# Patient Record
Sex: FEMALE | Race: WHITE | NOT HISPANIC OR LATINO | Employment: OTHER | URBAN - METROPOLITAN AREA
[De-identification: names, ages, dates, MRNs, and addresses within clinical notes are randomized per-mention and may not be internally consistent; named-entity substitution may affect disease eponyms.]

---

## 2017-04-24 ENCOUNTER — TELEPHONE (OUTPATIENT)
Dept: SLEEP MEDICINE | Facility: CLINIC | Age: 67
End: 2017-04-24

## 2017-04-24 NOTE — TELEPHONE ENCOUNTER
----- Message from Linhmansa Thompson sent at 4/24/2017  8:37 AM CDT -----  Contact: [employer]  x_  1st Request  _  2nd Request  _  3rd Request        Who: Vannessa Almeida    Why: Checking the status of the C-pap machine. Please call her     What Number to Call Back: [phone] [phone]    When to Expect a call back: (Before the end of the day)   -- if the call is after 12:00, the call back will be tomorrow.

## 2017-05-03 ENCOUNTER — OFFICE VISIT (OUTPATIENT)
Dept: FAMILY MEDICINE | Facility: CLINIC | Age: 67
End: 2017-05-03
Payer: MEDICARE

## 2017-05-03 VITALS
HEART RATE: 60 BPM | TEMPERATURE: 99 F | WEIGHT: 192.88 LBS | BODY MASS INDEX: 31 KG/M2 | OXYGEN SATURATION: 96 % | HEIGHT: 66 IN | DIASTOLIC BLOOD PRESSURE: 62 MMHG | SYSTOLIC BLOOD PRESSURE: 92 MMHG

## 2017-05-03 DIAGNOSIS — Z23 PNEUMOCOCCAL VACCINATION ADMINISTERED AT CURRENT VISIT: ICD-10-CM

## 2017-05-03 DIAGNOSIS — G47.33 OSA (OBSTRUCTIVE SLEEP APNEA): Primary | ICD-10-CM

## 2017-05-03 DIAGNOSIS — Z23 NEED FOR TDAP VACCINATION: ICD-10-CM

## 2017-05-03 PROCEDURE — 99213 OFFICE O/P EST LOW 20 MIN: CPT | Mod: PBBFAC,25,PO | Performed by: FAMILY MEDICINE

## 2017-05-03 PROCEDURE — 99214 OFFICE O/P EST MOD 30 MIN: CPT | Mod: S$PBB,,, | Performed by: FAMILY MEDICINE

## 2017-05-03 PROCEDURE — 90670 PCV13 VACCINE IM: CPT | Mod: PBBFAC,PO | Performed by: FAMILY MEDICINE

## 2017-05-03 PROCEDURE — 90715 TDAP VACCINE 7 YRS/> IM: CPT | Mod: PBBFAC,PO | Performed by: FAMILY MEDICINE

## 2017-05-03 PROCEDURE — 99999 PR PBB SHADOW E&M-EST. PATIENT-LVL III: CPT | Mod: PBBFAC,,, | Performed by: FAMILY MEDICINE

## 2017-05-03 PROCEDURE — 90471 IMMUNIZATION ADMIN: CPT | Mod: PBBFAC,PO | Performed by: FAMILY MEDICINE

## 2017-05-03 NOTE — MR AVS SNAPSHOT
[facility]  [address]  [place] [address]  Phone: [phone]  Fax: [phone]                  Lenore Subramanian   5/3/2017 2:20 PM   Office Visit    Description:  Female : 1950   Provider:  Newton Allen MD   Department:  [facility]           Reason for Visit     Establish Care           Diagnoses this Visit        Comments    HERVE (obstructive sleep apnea)    -  Primary     Pneumococcal vaccination administered at current visit         Need for Tdap vaccination                To Do List           Goals (5 Years of Data)     None      [facility]     [facility] -  Assistance  Unless otherwise directed by your provider, please contact [facility], our nurse care line that is available for  assistance.     Registered nurses in the [facility] Center provide: appointment scheduling, clinical advisement, health education, and other advisory services.  Call: [phone] (toll free)               Medications           Message regarding Medications     Verify the changes and/or additions to your medication regime listed below are the same as discussed with your clinician today.  If any of these changes or additions are incorrect, please notify your healthcare provider.             Verify that the below list of medications is an accurate representation of the medications you are currently taking.  If none reported, the list may be blank. If incorrect, please contact your healthcare provider. Carry this list with you in case of emergency.           Current Medications     aspirin (ECOTRIN) 81 MG EC tablet Take 81 mg by mouth once daily.    cetirizine (ZYRTEC) 10 MG tablet Take 1 tablet (10 mg total) by mouth once daily.    escitalopram oxalate (LEXAPRO) 20 MG tablet Take 1 tablet (20 mg total) by mouth once daily.    fluticasone (FLONASE) 50 mcg/actuation nasal spray USE 1 TO 2 SPRAYS IN EACH NOSTRIL ONCE DAILY     "sodium,potassium,mag sulfates (SUPREP BOWEL PREP KIT) 17.5-3.13-1.6 gram SolR Take 1 kit by mouth as directed.    valacyclovir (VALTREX) 1000 MG tablet 1 qd    estradiol (ESTRACE) 0.01 % (0.1 mg/gram) vaginal cream Place 0.5 g vaginally twice a week. Insert 0.5grams intravaginally twice weekly           Clinical Reference Information           Your Vitals Were     BP Pulse Temp Height Weight Last Period    92/62 (BP Location: Right arm, Patient Position: Sitting, BP Method: Manual) 60 98.5 °F (36.9 °C) (Oral) 5' 6" (1.676 m) 87.5 kg (192 lb 14.4 oz) 04/09/2010    SpO2 BMI             96% 31.14 kg/m2         Blood Pressure          Most Recent Value    BP  92/62      Allergies as of 5/3/2017     No Known Allergies      Immunizations Administered on Date of Encounter - 5/3/2017     Name Date Dose VIS Date Route    Pneumococcal Conjugate - 13 Valent 5/3/2017 0.5 mL 11/5/2015 Intramuscular    TDAP 5/3/2017 0.5 mL 2/24/2015 Intramuscular      Orders Placed During Today's Visit      Normal Orders This Visit    Pneumococcal Conjugate Vaccine (13 Valent) (IM)     Tdap Vaccine       Language Assistance Services     ATTENTION: Language assistance services are available, free of charge. Please call [phone].      ATENCIÓN: Si habla kate, tiene a shen disposición servicios gratuitos de asistencia lingüística. Llame al [phone].     CHÚ Ý: N?u b?n nói Ti?ng Vi?t, có các d?ch v? h? tr? ngôn ng? mi?n phí dành cho b?n. G?i s? [phone].         [facility] complies with applicable Federal civil rights laws and does not discriminate on the basis of race, color, national origin, age, disability, or sex.        "

## 2017-05-03 NOTE — PROGRESS NOTES
[facility]  Progress Note    SUBJECTIVE:     Chief Complaint   Patient presents with    Establish Care       HPI   Lenoer Subramanian  is a 67 y.o. female here to establish care and for follow-up of her HERVE. Her insurance is requiring her to have an established visit so she can continue to receive CPAP supplies etc. She uses it every night and has no problems with it. Currently on CPAP 8.     Review of patient's allergies indicates:  No Known Allergies    Past Medical History:   Diagnosis Date    Anxiety     Asthma     as a child    Mental disorder     Depression     Past Surgical History:   Procedure Laterality Date    COLONOSCOPY N/A 10/25/2016    Procedure: COLONOSCOPY;  Surgeon: JANINA Dominguez MD;  Location: [facility] ([address] [place] [address]);  Service: Endoscopy;  Laterality: N/A;    TONSILLECTOMY  1958    TUBAL LIGATION  1979    PP BTL     Family History   Problem Relation Age of Onset    Hypertension Mother     Diabetes Paternal Grandmother     Multiple myeloma Father     Colon cancer Sister 61    Breast cancer Maternal Aunt 72    Breast cancer Paternal Aunt 58    Hypertrophic cardiomyopathy Son     Heart disease Neg Hx     Stroke Neg Hx     Ovarian cancer Neg Hx     Melanoma Neg Hx      Social History   Substance Use Topics    Smoking status: Never Smoker    Smokeless tobacco: Never Used    Alcohol use 1.2 oz/week     2 Glasses of wine per week      Comment: occasionally        Review of Systems   Constitutional: Negative for chills, fever and malaise/fatigue.   HENT: Negative.    Respiratory: Negative.  Negative for cough and shortness of breath.    Cardiovascular: Negative.  Negative for chest pain.   Gastrointestinal: Negative.  Negative for abdominal pain, nausea and vomiting.   Genitourinary: Negative.    Neurological: Negative for weakness and headaches.   All other systems reviewed and are negative.    OBJECTIVE:     Vitals:    05/03/17 1403   BP: 92/62   Pulse: 60   Temp: 98.5 °F (36.9  °C)     Body mass index is 31.14 kg/(m^2).    Physical Exam   Constitutional: She is oriented to person, place, and time and well-developed, well-nourished, and in no distress. No distress.   HENT:   Head: Normocephalic and atraumatic.   Eyes: Conjunctivae and EOM are normal.   Cardiovascular: Normal rate, regular rhythm and normal heart sounds.  Exam reveals no gallop and no friction rub.    No murmur heard.  Pulmonary/Chest: Effort normal and breath sounds normal. No respiratory distress. She has no wheezes. She has no rales.   Abdominal: Soft. Bowel sounds are normal. She exhibits no distension. There is no tenderness.   Neurological: She is alert and oriented to person, place, and time.   Skin: Skin is warm. She is not diaphoretic.       Old records were reviewed. Labs and/or images were independently reviewed.    ASSESSMENT     1. HERVE (obstructive sleep apnea)    2. Pneumococcal vaccination administered at current visit    3. Need for Tdap vaccination        PLAN:     HERVE (obstructive sleep apnea)   - Currently on CPAP setting of 8 mmH2o. Filled out paper work. Gave copy to patient. Awaiting for patient to bring her sleep study results so we can fax with forms.     Pneumococcal vaccination administered at current visit  -     Pneumococcal Conjugate Vaccine (13 Valent) (IM)    Need for Tdap vaccination  -     Tdap Vaccine      RTC PRN  More than 50% of the encounter was spent counseling patient about HERVE, in an outpatient setting. Total time spent counseling patient: 25.    Newton Allen MD  05/03/2017 2:28 PM

## 2017-05-30 ENCOUNTER — TELEPHONE (OUTPATIENT)
Dept: FAMILY MEDICINE | Facility: CLINIC | Age: 67
End: 2017-05-30

## 2017-05-30 DIAGNOSIS — G47.33 OSA (OBSTRUCTIVE SLEEP APNEA): Primary | ICD-10-CM

## 2017-05-30 NOTE — TELEPHONE ENCOUNTER
----- Message from Charlene Fletcher sent at 5/30/2017 11:10 AM CDT -----  Contact: self  Pt needs a referral for a sleep study. Pt states medicare needs more info in sleep studay. Please call [phone]. Thanks

## 2017-06-28 ENCOUNTER — TELEPHONE (OUTPATIENT)
Dept: FAMILY MEDICINE | Facility: CLINIC | Age: 67
End: 2017-06-28

## 2017-06-28 DIAGNOSIS — Z12.31 ENCOUNTER FOR SCREENING MAMMOGRAM FOR MALIGNANT NEOPLASM OF BREAST: Primary | ICD-10-CM

## 2017-06-28 NOTE — TELEPHONE ENCOUNTER
----- Message from Cait Harrell sent at 6/28/2017  1:39 PM CDT -----  Contact: Self/[phone]  Patient is requesting a Mammogram Order. Thank you.

## 2017-07-07 RX ORDER — ESCITALOPRAM OXALATE 20 MG/1
20 TABLET ORAL DAILY
Qty: 90 TABLET | Refills: 3 | Status: SHIPPED | OUTPATIENT
Start: 2017-07-07 | End: 2018-06-13 | Stop reason: SDUPTHER

## 2017-08-18 ENCOUNTER — OFFICE VISIT (OUTPATIENT)
Dept: SLEEP MEDICINE | Facility: CLINIC | Age: 67
End: 2017-08-18
Payer: MEDICARE

## 2017-08-18 ENCOUNTER — OFFICE VISIT (OUTPATIENT)
Dept: FAMILY MEDICINE | Facility: CLINIC | Age: 67
End: 2017-08-18
Payer: MEDICARE

## 2017-08-18 ENCOUNTER — HOSPITAL ENCOUNTER (OUTPATIENT)
Dept: RADIOLOGY | Facility: HOSPITAL | Age: 67
Discharge: HOME | End: 2017-08-18
Attending: FAMILY MEDICINE
Payer: MEDICARE

## 2017-08-18 VITALS
SYSTOLIC BLOOD PRESSURE: 106 MMHG | WEIGHT: 191 LBS | BODY MASS INDEX: 30.86 KG/M2 | BODY MASS INDEX: 30.7 KG/M2 | OXYGEN SATURATION: 96 % | HEIGHT: 66 IN | WEIGHT: 192 LBS | HEIGHT: 66 IN | TEMPERATURE: 98 F | DIASTOLIC BLOOD PRESSURE: 70 MMHG | HEART RATE: 59 BPM

## 2017-08-18 VITALS
OXYGEN SATURATION: 97 % | WEIGHT: 188.94 LBS | HEART RATE: 57 BPM | DIASTOLIC BLOOD PRESSURE: 67 MMHG | BODY MASS INDEX: 30.36 KG/M2 | HEIGHT: 66 IN | SYSTOLIC BLOOD PRESSURE: 112 MMHG

## 2017-08-18 DIAGNOSIS — R79.9 ABNORMAL FINDING OF BLOOD CHEMISTRY: ICD-10-CM

## 2017-08-18 DIAGNOSIS — E66.9 OBESITY, UNSPECIFIED OBESITY SEVERITY, UNSPECIFIED OBESITY TYPE: ICD-10-CM

## 2017-08-18 DIAGNOSIS — Z78.0 POST-MENOPAUSAL: ICD-10-CM

## 2017-08-18 DIAGNOSIS — Z00.00 ROUTINE PHYSICAL EXAMINATION: Primary | ICD-10-CM

## 2017-08-18 DIAGNOSIS — F41.9 ANXIETY: ICD-10-CM

## 2017-08-18 DIAGNOSIS — G47.33 OSA (OBSTRUCTIVE SLEEP APNEA): Primary | ICD-10-CM

## 2017-08-18 DIAGNOSIS — Z12.31 ENCOUNTER FOR SCREENING MAMMOGRAM FOR MALIGNANT NEOPLASM OF BREAST: ICD-10-CM

## 2017-08-18 PROCEDURE — 99999 PR PBB SHADOW E&M-EST. PATIENT-LVL III: CPT | Mod: PBBFAC,,, | Performed by: FAMILY MEDICINE

## 2017-08-18 PROCEDURE — 77067 SCR MAMMO BI INCL CAD: CPT | Mod: TC

## 2017-08-18 PROCEDURE — 99213 OFFICE O/P EST LOW 20 MIN: CPT | Mod: PBBFAC,27,PO | Performed by: INTERNAL MEDICINE

## 2017-08-18 PROCEDURE — 99214 OFFICE O/P EST MOD 30 MIN: CPT | Mod: S$PBB,,, | Performed by: FAMILY MEDICINE

## 2017-08-18 PROCEDURE — 99214 OFFICE O/P EST MOD 30 MIN: CPT | Mod: S$PBB,,, | Performed by: INTERNAL MEDICINE

## 2017-08-18 PROCEDURE — 77067 SCR MAMMO BI INCL CAD: CPT | Mod: 26,,, | Performed by: RADIOLOGY

## 2017-08-18 PROCEDURE — 99999 PR PBB SHADOW E&M-EST. PATIENT-LVL III: CPT | Mod: PBBFAC,,, | Performed by: INTERNAL MEDICINE

## 2017-08-18 PROCEDURE — 77063 BREAST TOMOSYNTHESIS BI: CPT | Mod: 26,,, | Performed by: RADIOLOGY

## 2017-08-18 RX ORDER — CETIRIZINE HYDROCHLORIDE 10 MG/1
10 TABLET ORAL DAILY
Qty: 90 TABLET | Refills: 3 | Status: SHIPPED | OUTPATIENT
Start: 2017-08-18 | End: 2018-10-04 | Stop reason: SDUPTHER

## 2017-08-18 NOTE — LETTER
August 18, 2017      Newton Allen MD  [address] [facility]  [place] [address]           [facility]  [address] [facility] [place] [address]  Phone: [phone]  Fax: [phone]          Patient: Lenore Subramanian   MR Number: [id number]   YOB: 1950   Date of Visit: 8/18/2017       Dear Dr. Newton Allen:    Thank you for referring Lenore Subramanian to me for evaluation. Attached you will find relevant portions of my assessment and plan of care.    If you have questions, please do not hesitate to call me. I look forward to following Lenore Subramanian along with you.    Sincerely,    David Kirby MD    Enclosure  CC:  No Recipients    If you would like to receive this communication electronically, please contact [email or web address] or [phone] to request more information on [employer] Link access.    For providers and/or their staff who would like to refer a patient to [facility], please contact us through our one-stop-shop provider referral line, [facility], at [phone].    If you feel you have received this communication in error or would no longer like to receive these types of communications, please e-mail [email or web address]

## 2017-08-18 NOTE — PROGRESS NOTES
Lenore Subramanian  was seen as a new patient at the request of  Newton Allen MD for the evaluation of  herve.    CHIEF COMPLAINT:    Chief Complaint   Patient presents with    Sleep Apnea       HISTORY OF PRESENT ILLNESS: Lenore Subramanian is a 67 y.o. female is here for sleep evaluation.   Patient was diagnosed with herve in 2008 at [facility].  Patient was seen by DAVIE Bolden and last appointment was 4/24/17.  Patient was provided new cpap unit 4/17.  Patient is doing well with cpap of 8 cm H20 with nasal pillow. No issue with cpap.  With cpap, patient denied apnea, snoring.  Feeling rested upon awake.      [place] Sleepiness Scale score during initial sleep evaluation was 11.    SLEEP ROUTINE:  Activity the hour prior to sleep:   Hygiene and read    Bed partner:    Time to bed:  9-10 pm   Lights off:  yes  Sleep onset latency:  5-10 minutes        Disruptions or awakenings:    0-1 times    Wakeup time:      7 am   Perceived sleep quality:  rested       Daytime naps:      45 minutes in afternoon  Weekend sleep routine:      same  Caffeine use: 2 cups of coffee in am   exercise habit:   none      PAST MEDICAL HISTORY:    Active Ambulatory Problems     Diagnosis Date Noted    Depression with anxiety     Chronic rhinitis 07/31/2015    Oral herpes simplex, not currently active 07/31/2015    Microhematuria 07/31/2015    Primary osteoarthritis involving multiple joints 07/31/2015    Obese 07/31/2015    Family history of diabetes mellitus 07/31/2015    HERVE (obstructive sleep apnea) 05/03/2017     Resolved Ambulatory Problems     Diagnosis Date Noted    Screening 10/25/2016     Past Medical History:   Diagnosis Date    Anxiety     Asthma     Mental disorder     HERVE (obstructive sleep apnea)                 PAST SURGICAL HISTORY:    Past Surgical History:   Procedure Laterality Date    COLONOSCOPY N/A 10/25/2016    Procedure: COLONOSCOPY;  Surgeon: JANINA Dominguez MD;  Location: [facility] ([address] [place] [address]);  Service:  Endoscopy;  Laterality: N/A;    TONSILLECTOMY  1958    TUBAL LIGATION  1979    PP BTL         FAMILY HISTORY:                Family History   Problem Relation Age of Onset    Hypertension Mother     Diabetes Paternal Grandmother     Multiple myeloma Father     Colon cancer Sister 61    Breast cancer Maternal Aunt 72    Breast cancer Paternal Aunt 58    Hypertrophic cardiomyopathy Son     Heart disease Neg Hx     Stroke Neg Hx     Ovarian cancer Neg Hx     Melanoma Neg Hx        SOCIAL HISTORY:          Tobacco:   History   Smoking Status    Never Smoker   Smokeless Tobacco    Never Used       alcohol use:    History   Alcohol Use    4.2 oz/week    7 Glasses of wine per week     Comment: occasionally                 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    ALLERGIES:    Review of patient's allergies indicates:   Allergen Reactions    Poison ivy [vit e-nonoxynol 9-aloe vera] Rash       CURRENT MEDICATIONS:    Current Outpatient Prescriptions   Medication Sig Dispense Refill    aspirin (ECOTRIN) 81 MG EC tablet Take 81 mg by mouth once daily.      cetirizine (ZYRTEC) 10 MG tablet Take 1 tablet (10 mg total) by mouth once daily. 90 tablet 3    escitalopram oxalate (LEXAPRO) 20 MG tablet Take 1 tablet (20 mg total) by mouth once daily. 90 tablet 3    fluticasone (FLONASE) 50 mcg/actuation nasal spray USE 1 TO 2 SPRAYS IN EACH NOSTRIL ONCE DAILY 48 g 5    sodium,potassium,mag sulfates (SUPREP BOWEL PREP KIT) 17.5-3.13-1.6 gram SolR Take 1 kit by mouth as directed. 354 mL 0    valacyclovir (VALTREX) 1000 MG tablet 1 qd 30 tablet 1    estradiol (ESTRACE) 0.01 % (0.1 mg/gram) vaginal cream Place 0.5 g vaginally twice a week. Insert 0.5grams intravaginally twice weekly 42 g 0     No current facility-administered medications for this visit.                   REVIEW OF SYSTEMS:     Sleep related symptoms as per HPI.  CONST:Denies weight gain    HEENT: Denies sinus  "congestion  PULM: Denies dyspnea  CARD:  Denies palpitations   GI:  Denies acid reflux  : Denies polyuria  NEURO: Denies headaches  PSYCH: Denies mood disturbance  HEME: Denies anemia   Otherwise, a balance of systems reviewed is negative.          PHYSICAL EXAM:  Vitals:    08/18/17 1134   BP: 112/67   Pulse: (!) 57   SpO2: 97%   Weight: 85.7 kg (188 lb 15 oz)   Height: 5' 6" (1.676 m)   PainSc: 0-No pain     Body mass index is 30.49 kg/m².     GENERAL: Normal development, well groomed  HEENT:  Conjunctivae are non-erythematous; Pupils equal, round, and reactive to light; Nose is symmetrical; Nasal mucosa is pink and moist; Septum is midline; Inferior turbinates are normal; Nasal airflow is normal; Posterior pharynx is pink; Modified Mallampati: 3; Posterior palate is normal; Tonsils +1; Uvula is normal and pink;Tongue is normal; Dentition is fair; No TMJ tenderness; Jaw opening and protrusion without click and without discomfort.  NECK: Supple. Neck circumference is 15 inches. No [id number]. No palpable nodes.     SKIN: On face and neck: No abrasions, no rashes, no lesions.  No subcutaneous nodules are palpable.  RESPIRATORY: Chest is clear to auscultation.  Normal chest expansion and non-labored breathing at rest.  CARDIOVASCULAR: Normal S1, S2.  No murmurs, gallops or rubs. No carotid bruits bilaterally.  EXTREMITIES: No edema. No clubbing. No cyanosis. Station normal. Gait normal.        NEURO/PSYCH: Oriented to time, place and person. Normal attention span and concentration. Affect is full. Mood is normal.                                              DATA   PSG AHI 14.8/low sat 86%  Titration study 5/27/08---effective cpap 8cm    Lab Results   Component Value Date    TSH 2.209 09/13/2013     ASSESSMENT    ICD-10-CM ICD-9-CM    1. HERVE (obstructive sleep apnea) G47.33 327.23        PLAN:    herve - interrogation of cpap confirmed good compliance 23/30 greater than 4 hours.  Average 8 hours per night.  Doing " well and benefiting from cpap.  Will fax report to [facility].      Education: During our discussion today, we talked about the etiology of obstructive sleep apnea as well as the potential ramifications of untreated sleep apnea, which could include daytime sleepiness, hypertension, heart disease and/or stroke.     Precautions: The patient was advised to abstain from driving should they feel sleepy or drowsy.       Thank you for allowing me the opportunity to participate in the care of your patient.    Patient will Return in about 1 year (around 8/18/2018). with md/np.    Please cc note to  Newton Allen MD.

## 2017-08-18 NOTE — PROGRESS NOTES
[facility]  Progress Note    SUBJECTIVE:     Chief Complaint   Patient presents with    Annual Exam       HPI   Lenore Subramanian  is a 67 y.o. female here for routine physical exam. Doing well and has no new complaints/problems at this time.    Review of patient's allergies indicates:   Allergen Reactions    Poison ivy [vit e-nonoxynol 9-aloe vera] Rash       Past Medical History:   Diagnosis Date    Anxiety     Asthma     as a child    Mental disorder     Depression     Past Surgical History:   Procedure Laterality Date    COLONOSCOPY N/A 10/25/2016    Procedure: COLONOSCOPY;  Surgeon: JANINA Dominguez MD;  Location: [address]);  Service: Endoscopy;  Laterality: N/A;    TONSILLECTOMY  1958    TUBAL LIGATION  1979    PP BTL     Family History   Problem Relation Age of Onset    Hypertension Mother     Diabetes Paternal Grandmother     Multiple myeloma Father     Colon cancer Sister 61    Breast cancer Maternal Aunt 72    Breast cancer Paternal Aunt 58    Hypertrophic cardiomyopathy Son     Heart disease Neg Hx     Stroke Neg Hx     Ovarian cancer Neg Hx     Melanoma Neg Hx      Social History   Substance Use Topics    Smoking status: Never Smoker    Smokeless tobacco: Never Used    Alcohol use 1.2 oz/week     2 Glasses of wine per week      Comment: occasionally        Review of Systems   Constitutional: Negative for chills, diaphoresis and fever.   HENT: Negative for congestion, ear pain and sore throat.    Eyes: Negative for photophobia and discharge.   Respiratory: Negative for cough, shortness of breath and wheezing.    Cardiovascular: Negative for chest pain and palpitations.   Gastrointestinal: Negative for abdominal pain, constipation, diarrhea, nausea and vomiting.   Genitourinary: Negative for dysuria and hematuria.   Musculoskeletal: Negative for back pain and myalgias.   Skin: Negative for itching and rash.   Neurological: Negative for dizziness, sensory change,  focal weakness, weakness and headaches.   All other systems reviewed and are negative.    OBJECTIVE:     Vitals:    08/18/17 0918   BP: 106/70   Pulse: (!) 59   Temp: 98.2 °F (36.8 °C)     Body mass index is 30.83 kg/m².    Physical Exam   Constitutional: She is oriented to person, place, and time and well-developed, well-nourished, and in no distress. No distress.   HENT:   Head: Normocephalic and atraumatic.   Right Ear: Tympanic membrane is not perforated, not erythematous and not bulging. No hemotympanum.   Left Ear: Tympanic membrane is not perforated, not erythematous and not bulging. No hemotympanum.   Mouth/Throat: Oropharynx is clear and moist. No oropharyngeal exudate.   Eyes: Conjunctivae and EOM are normal. Pupils are equal, round, and reactive to light.   Neck: No thyromegaly present.   Cardiovascular: Normal rate, regular rhythm and normal heart sounds.  Exam reveals no gallop and no friction rub.    No murmur heard.  Pulmonary/Chest: Effort normal and breath sounds normal. No respiratory distress. She has no wheezes. She has no rales.   Abdominal: Soft. Bowel sounds are normal. She exhibits no distension. There is no tenderness. There is no rebound and no guarding.   Musculoskeletal: Normal range of motion. She exhibits no edema or tenderness.   Lymphadenopathy:     She has no cervical adenopathy.   Neurological: She is alert and oriented to person, place, and time.   Skin: Skin is warm. No rash noted. She is not diaphoretic. No erythema.       Old records were reviewed. Labs and/or images were independently reviewed.    ASSESSMENT     1. Routine physical examination    2. Obesity, unspecified obesity severity, unspecified obesity type    3. Abnormal finding of blood chemistry     4. Anxiety     5. Post-menopausal        PLAN:     Routine physical examination  -     CBC auto differential; Future  -     Comprehensive metabolic panel; Future  -     Hemoglobin A1c; Future  -     Lipid panel; Future  -      TSH; Future  -     T4, free; Future  -     We briefly discussed diet, exercise, and routine preventive exams. All questions and comments addressed.    Obesity, unspecified obesity severity, unspecified obesity type  -     CBC auto differential; Future  -     Comprehensive metabolic panel; Future  -     Hemoglobin A1c; Future  -     Lipid panel; Future  -     TSH; Future  -     T4, free; Future  -     Counseled patient about healthy diet, exercise habits, and to increase physical activity.      Post-menopausal  -     DXA Bone Density Appendicular Skeleton; Future; Expected date: 08/18/2017    Other orders  -     cetirizine (ZYRTEC) 10 MG tablet; Take 1 tablet (10 mg total) by mouth once daily.  Dispense: 90 tablet; Refill: 3      RTC RADHA Allen MD  08/18/2017 9:52 AM

## 2017-08-21 ENCOUNTER — HOSPITAL ENCOUNTER (OUTPATIENT)
Dept: RADIOLOGY | Facility: CLINIC | Age: 67
Discharge: HOME | End: 2017-08-21
Attending: FAMILY MEDICINE
Payer: MEDICARE

## 2017-08-21 DIAGNOSIS — Z78.0 POST-MENOPAUSAL: ICD-10-CM

## 2017-08-21 PROCEDURE — 77080 DXA BONE DENSITY AXIAL: CPT | Mod: 26,,, | Performed by: INTERNAL MEDICINE

## 2017-08-21 PROCEDURE — 77080 DXA BONE DENSITY AXIAL: CPT | Mod: TC,PO

## 2017-09-20 ENCOUNTER — TELEPHONE (OUTPATIENT)
Dept: PULMONOLOGY | Facility: HOSPITAL | Age: 67
End: 2017-09-20

## 2017-09-20 DIAGNOSIS — G47.33 OSA (OBSTRUCTIVE SLEEP APNEA): Primary | ICD-10-CM

## 2017-09-20 NOTE — TELEPHONE ENCOUNTER
----- Message from Aye Valdez sent at 9/18/2017 10:01 AM CDT -----  Contact: pt  Please advise. I check the chart there are no orders in, pt was seen on 8/2017   ----- Message -----  From: Teri Ramirez  Sent: 9/18/2017   8:29 AM  To: Mirta Hernandez V Staff    X_  1st Request  _  2nd Request  _  3rd Request    Who:ROWENA MADRID [[id number]]    Why: Patient states she would like to speak with the staff in regards to getting her order for supplies sent to [email or web address]... Please contact to further discuss and advise     What Number to Call Back: [phone]    When to Expect a call back: (Before the end of the day)   -- if call after 3:00 call back will be tomorrow.

## 2017-10-19 ENCOUNTER — PATIENT MESSAGE (OUTPATIENT)
Dept: FAMILY MEDICINE | Facility: CLINIC | Age: 67
End: 2017-10-19

## 2017-11-08 ENCOUNTER — CLINICAL SUPPORT (OUTPATIENT)
Dept: FAMILY MEDICINE | Facility: CLINIC | Age: 67
End: 2017-11-08
Payer: MEDICARE

## 2017-11-08 DIAGNOSIS — Z23 NEED FOR PROPHYLACTIC VACCINATION AND INOCULATION AGAINST INFLUENZA: Primary | ICD-10-CM

## 2017-11-08 PROCEDURE — G0008 ADMIN INFLUENZA VIRUS VAC: HCPCS | Mod: PBBFAC,PO

## 2017-11-08 PROCEDURE — 99499 UNLISTED E&M SERVICE: CPT | Mod: S$PBB,,, | Performed by: FAMILY MEDICINE

## 2018-06-13 ENCOUNTER — OFFICE VISIT (OUTPATIENT)
Dept: FAMILY MEDICINE | Facility: CLINIC | Age: 68
End: 2018-06-13
Payer: MEDICARE

## 2018-06-13 VITALS
BODY MASS INDEX: 32.3 KG/M2 | HEART RATE: 61 BPM | OXYGEN SATURATION: 97 % | WEIGHT: 201 LBS | HEIGHT: 66 IN | DIASTOLIC BLOOD PRESSURE: 68 MMHG | TEMPERATURE: 99 F | SYSTOLIC BLOOD PRESSURE: 116 MMHG

## 2018-06-13 DIAGNOSIS — F41.8 DEPRESSION WITH ANXIETY: ICD-10-CM

## 2018-06-13 DIAGNOSIS — M15.9 PRIMARY OSTEOARTHRITIS INVOLVING MULTIPLE JOINTS: ICD-10-CM

## 2018-06-13 DIAGNOSIS — G47.33 OSA (OBSTRUCTIVE SLEEP APNEA): ICD-10-CM

## 2018-06-13 DIAGNOSIS — Z09 HOSPITAL DISCHARGE FOLLOW-UP: Primary | ICD-10-CM

## 2018-06-13 DIAGNOSIS — J18.9 PNEUMONIA OF RIGHT LOWER LOBE DUE TO INFECTIOUS ORGANISM: ICD-10-CM

## 2018-06-13 PROCEDURE — 99214 OFFICE O/P EST MOD 30 MIN: CPT | Mod: S$PBB,,, | Performed by: NURSE PRACTITIONER

## 2018-06-13 PROCEDURE — 99214 OFFICE O/P EST MOD 30 MIN: CPT | Mod: PBBFAC,PO,25 | Performed by: NURSE PRACTITIONER

## 2018-06-13 PROCEDURE — 94640 AIRWAY INHALATION TREATMENT: CPT | Mod: PBBFAC,PO

## 2018-06-13 PROCEDURE — 99999 PR PBB SHADOW E&M-EST. PATIENT-LVL IV: CPT | Mod: PBBFAC,,, | Performed by: NURSE PRACTITIONER

## 2018-06-13 RX ORDER — IPRATROPIUM BROMIDE AND ALBUTEROL SULFATE 2.5; .5 MG/3ML; MG/3ML
3 SOLUTION RESPIRATORY (INHALATION)
Status: COMPLETED | OUTPATIENT
Start: 2018-06-13 | End: 2018-06-13

## 2018-06-13 RX ORDER — ESCITALOPRAM OXALATE 10 MG/1
10 TABLET ORAL DAILY
Qty: 90 TABLET | Refills: 0 | Status: SHIPPED | OUTPATIENT
Start: 2018-06-13 | End: 2018-08-24 | Stop reason: SDUPTHER

## 2018-06-13 RX ORDER — ALBUTEROL SULFATE 90 UG/1
2 AEROSOL, METERED RESPIRATORY (INHALATION) EVERY 6 HOURS PRN
Qty: 8 G | Refills: 0 | Status: SHIPPED | OUTPATIENT
Start: 2018-06-13 | End: 2018-10-17 | Stop reason: SDUPTHER

## 2018-06-13 RX ADMIN — IPRATROPIUM BROMIDE AND ALBUTEROL SULFATE 3 ML: 2.5; .5 SOLUTION RESPIRATORY (INHALATION) at 05:06

## 2018-06-13 NOTE — PATIENT INSTRUCTIONS
Pneumonia (Adult)  Pneumonia is an infection deep within the lungs. It is in the small air sacs (alveoli). Pneumonia may be caused by a virus or bacteria. Pneumonia caused by bacteria is usually treated with an antibiotic. Severe cases may need to be treated in the hospital. Milder cases can be treated at home. Symptoms usually start to get better during the first 2 days of treatment.    Home care  Follow these guidelines when caring for yourself at home:  · Rest at home for the first 2 to 3 days, or until you feel stronger. Dont let yourself get overly tired when you go back to your activities.  · Stay away from cigarette smoke - yours or other peoples.  · You may use acetaminophen or ibuprofen to control fever or pain, unless another medicine was prescribed. If you have chronic liver or kidney disease, talk with your healthcare provider before using these medicines. Also talk with your provider if youve had a stomach ulcer or gastrointestinal bleeding. Dont give aspirin to anyone younger than 18 years of age who is ill with a fever. It may cause severe liver damage.  · Your appetite may be poor, so a light diet is fine.  · Drink 6 to 8 glasses of fluids every day to make sure you are getting enough fluids. Beverages can include water, sport drinks, sodas without caffeine, juices, tea, or soup. Fluids will help loosen secretions in the lung. This will make it easier for you to cough up the phlegm (sputum). If you also have heart or kidney disease, check with your healthcare provider before you drink extra fluids.  · Take antibiotic medicine prescribed until it is all gone, even if you are feeling better after a few days.  Follow-up care  Follow up with your healthcare provider in the next 2 to 3 days, or as advised. This is to be sure the medicine is helping you get better.  If you are 65 or older, you should get a pneumococcal vaccine and a yearly flu (influenza) shot. You should also get these vaccines if  you have chronic lung disease like asthma, emphysema, or COPD. Recently, a second type of pneumonia vaccine has become available for everyone over 65 years old. This is in addition to the previous vaccine. Ask your provider about this.  When to seek medical advice  Call your healthcare provider right away if any of these occur:  · You dont get better within the first 48 hours of treatment  · Shortness of breath gets worse  · Rapid breathing (more than 25 breaths per minute)  · Coughing up blood  · Chest pain gets worse with breathing  · Fever of 100.4°F (38°C) or higher that doesnt get better with fever medicine  · Weakness, dizziness, or fainting that gets worse  · Thirst or dry mouth that gets worse  · Sinus pain, headache, or a stiff neck  · Chest pain not caused by coughing  Date Last Reviewed: 1/1/2017  © 9667-5337 [employer]. [address] [place] [address]. All rights reserved. This information is not intended as a substitute for professional medical care. Always follow your healthcare professional's instructions.

## 2018-06-13 NOTE — PROGRESS NOTES
"History of Present Illness   Lenore Subramanian is a 68 y.o. woman with medical history as listed below who presents today for hospital follow-up, pneumonia. She was traveling in [place] and was hiking at increased altitude when her  noticed that she seemed to be very winded. She reports she felt "woozy and lightheaded." She was able to make it home and went to rest. Her  noticed she was breathing rapidly and was diaphoretic. She then presented to urgent care, x-ray revealed RLL pneumonia, and she was admitted to nearby hospital. She received IV antibiotics, IV fluids, and nebulizer treatments. She was discharged with okay to fly home after 24 hour admission as her oxygen level maintained >88% on room air. She is taking Augmentin twice daily for seven days. She reports that since returning home she is somewhat winded on exertion but overall feels much better. She has no coughing, wheeze, or chest congestion. She denies fever or chills. Her oxygen level has maintained >95% since being home. She has no additional complaints and is otherwise healthy on today's visit.      Past Medical History:   Diagnosis Date    Anxiety     Asthma     as a child    Mental disorder     Depression    HERVE (obstructive sleep apnea)        Past Surgical History:   Procedure Laterality Date    COLONOSCOPY N/A 10/25/2016    Procedure: COLONOSCOPY;  Surgeon: JANINA Dominguez MD;  Location: [address];  Service: Endoscopy;  Laterality: N/A;    TONSILLECTOMY  1958    TUBAL LIGATION  1979    PP BTL       Social History     Social History    Marital status:      Spouse name: N/A    Number of children: N/A    Years of education: N/A     Social History Main Topics    Smoking status: Never Smoker    Smokeless tobacco: Never Used    Alcohol use 4.2 oz/week     7 Glasses of wine per week      Comment: occasionally    Drug use: No    Sexual activity: Yes     Partners: Male     Birth control/ protection: " "Post-menopausal     Other Topics Concern    None     Social History Narrative    None       Family History   Problem Relation Age of Onset    Hypertension Mother     Diabetes Paternal Grandmother     Multiple myeloma Father     Colon cancer Sister 61    Breast cancer Maternal Aunt 72    Breast cancer Paternal Aunt 58    Hypertrophic cardiomyopathy Son     Heart disease Neg Hx     Stroke Neg Hx     Ovarian cancer Neg Hx     Melanoma Neg Hx        Review of Systems  Review of Systems   Constitutional: Negative for chills and fever.   HENT: Negative for congestion and sore throat.    Respiratory: Positive for shortness of breath. Negative for cough, sputum production and wheezing.    Cardiovascular: Negative for chest pain, palpitations, orthopnea and leg swelling.     A complete review of systems was otherwise negative.    Physical Exam  /68 (BP Location: Right arm, Patient Position: Sitting, BP Method: Medium (Manual))   Pulse 61   Temp 98.5 °F (36.9 °C) (Oral)   Ht 5' 6" (1.676 m)   Wt 91.2 kg (201 lb)   LMP 04/09/2010   SpO2 97%   BMI 32.44 kg/m²   General appearance: alert, appears stated age, cooperative and no distress  Neck: no carotid bruit, no JVD and supple, symmetrical, trachea midline  Lungs: clear to auscultation bilaterally and diminished breath sounds RLL, no wheezes/rales/rhonchi, normal effort with increased rate, no accessory muscle use.  Heart: regular rate and rhythm, S1, S2 normal, no murmur, click, rub or gallop  Extremities: extremities normal, atraumatic, no cyanosis or edema  Pulses: 2+ and symmetric  Skin: Skin color, texture, turgor normal. No rashes or lesions  Lymph nodes: Cervical, supraclavicular, and axillary nodes normal.  Neurologic: Grossly normal    Assessment/Plan  Hospital discharge follow-up  Doing well, overall improving since discharge.  Continue the antibiotics until complete, and we will repeat the chest x-ray.  ER precautions discussed.    Pneumonia " of right lower lobe due to infectious organism  Nebulizer treatment provided in clinic.  We will add on albuterol inhaler to antibiotic therapy.  We will repeat chest x-ray after completion of antibiotics.  ER precautions discussed.  RTC PRN.  -     albuterol-ipratropium 2.5 mg-0.5 mg/3 mL nebulizer solution 3 mL; Take 3 mLs by nebulization one time.  -     albuterol 90 mcg/actuation inhaler; Inhale 2 puffs into the lungs every 6 (six) hours as needed for Wheezing. Rescue  Dispense: 8 g; Refill: 0  -     X-Ray Chest PA And Lateral; Future; Expected date: 06/20/2018    HERVE (obstructive sleep apnea)  The current medical regimen is effective;  continue present plan and medications.    Depression with anxiety  Would like to decrease to 10mg, refill sent to pharmacy.  -     escitalopram oxalate (LEXAPRO) 10 MG tablet; Take 1 tablet (10 mg total) by mouth once daily.  Dispense: 90 tablet; Refill: 0    Primary osteoarthritis involving multiple joints  The current medical regimen is effective;  continue present plan and medications.    She has verbalized understanding and is in agreement with plan of care.    Follow-up in about 1 week (around 6/20/2018) for chest x-ray, pneumonia resolution.

## 2018-06-13 NOTE — PROGRESS NOTES
Breathing Tx given.  Tolerated well, instructed to wait 15 min for observation. No reaction noted at discharge.

## 2018-06-20 ENCOUNTER — HOSPITAL ENCOUNTER (OUTPATIENT)
Dept: RADIOLOGY | Facility: HOSPITAL | Age: 68
Discharge: HOME | End: 2018-06-20
Attending: NURSE PRACTITIONER
Payer: MEDICARE

## 2018-06-20 ENCOUNTER — TELEPHONE (OUTPATIENT)
Dept: FAMILY MEDICINE | Facility: CLINIC | Age: 68
End: 2018-06-20

## 2018-06-20 DIAGNOSIS — J18.9 PNEUMONIA OF RIGHT LOWER LOBE DUE TO INFECTIOUS ORGANISM: ICD-10-CM

## 2018-06-20 DIAGNOSIS — J18.9 COMMUNITY ACQUIRED PNEUMONIA OF RIGHT MIDDLE LOBE OF LUNG: Primary | ICD-10-CM

## 2018-06-20 PROCEDURE — 71046 X-RAY EXAM CHEST 2 VIEWS: CPT | Mod: TC,FY,PO

## 2018-06-20 PROCEDURE — 71046 X-RAY EXAM CHEST 2 VIEWS: CPT | Mod: 26,,, | Performed by: RADIOLOGY

## 2018-06-20 RX ORDER — LEVOFLOXACIN 500 MG/1
500 TABLET, FILM COATED ORAL DAILY
Qty: 7 TABLET | Refills: 0 | Status: SHIPPED | OUTPATIENT
Start: 2018-06-20 | End: 2018-06-27

## 2018-06-20 NOTE — TELEPHONE ENCOUNTER
Please let the patient know that her x-ray looks like she still has a RML pneumonia. I would like to start another antibiotic daily for 7 days. She is to let me know if she develops any joint pain while taking the medication. Repeat chest x-ray in one month. If she is still having symptoms after completing the antibiotics, return for a follow-up visit.      Thanks!  SHARI Carreon

## 2018-06-20 NOTE — TELEPHONE ENCOUNTER
"Patient's  was advised of results. Reports today that patient "hasn't been herself lately." She is sleeping a lot more. Also recently started taking Lexapro again.  "

## 2018-07-05 ENCOUNTER — TELEPHONE (OUTPATIENT)
Dept: FAMILY MEDICINE | Facility: CLINIC | Age: 68
End: 2018-07-05

## 2018-07-05 NOTE — TELEPHONE ENCOUNTER
Looks like pt saw Paula Garcia and had CXR showing right middle lobe pneumonia residual.  She had ordered repeat CXR.  So it looks like pt can schedule XR and the order is there.

## 2018-07-05 NOTE — TELEPHONE ENCOUNTER
----- Message from Ehsan Maldonado sent at 7/5/2018  9:33 AM CDT -----  Contact: ROWENA MADRID [[id number]]            Name of Who is Calling:  ROWENA MADRID [[id number]]    What is the request in detail: Patient would like to speak with staff in regards to knowing if she can get chest XRays before or after her appointment to check for pneumonia      Can the clinic reply by [facility]: no      What Number to Call Back if not in [facility]: [phone]

## 2018-07-06 ENCOUNTER — HOSPITAL ENCOUNTER (OUTPATIENT)
Dept: RADIOLOGY | Facility: HOSPITAL | Age: 68
Discharge: HOME | End: 2018-07-06
Attending: NURSE PRACTITIONER
Payer: MEDICARE

## 2018-07-06 DIAGNOSIS — J18.9 COMMUNITY ACQUIRED PNEUMONIA OF RIGHT MIDDLE LOBE OF LUNG: ICD-10-CM

## 2018-07-06 PROCEDURE — 71046 X-RAY EXAM CHEST 2 VIEWS: CPT | Mod: 26,,, | Performed by: RADIOLOGY

## 2018-07-06 PROCEDURE — 71046 X-RAY EXAM CHEST 2 VIEWS: CPT | Mod: TC,FY,PO

## 2018-07-09 ENCOUNTER — OFFICE VISIT (OUTPATIENT)
Dept: FAMILY MEDICINE | Facility: CLINIC | Age: 68
End: 2018-07-09
Payer: MEDICARE

## 2018-07-09 VITALS
HEART RATE: 58 BPM | WEIGHT: 177 LBS | OXYGEN SATURATION: 97 % | DIASTOLIC BLOOD PRESSURE: 62 MMHG | BODY MASS INDEX: 28.45 KG/M2 | SYSTOLIC BLOOD PRESSURE: 94 MMHG | HEIGHT: 66 IN | TEMPERATURE: 98 F

## 2018-07-09 DIAGNOSIS — R53.83 FATIGUE, UNSPECIFIED TYPE: Primary | ICD-10-CM

## 2018-07-09 DIAGNOSIS — Z87.01 HISTORY OF PNEUMONIA: ICD-10-CM

## 2018-07-09 DIAGNOSIS — R06.09 DYSPNEA ON EXERTION: ICD-10-CM

## 2018-07-09 PROCEDURE — 99999 PR PBB SHADOW E&M-EST. PATIENT-LVL IV: CPT | Mod: PBBFAC,,, | Performed by: NURSE PRACTITIONER

## 2018-07-09 PROCEDURE — 99214 OFFICE O/P EST MOD 30 MIN: CPT | Mod: PBBFAC,PO | Performed by: NURSE PRACTITIONER

## 2018-07-09 PROCEDURE — 99214 OFFICE O/P EST MOD 30 MIN: CPT | Mod: S$PBB,,, | Performed by: NURSE PRACTITIONER

## 2018-07-09 NOTE — PROGRESS NOTES
History of Present Illness   Lenore Subramanian is a 68 y.o. woman with medical history as listed below who presents today for follow-up s/p bacterial pneumonia. She was initially seen by myself on 6/13/2018 for hospital follow-up. She was admitted in [place] for pneumonia. Our repeat chest x-ray showed pneumonia was still present, and we treated her with oral antibiotics with repeat chest x-ray in one month. Her x-ray is now clear, total resolution. She reports that she is still having some fatigue and shortness of breath on exertion. She reports sleeping well at night but still feeling drained throughout the day. She also reports feeling somewhat winded with exertion. She denies wheezing or cough. She has no chest pain, palpitations, calf swelling, or pain in the calf. She has had no persistent fevers or chills. She has no additional complaints and is otherwise healthy on today's visit.      Past Medical History:   Diagnosis Date    Anxiety     Asthma     as a child    Mental disorder     Depression    HERVE (obstructive sleep apnea)        Past Surgical History:   Procedure Laterality Date    COLONOSCOPY N/A 10/25/2016    Procedure: COLONOSCOPY;  Surgeon: JANINA Dominguez MD;  Location: [address];  Service: Endoscopy;  Laterality: N/A;    TONSILLECTOMY  1958    TUBAL LIGATION  1979    PP BTL       Social History     Social History    Marital status:      Spouse name: N/A    Number of children: N/A    Years of education: N/A     Social History Main Topics    Smoking status: Never Smoker    Smokeless tobacco: Never Used    Alcohol use 4.2 oz/week     7 Glasses of wine per week      Comment: occasionally    Drug use: No    Sexual activity: Yes     Partners: Male     Birth control/ protection: Post-menopausal     Other Topics Concern    None     Social History Narrative    None       Family History   Problem Relation Age of Onset    Hypertension Mother     Diabetes Paternal  "Grandmother     Multiple myeloma Father     Colon cancer Sister 61    Breast cancer Maternal Aunt 72    Breast cancer Paternal Aunt 58    Hypertrophic cardiomyopathy Son     Heart disease Neg Hx     Stroke Neg Hx     Ovarian cancer Neg Hx     Melanoma Neg Hx        Review of Systems  Review of Systems   Constitutional: Positive for malaise/fatigue. Negative for chills and fever.   Respiratory: Positive for shortness of breath. Negative for cough, sputum production and wheezing.    Cardiovascular: Negative for chest pain, palpitations, orthopnea, claudication and leg swelling.   Neurological: Negative for dizziness and loss of consciousness.     A complete review of systems was otherwise negative.    Physical Exam  BP 94/62 (BP Location: Right arm, Patient Position: Sitting, BP Method: X-Large (Manual))   Pulse (!) 58   Temp 97.7 °F (36.5 °C) (Oral)   Ht 5' 6" (1.676 m)   Wt 80.3 kg (177 lb)   LMP 04/09/2010   SpO2 97%   BMI 28.57 kg/m²   General appearance: alert, appears stated age, cooperative and no distress  Eyes: negative findings: lids and lashes normal and conjunctivae and sclerae normal  Neck: no carotid bruit, no JVD and supple, symmetrical, trachea midline  Lungs: clear to auscultation bilaterally and no wheezes, rales, or rhonchi  Heart: regular rate and rhythm, S1, S2 normal, no murmur, click, rub or gallop  Extremities: extremities normal, atraumatic, no cyanosis or edema, Homans sign is negative, no sign of DVT and no edema, redness or tenderness in the calves or thighs  Pulses: 2+ and symmetric  Skin: Skin color, texture, turgor normal. No rashes or lesions  Lymph nodes: Cervical, supraclavicular, and axillary nodes normal.  Neurologic: Grossly normal, AAOx3 with no focal neurological deficits.    Assessment/Plan  Fatigue, unspecified type  Patient prefers to hold off on the labs and see if resuming her exercise regimen helps with her fatigue. I discussed with her that my " recommendation is to have the lab work now to rule out other cause of the shortness of breath and fatigue. I would like to screen her for anemia and thyroid dysfunction as well as d-dimer and bnp. I do think she is okay to resume her daily walking. She will call us to schedule her labs. I will follow-up with her in the next few days to see how she is doing and attempt to schedule her labs. ER precautions discussed with patient. Return in one month for annual exam with PCP or sooner as needed.  -     CBC auto differential; Future; Expected date: 07/09/2018  -     Comprehensive metabolic panel; Future; Expected date: 07/09/2018  -     TSH; Future; Expected date: 07/09/2018  -     D dimer, quantitative; Future; Expected date: 07/09/2018  -     Brain natriuretic peptide; Future; Expected date: 07/09/2018    Dyspnea on exertion  As above.  -     CBC auto differential; Future; Expected date: 07/09/2018  -     Comprehensive metabolic panel; Future; Expected date: 07/09/2018  -     TSH; Future; Expected date: 07/09/2018  -     D dimer, quantitative; Future; Expected date: 07/09/2018  -     Brain natriuretic peptide; Future; Expected date: 07/09/2018    History of pneumonia  As above.  -     CBC auto differential; Future; Expected date: 07/09/2018  -     Comprehensive metabolic panel; Future; Expected date: 07/09/2018  -     TSH; Future; Expected date: 07/09/2018  -     D dimer, quantitative; Future; Expected date: 07/09/2018  -     Brain natriuretic peptide; Future; Expected date: 07/09/2018    She has verbalized understanding and is in agreement with plan of care.    Follow-up in about 1 month (around 8/9/2018) for annual exam with PCP.

## 2018-07-09 NOTE — PATIENT INSTRUCTIONS

## 2018-08-06 ENCOUNTER — OFFICE VISIT (OUTPATIENT)
Dept: FAMILY MEDICINE | Facility: CLINIC | Age: 68
End: 2018-08-06
Payer: MEDICARE

## 2018-08-06 VITALS
BODY MASS INDEX: 30.47 KG/M2 | DIASTOLIC BLOOD PRESSURE: 76 MMHG | HEIGHT: 66 IN | OXYGEN SATURATION: 96 % | WEIGHT: 189.63 LBS | TEMPERATURE: 98 F | SYSTOLIC BLOOD PRESSURE: 102 MMHG | HEART RATE: 54 BPM

## 2018-08-06 DIAGNOSIS — N95.2 POSTMENOPAUSAL ATROPHIC VAGINITIS: ICD-10-CM

## 2018-08-06 DIAGNOSIS — T78.40XA ALLERGIC REACTION, INITIAL ENCOUNTER: Primary | ICD-10-CM

## 2018-08-06 PROCEDURE — 99999 PR PBB SHADOW E&M-EST. PATIENT-LVL III: CPT | Mod: PBBFAC,,, | Performed by: FAMILY MEDICINE

## 2018-08-06 PROCEDURE — 99213 OFFICE O/P EST LOW 20 MIN: CPT | Mod: PBBFAC,PO | Performed by: FAMILY MEDICINE

## 2018-08-06 PROCEDURE — 99214 OFFICE O/P EST MOD 30 MIN: CPT | Mod: S$PBB,,, | Performed by: FAMILY MEDICINE

## 2018-08-06 RX ORDER — HYDROXYZINE HYDROCHLORIDE 25 MG/1
25 TABLET, FILM COATED ORAL 3 TIMES DAILY PRN
Qty: 30 TABLET | Refills: 0 | Status: SHIPPED | OUTPATIENT
Start: 2018-08-06 | End: 2021-12-08

## 2018-08-06 RX ORDER — METHYLPREDNISOLONE 4 MG/1
TABLET ORAL
Qty: 1 PACKAGE | Refills: 0 | Status: SHIPPED | OUTPATIENT
Start: 2018-08-06 | End: 2018-08-27

## 2018-08-06 RX ORDER — ESTRADIOL 0.1 MG/G
0.5 CREAM VAGINAL
Qty: 42 G | Refills: 0 | Status: SHIPPED | OUTPATIENT
Start: 2018-08-06 | End: 2023-11-15

## 2018-08-06 NOTE — PROGRESS NOTES
[facility]  Progress Note    SUBJECTIVE:     Chief Complaint   Patient presents with    Rash     only on arm and legs for the pass 4 to 5 day        HPI   Lenore Subramanian  is a 68 y.o. female here for c/o rash on her arms and legs for the past 4-5 days. She does some gardening when the rash came afterwards. It is very itchy and bothersome. No fevers, chills, skin breaks. Been trying otc creams without relief. Nothing really makes it better.     Review of patient's allergies indicates:   Allergen Reactions    Poison ivy [vit e-nonoxynol 9-aloe vera] Rash       Past Medical History:   Diagnosis Date    Anxiety     Asthma     as a child    Mental disorder     Depression    HERVE (obstructive sleep apnea)      Past Surgical History:   Procedure Laterality Date    COLONOSCOPY N/A 10/25/2016    Procedure: COLONOSCOPY;  Surgeon: JANINA Dominguez MD;  Location: [address]);  Service: Endoscopy;  Laterality: N/A;    TONSILLECTOMY  1958    TUBAL LIGATION  1979    PP BTL     Family History   Problem Relation Age of Onset    Hypertension Mother     Diabetes Paternal Grandmother     Multiple myeloma Father     Colon cancer Sister 61    Breast cancer Maternal Aunt 72    Breast cancer Paternal Aunt 58    Hypertrophic cardiomyopathy Son     Heart disease Neg Hx     Stroke Neg Hx     Ovarian cancer Neg Hx     Melanoma Neg Hx      Social History   Substance Use Topics    Smoking status: Never Smoker    Smokeless tobacco: Never Used    Alcohol use 4.2 oz/week     7 Glasses of wine per week      Comment: occasionally        Review of Systems   Constitutional: Negative for chills, fever and malaise/fatigue.   HENT: Negative.    Respiratory: Negative.  Negative for cough and shortness of breath.    Cardiovascular: Negative.  Negative for chest pain.   Gastrointestinal: Negative.  Negative for abdominal pain, nausea and vomiting.   Genitourinary: Negative.    Skin: Positive for itching and rash.    Neurological: Negative for weakness and headaches.   All other systems reviewed and are negative.    OBJECTIVE:     Vitals:    08/06/18 1510   BP: 102/76   Pulse: (!) 54   Temp: 98.1 °F (36.7 °C)     Body mass index is 30.6 kg/m².    Physical Exam   Constitutional: She is oriented to person, place, and time and well-developed, well-nourished, and in no distress. No distress.   HENT:   Head: Normocephalic and atraumatic.   Nose: Nose normal.   Eyes: Conjunctivae and EOM are normal.   Cardiovascular: Normal rate, regular rhythm and normal heart sounds.  Exam reveals no gallop and no friction rub.    No murmur heard.  Pulmonary/Chest: Effort normal and breath sounds normal. No respiratory distress. She has no wheezes. She has no rales. She exhibits no tenderness.   Abdominal: Soft. Bowel sounds are normal. She exhibits no distension. There is no tenderness. There is no rebound.   Neurological: She is alert and oriented to person, place, and time.   Skin: Skin is warm. Rash (on both sunexposed areas of arms, and anterior surface of both lower legs. no vesicles or skin breaks. no papules) noted. She is not diaphoretic.       Old records were reviewed. Labs and/or images were independently reviewed.    ASSESSMENT     1. Allergic reaction, initial encounter    2. Postmenopausal atrophic vaginitis        PLAN:     Allergic reaction, initial encounter  -     methylPREDNISolone (MEDROL DOSEPACK) 4 mg tablet; use as directed  Dispense: 1 Package; Refill: 0  -     hydrOXYzine HCl (ATARAX) 25 MG tablet; Take 1 tablet (25 mg total) by mouth 3 (three) times daily as needed for Itching.  Dispense: 30 tablet; Refill: 0  -     Advised to not scratch and to take meds as discussed.    Postmenopausal atrophic vaginitis  -     estradiol (ESTRACE) 0.01 % (0.1 mg/gram) vaginal cream; Place 0.5 g vaginally twice a week. Insert 0.5grams intravaginally twice weekly  Dispense: 42 g; Refill: 0  -     Stable. Continue current regimen.      RTC  RADHA Allen MD  08/06/2018 3:28 PM

## 2018-08-24 DIAGNOSIS — F41.8 DEPRESSION WITH ANXIETY: ICD-10-CM

## 2018-08-25 RX ORDER — ESCITALOPRAM OXALATE 10 MG/1
TABLET ORAL
Qty: 90 TABLET | Refills: 0 | Status: SHIPPED | OUTPATIENT
Start: 2018-08-25 | End: 2018-11-22 | Stop reason: SDUPTHER

## 2018-09-07 ENCOUNTER — TELEPHONE (OUTPATIENT)
Dept: FAMILY MEDICINE | Facility: CLINIC | Age: 68
End: 2018-09-07

## 2018-09-07 DIAGNOSIS — Z12.31 BREAST CANCER SCREENING BY MAMMOGRAM: Primary | ICD-10-CM

## 2018-09-07 NOTE — TELEPHONE ENCOUNTER
----- Message from Gaviota Crissy sent at 9/6/2018  4:34 PM CDT -----  Contact: self / [phone]  Pt is asking for order for a mammo and lab work. Please contact pt back at [phone].

## 2018-09-12 ENCOUNTER — HOSPITAL ENCOUNTER (OUTPATIENT)
Dept: RADIOLOGY | Facility: HOSPITAL | Age: 68
Discharge: HOME | End: 2018-09-12
Attending: FAMILY MEDICINE
Payer: MEDICARE

## 2018-09-12 DIAGNOSIS — Z12.31 BREAST CANCER SCREENING BY MAMMOGRAM: ICD-10-CM

## 2018-09-12 PROCEDURE — 77067 SCR MAMMO BI INCL CAD: CPT | Mod: TC,PO

## 2018-09-12 PROCEDURE — 77063 BREAST TOMOSYNTHESIS BI: CPT | Mod: 26,,, | Performed by: RADIOLOGY

## 2018-09-12 PROCEDURE — 77067 SCR MAMMO BI INCL CAD: CPT | Mod: 26,,, | Performed by: RADIOLOGY

## 2018-09-13 ENCOUNTER — PATIENT MESSAGE (OUTPATIENT)
Dept: FAMILY MEDICINE | Facility: CLINIC | Age: 68
End: 2018-09-13

## 2018-10-04 RX ORDER — CETIRIZINE HYDROCHLORIDE 10 MG/1
TABLET ORAL
Qty: 90 TABLET | Refills: 0 | Status: SHIPPED | OUTPATIENT
Start: 2018-10-04 | End: 2018-10-17 | Stop reason: SDUPTHER

## 2018-10-17 ENCOUNTER — OFFICE VISIT (OUTPATIENT)
Dept: FAMILY MEDICINE | Facility: CLINIC | Age: 68
End: 2018-10-17
Payer: MEDICARE

## 2018-10-17 VITALS
DIASTOLIC BLOOD PRESSURE: 72 MMHG | SYSTOLIC BLOOD PRESSURE: 120 MMHG | HEIGHT: 66 IN | HEART RATE: 51 BPM | TEMPERATURE: 98 F | WEIGHT: 196.88 LBS | BODY MASS INDEX: 31.64 KG/M2

## 2018-10-17 DIAGNOSIS — R06.02 SOB (SHORTNESS OF BREATH) ON EXERTION: ICD-10-CM

## 2018-10-17 DIAGNOSIS — Z00.00 ROUTINE PHYSICAL EXAMINATION: Primary | ICD-10-CM

## 2018-10-17 DIAGNOSIS — R79.9 ABNORMAL FINDING OF BLOOD CHEMISTRY: ICD-10-CM

## 2018-10-17 DIAGNOSIS — Z23 NEED FOR IMMUNIZATION AGAINST INFLUENZA: ICD-10-CM

## 2018-10-17 DIAGNOSIS — Z23 PNEUMOCOCCAL VACCINATION ADMINISTERED AT CURRENT VISIT: ICD-10-CM

## 2018-10-17 PROCEDURE — 99214 OFFICE O/P EST MOD 30 MIN: CPT | Mod: S$PBB,,, | Performed by: FAMILY MEDICINE

## 2018-10-17 PROCEDURE — 90662 IIV NO PRSV INCREASED AG IM: CPT | Mod: PBBFAC,PO

## 2018-10-17 PROCEDURE — 90732 PPSV23 VACC 2 YRS+ SUBQ/IM: CPT | Mod: PBBFAC,PO

## 2018-10-17 PROCEDURE — 99213 OFFICE O/P EST LOW 20 MIN: CPT | Mod: PBBFAC,PO | Performed by: FAMILY MEDICINE

## 2018-10-17 PROCEDURE — 99999 PR PBB SHADOW E&M-EST. PATIENT-LVL III: CPT | Mod: PBBFAC,,, | Performed by: FAMILY MEDICINE

## 2018-10-17 RX ORDER — ALBUTEROL SULFATE 90 UG/1
2 AEROSOL, METERED RESPIRATORY (INHALATION) EVERY 6 HOURS PRN
Qty: 18 G | Refills: 0 | Status: SHIPPED | OUTPATIENT
Start: 2018-10-17

## 2018-10-17 RX ORDER — CETIRIZINE HYDROCHLORIDE 10 MG/1
TABLET ORAL
Qty: 90 TABLET | Refills: 0 | Status: SHIPPED | OUTPATIENT
Start: 2018-10-17 | End: 2021-01-15 | Stop reason: SDUPTHER

## 2018-10-17 NOTE — PROGRESS NOTES
"[facility]  Progress Note    SUBJECTIVE:     Chief Complaint   Patient presents with    Annual Exam       HPI   Lenore Subramanian  is a 68 y.o. female here for routine physical exam. She has been having increasing SOB especially on exertion. She went on a hiking trip during there summer, which she subsequently developed pneumonia. Since that episode, she hasn't been "right" since. Walking short distances puts her SOB now. Resting makes it better. No chest pain.     Review of patient's allergies indicates:   Allergen Reactions    Poison ivy [vit e-nonoxynol 9-aloe vera] Rash       Past Medical History:   Diagnosis Date    Anxiety     Asthma     as a child    Mental disorder     Depression    HERVE (obstructive sleep apnea)      Past Surgical History:   Procedure Laterality Date    COLONOSCOPY N/A 10/25/2016    Procedure: COLONOSCOPY;  Surgeon: JANINA Dominguez MD;  Location: [facility] ([address] [place] [address]);  Service: Endoscopy;  Laterality: N/A;    COLONOSCOPY N/A 10/25/2016    Performed by JANINA Dominguez MD at [facility] ([address] [place] [address])    TONSILLECTOMY  1958    TUBAL LIGATION  1979    PP BTL     Family History   Problem Relation Age of Onset    Hypertension Mother     Diabetes Paternal Grandmother     Multiple myeloma Father     Colon cancer Sister 61    Breast cancer Maternal Aunt 72    Breast cancer Paternal Aunt 58    Hypertrophic cardiomyopathy Son     Heart disease Neg Hx     Stroke Neg Hx     Ovarian cancer Neg Hx     Melanoma Neg Hx      Social History     Tobacco Use    Smoking status: Never Smoker    Smokeless tobacco: Never Used   Substance Use Topics    Alcohol use: Yes     Alcohol/week: 4.2 oz     Types: 7 Glasses of wine per week     Comment: occasionally    Drug use: No        Review of Systems   Constitutional: Negative for chills, diaphoresis and fever.   HENT: Negative for congestion, ear pain and sore throat.    Eyes: Negative for photophobia and discharge.   Respiratory: Positive " for shortness of breath (on exertion). Negative for cough and wheezing.    Cardiovascular: Negative for chest pain and palpitations.   Gastrointestinal: Negative for abdominal pain, constipation, diarrhea, nausea and vomiting.   Genitourinary: Negative for dysuria and hematuria.   Musculoskeletal: Negative for back pain and myalgias.   Skin: Negative for itching and rash.   Neurological: Negative for dizziness, sensory change, focal weakness, weakness and headaches.   All other systems reviewed and are negative.    OBJECTIVE:     Vitals:    10/17/18 1252   BP: 120/72   Pulse: (!) 51   Temp: 98.1 °F (36.7 °C)     Body mass index is 31.78 kg/m².    Physical Exam   Constitutional: She is oriented to person, place, and time and well-developed, well-nourished, and in no distress. No distress.   HENT:   Head: Normocephalic and atraumatic.   Right Ear: Tympanic membrane is not perforated, not erythematous and not bulging. No hemotympanum.   Left Ear: Tympanic membrane is not perforated, not erythematous and not bulging. No hemotympanum.   Mouth/Throat: Oropharynx is clear and moist. No oropharyngeal exudate.   Eyes: Conjunctivae and EOM are normal. Pupils are equal, round, and reactive to light.   Neck: No thyromegaly present.   Cardiovascular: Normal rate, regular rhythm and normal heart sounds. Exam reveals no gallop and no friction rub.   No murmur heard.  Pulmonary/Chest: Effort normal and breath sounds normal. No respiratory distress. She has no wheezes. She has no rales.   Abdominal: Soft. Bowel sounds are normal. She exhibits no distension. There is no tenderness. There is no rebound and no guarding.   Musculoskeletal: Normal range of motion. She exhibits no edema or tenderness.   Lymphadenopathy:     She has no cervical adenopathy.   Neurological: She is alert and oriented to person, place, and time.   Skin: Skin is warm. No rash noted. She is not diaphoretic. No erythema.       Old records were reviewed. Labs  and/or images were independently reviewed.    ASSESSMENT     1. Routine physical examination    2. SOB (shortness of breath) on exertion    3. Abnormal finding of blood chemistry     4. Need for immunization against influenza    5. Pneumococcal vaccination administered at current visit        PLAN:     Routine physical examination  -     We briefly discussed diet, exercise, and routine preventive exams. All questions and comments addressed.    SOB (shortness of breath) on exertion  -     X-Ray Chest PA And Lateral; Future; Expected date: 10/17/2018  -     Consider PFT as next step.   -     albuterol (PROVENTIL/VENTOLIN HFA) 90 mcg/actuation inhaler; Inhale 2 puffs into the lungs every 6 (six) hours as needed for Wheezing. Rescue  Dispense: 18 g; Refill: 0    Need for immunization against influenza  -     Influenza - High Dose (65+) (PF) (IM)    Pneumococcal vaccination administered at current visit  -     Pneumococcal Polysaccharide Vaccine (23 Valent) (SQ/IM)    Other orders  -     cetirizine (ZYRTEC) 10 MG tablet; TAKE 1 TABLET(10 MG) BY MOUTH EVERY DAY  Dispense: 90 tablet; Refill: 0      RTC PRN    Newton Allen MD  10/17/2018 2:23 PM

## 2018-10-18 ENCOUNTER — HOSPITAL ENCOUNTER (OUTPATIENT)
Dept: RADIOLOGY | Facility: HOSPITAL | Age: 68
Discharge: HOME | End: 2018-10-18
Attending: FAMILY MEDICINE
Payer: MEDICARE

## 2018-10-18 DIAGNOSIS — R06.02 SOB (SHORTNESS OF BREATH) ON EXERTION: ICD-10-CM

## 2018-10-18 DIAGNOSIS — R06.00 DYSPNEA, UNSPECIFIED TYPE: Primary | ICD-10-CM

## 2018-10-18 PROCEDURE — 71046 X-RAY EXAM CHEST 2 VIEWS: CPT | Mod: 26,,, | Performed by: RADIOLOGY

## 2018-10-18 PROCEDURE — 71046 X-RAY EXAM CHEST 2 VIEWS: CPT | Mod: TC,FY,PO

## 2018-10-19 NOTE — PROGRESS NOTES
CXR normal. Dr. Allen's plans were PFTs if normal. [facility]. Will submit order for PFTs

## 2018-10-30 ENCOUNTER — TELEPHONE (OUTPATIENT)
Dept: FAMILY MEDICINE | Facility: CLINIC | Age: 68
End: 2018-10-30

## 2018-10-30 NOTE — TELEPHONE ENCOUNTER
Patient call to get result of xray that was done on 10/18/18. She said that she was called to schedule a breathing test and wanted to know if something was wrong with the xray.

## 2018-10-30 NOTE — TELEPHONE ENCOUNTER
Attempted to call patient. Unable to reach    CXR was normal. We are ordering PFT (pulmonary function test) as next step in work-up as she has issues with shortness of breath on exertion.

## 2018-10-30 NOTE — TELEPHONE ENCOUNTER
----- Message from Gaviota Woodward sent at 10/29/2018  2:13 PM CDT -----  Contact: self - [phone]  Pt calling for test results from 10/18. Please contact back at earliest convenience.

## 2018-11-07 ENCOUNTER — HOSPITAL ENCOUNTER (OUTPATIENT)
Dept: RESPIRATORY THERAPY | Facility: HOSPITAL | Age: 68
Discharge: HOME | End: 2018-11-07
Attending: INTERNAL MEDICINE
Payer: MEDICARE

## 2018-11-07 DIAGNOSIS — R06.00 DYSPNEA, UNSPECIFIED TYPE: ICD-10-CM

## 2018-11-07 PROCEDURE — 94729 DIFFUSING CAPACITY: CPT

## 2018-11-07 PROCEDURE — 94010 BREATHING CAPACITY TEST: CPT

## 2018-11-07 PROCEDURE — 94727 GAS DIL/WSHOT DETER LNG VOL: CPT

## 2018-11-08 LAB
BRPFT: ABNORMAL
DLCO ADJ PRE: 20.78 ML/(MIN*MMHG) (ref 17.22–28.69)
DLCO SINGLE BREATH LLN: 17.22
DLCO SINGLE BREATH PRE REF: 90.5 %
DLCO SINGLE BREATH REF: 22.95
DLCOC SBVA LLN: 3.01
DLCOC SBVA PRE REF: 104.8 %
DLCOC SBVA REF: 4.39
DLCOC SINGLE BREATH LLN: 17.22
DLCOC SINGLE BREATH PRE REF: 90.5 %
DLCOC SINGLE BREATH REF: 22.95
DLCOVA LLN: 3.01
DLCOVA PRE REF: 104.8 %
DLCOVA PRE: 4.6 ML/(MIN*MMHG*L) (ref 3.01–5.76)
DLCOVA REF: 4.39
DLVAADJ PRE: 4.6 ML/(MIN*MMHG*L) (ref 3.01–5.76)
ERV LLN: 0.73
ERV REF: 0.73
ERVN2 LLN: 0.73
ERVN2 PRE REF: 85.2 %
ERVN2 PRE: 0.62 L (ref 0.73–0.73)
ERVN2 REF: 0.73
FEF 25 75 LLN: 1
FEF 25 75 PRE REF: 59.5 %
FEF 25 75 REF: 2.08
FEV1 FVC LLN: 66
FEV1 FVC PRE REF: 87.9 %
FEV1 FVC REF: 78
FEV1 LLN: 1.8
FEV1 PRE REF: 88.4 %
FEV1 REF: 2.44
FEV6 LLN: 2.5
FEV6 PRE REF: 89.7 %
FEV6 PRE: 2.88 L (ref 2.5–3.93)
FEV6 REF: 3.21
FRCN2 LLN: 1.98
FRCN2 PRE REF: 68.7 %
FRCN2 REF: 2.81
FRCPLETH LLN: 1.98
FRCPLETH REF: 2.81
FVC LLN: 2.33
FVC PRE REF: 99.8 %
FVC REF: 3.14
IVC PRE: 2.68 L (ref 2.33–3.96)
IVC SINGLE BREATH LLN: 2.33
IVC SINGLE BREATH PRE REF: 85.4 %
IVC SINGLE BREATH REF: 3.14
MVV LLN: 80
MVV REF: 94
PEF LLN: 4.4
PEF PRE REF: 63.9 %
PEF REF: 6.2
PRE DLCO: 20.78 ML/(MIN*MMHG) (ref 17.22–28.69)
PRE FEF 25 75: 1.24 L/S (ref 1–3.16)
PRE FET 100: 13.14 SEC
PRE FEV1 FVC: 68.87 % (ref 65.53–91.11)
PRE FEV1: 2.16 L (ref 1.8–3.08)
PRE FRC N2: 1.93 L
PRE FVC: 3.14 L (ref 2.33–3.96)
PRE PEF: 3.96 L/S (ref 4.4–8.01)
RAW LLN: 3.06
RAW REF: 3.06
RV LLN: 1.5
RV REF: 2.08
RVN2 LLN: 1.5
RVN2 PRE REF: 48.6 %
RVN2 PRE: 1.01 L (ref 1.5–2.65)
RVN2 REF: 2.08
RVN2TLCN2 LLN: 32
RVN2TLCN2 PRE REF: 58.8 %
RVN2TLCN2 PRE: 24.35 % (ref 31.81–50.99)
RVN2TLCN2 REF: 41
RVTLC LLN: 32
RVTLC REF: 41
TLC LLN: 4.25
TLC REF: 5.23
TLCN2 LLN: 4.25
TLCN2 PRE REF: 79.3 %
TLCN2 PRE: 4.15 L (ref 4.25–6.22)
TLCN2 REF: 5.23
VA PRE: 4.52 L (ref 5.08–5.08)
VA SINGLE BREATH LLN: 5.08
VA SINGLE BREATH PRE REF: 89 %
VA SINGLE BREATH REF: 5.08
VC LLN: 2.33
VC REF: 3.14
VCMAXN2 LLN: 2.33
VCMAXN2 PRE REF: 99.8 %
VCMAXN2 PRE: 3.14 L (ref 2.33–3.96)
VCMAXN2 REF: 3.14

## 2018-11-22 DIAGNOSIS — F41.8 DEPRESSION WITH ANXIETY: ICD-10-CM

## 2018-11-23 RX ORDER — ESCITALOPRAM OXALATE 10 MG/1
TABLET ORAL
Qty: 90 TABLET | Refills: 0 | Status: SHIPPED | OUTPATIENT
Start: 2018-11-23 | End: 2019-08-19 | Stop reason: SDUPTHER

## 2019-03-18 ENCOUNTER — TELEPHONE (OUTPATIENT)
Dept: FAMILY MEDICINE | Facility: CLINIC | Age: 69
End: 2019-03-18

## 2019-03-18 NOTE — TELEPHONE ENCOUNTER
----- Message from Cami Armendariz sent at 3/18/2019  1:44 PM CDT -----  Contact: Self  Patient called to request medication refill. Please call at [phone]        fluticasone (FLONASE) 50 mcg/actuation nasal spray 48 g               [id number] - [place] - [address]

## 2019-05-29 ENCOUNTER — PES CALL (OUTPATIENT)
Dept: ADMINISTRATIVE | Facility: CLINIC | Age: 69
End: 2019-05-29

## 2019-08-14 ENCOUNTER — TELEPHONE (OUTPATIENT)
Dept: FAMILY MEDICINE | Facility: CLINIC | Age: 69
End: 2019-08-14

## 2019-08-14 NOTE — TELEPHONE ENCOUNTER
----- Message from Lindsey Puga sent at 8/14/2019 11:03 AM CDT -----  Contact: Self   Type: Patient Call Back    What is the request in detail: Pt calling to speak to a nurse regarding med below.     escitalopram oxalate (LEXAPRO) 10 MG tablet    Can the clinic reply by [facility]? No    Would the patient rather a call back or a response via [facility]? Call back     Best call back number: [phone]

## 2019-08-15 DIAGNOSIS — N95.2 POSTMENOPAUSAL ATROPHIC VAGINITIS: ICD-10-CM

## 2019-08-15 DIAGNOSIS — F41.8 DEPRESSION WITH ANXIETY: ICD-10-CM

## 2019-08-15 RX ORDER — ESCITALOPRAM OXALATE 10 MG/1
10 TABLET ORAL DAILY
Qty: 90 TABLET | Refills: 0 | OUTPATIENT
Start: 2019-08-15

## 2019-08-15 RX ORDER — ESTRADIOL 0.1 MG/G
0.5 CREAM VAGINAL
Qty: 42 G | Refills: 0 | Status: CANCELLED | OUTPATIENT
Start: 2019-08-15 | End: 2020-08-14

## 2019-08-15 NOTE — TELEPHONE ENCOUNTER
----- Message from Therese Caicedo sent at 8/15/2019  1:53 PM CDT -----  Contact: Self  Type: RX Refill Request    Who Called: Self    Refill or New Rx:Refill    RX Name and Strength:escitalopram oxalate (LEXAPRO) 10 MG tablet    Is this a 30 day or 90 day RX:90 day    Preferred Pharmacy with phone number:  [facility]    Local or Mail Order:Mail Order    Would the patient rather a call back or a response via [facility]? Call back    Best Call Back Number:[phone]

## 2019-08-19 DIAGNOSIS — J31.0 CHRONIC RHINITIS: ICD-10-CM

## 2019-08-19 DIAGNOSIS — F41.8 DEPRESSION WITH ANXIETY: ICD-10-CM

## 2019-08-19 RX ORDER — ESCITALOPRAM OXALATE 10 MG/1
10 TABLET ORAL DAILY
Qty: 90 TABLET | Refills: 0 | Status: SHIPPED | OUTPATIENT
Start: 2019-08-19 | End: 2019-11-18 | Stop reason: SDUPTHER

## 2019-08-19 RX ORDER — FLUTICASONE PROPIONATE 50 MCG
SPRAY, SUSPENSION (ML) NASAL
Qty: 48 G | Refills: 5 | Status: SHIPPED | OUTPATIENT
Start: 2019-08-19 | End: 2019-11-18 | Stop reason: SDUPTHER

## 2019-08-19 NOTE — TELEPHONE ENCOUNTER
----- Message from Ann Henry sent at 8/19/2019  4:10 PM CDT -----  Contact: Patient ph [phone]  Type: RX Refill Request    Who Called: Patient    Refill or New Rx: Refill    RX Name and Strength: escitalopram oxalate (LEXAPRO) 10 MG tablet and fluticasone (FLONASE) 50 mcg/actuation nasal spray    Is this a 30 day or 90 day RX: 90 day    Preferred Pharmacy with phone number: .  [facility] - [place], [place] - [address]  [address] [address]  Phone: [phone] Fax: [phone]    Local or Mail Order: Local    Would the patient rather a call back or a response via [facility]? Call back    Best Call Back Number: [phone]

## 2019-10-02 ENCOUNTER — TELEPHONE (OUTPATIENT)
Dept: FAMILY MEDICINE | Facility: CLINIC | Age: 69
End: 2019-10-02

## 2019-10-02 DIAGNOSIS — Z12.31 BREAST CANCER SCREENING BY MAMMOGRAM: Primary | ICD-10-CM

## 2019-10-02 NOTE — TELEPHONE ENCOUNTER
----- Message from Anusha Tran sent at 10/2/2019  9:29 AM CDT -----  Contact: Patient   Type:  Mammogram    Caller is requesting to schedule their annual mammogram appointment.  Order is not listed in EPIC.  Please enter order and contact patient to schedule.  Name of Caller: Patient     Where would they like the mammogram performed? [facility]    Would the patient rather a call back or a response via [facility]? Call back     Best Call Back Number: [phone]    Additional Information: pt is requesting a flu injections, as well as her AWV

## 2019-10-03 ENCOUNTER — HOSPITAL ENCOUNTER (OUTPATIENT)
Dept: RADIOLOGY | Facility: HOSPITAL | Age: 69
Discharge: HOME | End: 2019-10-03
Attending: FAMILY MEDICINE
Payer: MEDICARE

## 2019-10-03 DIAGNOSIS — Z12.31 BREAST CANCER SCREENING BY MAMMOGRAM: ICD-10-CM

## 2019-10-03 PROCEDURE — 77067 SCR MAMMO BI INCL CAD: CPT | Mod: 26,HCNC,, | Performed by: RADIOLOGY

## 2019-10-03 PROCEDURE — 77063 BREAST TOMOSYNTHESIS BI: CPT | Mod: 26,HCNC,, | Performed by: RADIOLOGY

## 2019-10-03 PROCEDURE — 77067 MAMMO DIGITAL SCREENING BILAT WITH TOMOSYNTHESIS_CAD: ICD-10-PCS | Mod: 26,HCNC,, | Performed by: RADIOLOGY

## 2019-10-03 PROCEDURE — 77067 SCR MAMMO BI INCL CAD: CPT | Mod: TC,HCNC,PO

## 2019-10-03 PROCEDURE — 77063 MAMMO DIGITAL SCREENING BILAT WITH TOMOSYNTHESIS_CAD: ICD-10-PCS | Mod: 26,HCNC,, | Performed by: RADIOLOGY

## 2019-10-03 PROCEDURE — 77063 BREAST TOMOSYNTHESIS BI: CPT | Mod: TC,HCNC,PO

## 2019-10-08 ENCOUNTER — CLINICAL SUPPORT (OUTPATIENT)
Dept: FAMILY MEDICINE | Facility: CLINIC | Age: 69
End: 2019-10-08
Payer: MEDICARE

## 2019-10-08 DIAGNOSIS — Z23 NEED FOR PROPHYLACTIC VACCINATION AND INOCULATION AGAINST INFLUENZA: Primary | ICD-10-CM

## 2019-10-08 PROCEDURE — 99499 NO LOS: ICD-10-PCS | Mod: HCNC,S$GLB,, | Performed by: FAMILY MEDICINE

## 2019-10-08 PROCEDURE — G0008 FLU VACCINE - HIGH DOSE (65+) PRESERVATIVE FREE IM: ICD-10-PCS | Mod: HCNC,S$GLB,, | Performed by: FAMILY MEDICINE

## 2019-10-08 PROCEDURE — G0008 ADMIN INFLUENZA VIRUS VAC: HCPCS | Mod: HCNC,S$GLB,, | Performed by: FAMILY MEDICINE

## 2019-10-08 PROCEDURE — 90662 IIV NO PRSV INCREASED AG IM: CPT | Mod: HCNC,S$GLB,, | Performed by: FAMILY MEDICINE

## 2019-10-08 PROCEDURE — 90662 FLU VACCINE - HIGH DOSE (65+) PRESERVATIVE FREE IM: ICD-10-PCS | Mod: HCNC,S$GLB,, | Performed by: FAMILY MEDICINE

## 2019-10-08 PROCEDURE — 99499 UNLISTED E&M SERVICE: CPT | Mod: HCNC,S$GLB,, | Performed by: FAMILY MEDICINE

## 2019-11-11 DIAGNOSIS — J31.0 CHRONIC RHINITIS: ICD-10-CM

## 2019-11-11 DIAGNOSIS — F41.8 DEPRESSION WITH ANXIETY: ICD-10-CM

## 2019-11-11 RX ORDER — FLUTICASONE PROPIONATE 50 MCG
SPRAY, SUSPENSION (ML) NASAL
Qty: 48 G | Refills: 0 | OUTPATIENT
Start: 2019-11-11

## 2019-11-11 RX ORDER — ESCITALOPRAM OXALATE 10 MG/1
TABLET ORAL
Qty: 90 TABLET | Refills: 0 | OUTPATIENT
Start: 2019-11-11

## 2019-11-18 ENCOUNTER — OFFICE VISIT (OUTPATIENT)
Dept: FAMILY MEDICINE | Facility: CLINIC | Age: 69
End: 2019-11-18
Payer: MEDICARE

## 2019-11-18 VITALS
WEIGHT: 198.06 LBS | BODY MASS INDEX: 31.83 KG/M2 | HEART RATE: 52 BPM | HEIGHT: 66 IN | SYSTOLIC BLOOD PRESSURE: 126 MMHG | OXYGEN SATURATION: 98 % | DIASTOLIC BLOOD PRESSURE: 68 MMHG | TEMPERATURE: 98 F

## 2019-11-18 DIAGNOSIS — R93.3 ABNORMAL FINDINGS ON DIAGNOSTIC IMAGING OF OTHER PARTS OF DIGESTIVE TRACT: ICD-10-CM

## 2019-11-18 DIAGNOSIS — J31.0 CHRONIC RHINITIS: ICD-10-CM

## 2019-11-18 DIAGNOSIS — Z00.00 ROUTINE PHYSICAL EXAMINATION: Primary | ICD-10-CM

## 2019-11-18 DIAGNOSIS — R79.9 ABNORMAL FINDING OF BLOOD CHEMISTRY, UNSPECIFIED: ICD-10-CM

## 2019-11-18 DIAGNOSIS — F41.8 DEPRESSION WITH ANXIETY: ICD-10-CM

## 2019-11-18 PROCEDURE — 99499 RISK ADDL DX/OHS AUDIT: ICD-10-PCS | Mod: S$GLB,,, | Performed by: FAMILY MEDICINE

## 2019-11-18 PROCEDURE — 99397 PR PREVENTIVE VISIT,EST,65 & OVER: ICD-10-PCS | Mod: HCNC,S$GLB,, | Performed by: FAMILY MEDICINE

## 2019-11-18 PROCEDURE — 99999 PR PBB SHADOW E&M-EST. PATIENT-LVL III: ICD-10-PCS | Mod: PBBFAC,HCNC,, | Performed by: FAMILY MEDICINE

## 2019-11-18 PROCEDURE — 99999 PR PBB SHADOW E&M-EST. PATIENT-LVL III: CPT | Mod: PBBFAC,HCNC,, | Performed by: FAMILY MEDICINE

## 2019-11-18 PROCEDURE — 99397 PER PM REEVAL EST PAT 65+ YR: CPT | Mod: HCNC,S$GLB,, | Performed by: FAMILY MEDICINE

## 2019-11-18 PROCEDURE — 99499 UNLISTED E&M SERVICE: CPT | Mod: S$GLB,,, | Performed by: FAMILY MEDICINE

## 2019-11-18 RX ORDER — FLUTICASONE PROPIONATE 50 MCG
SPRAY, SUSPENSION (ML) NASAL
Qty: 48 G | Refills: 5 | Status: SHIPPED | OUTPATIENT
Start: 2019-11-18 | End: 2021-01-15 | Stop reason: SDUPTHER

## 2019-11-18 RX ORDER — ESCITALOPRAM OXALATE 10 MG/1
10 TABLET ORAL DAILY
Qty: 90 TABLET | Refills: 3 | Status: SHIPPED | OUTPATIENT
Start: 2019-11-18 | End: 2020-08-25

## 2019-11-18 NOTE — PROGRESS NOTES
[facility]  Progress Note    SUBJECTIVE:     Chief Complaint   Patient presents with    Annual Exam       HPI   Lenore Subramanian  is a 69 y.o. female here for routine physical exam. Recently got home from a 14 day [place] cruise. Patient has no other new complaints/problems at this time.      Review of patient's allergies indicates:   Allergen Reactions    Poison ivy [vit e-nonoxynol 9-aloe vera] Rash       Past Medical History:   Diagnosis Date    Anxiety     Asthma     as a child    Mental disorder     Depression    HERVE (obstructive sleep apnea)      Past Surgical History:   Procedure Laterality Date    COLONOSCOPY N/A 10/25/2016    Procedure: COLONOSCOPY;  Surgeon: JANINA Dominguez MD;  Location: [address]);  Service: Endoscopy;  Laterality: N/A;    TONSILLECTOMY  1958    TUBAL LIGATION  1979    PP BTL     Family History   Problem Relation Age of Onset    Hypertension Mother     Diabetes Paternal Grandmother     Multiple myeloma Father     Colon cancer Sister 61    Breast cancer Maternal Aunt 72    Breast cancer Paternal Aunt 58    Hypertrophic cardiomyopathy Son     Heart disease Neg Hx     Stroke Neg Hx     Ovarian cancer Neg Hx     Melanoma Neg Hx      Social History     Tobacco Use    Smoking status: Never Smoker    Smokeless tobacco: Never Used   Substance Use Topics    Alcohol use: Yes     Alcohol/week: 7.0 standard drinks     Types: 7 Glasses of wine per week     Comment: occasionally    Drug use: No        Review of Systems   Constitutional: Negative for chills, diaphoresis and fever.   HENT: Negative for congestion, ear pain and sore throat.    Eyes: Negative for photophobia and discharge.   Respiratory: Negative for cough, shortness of breath and wheezing.    Cardiovascular: Negative for chest pain and palpitations.   Gastrointestinal: Negative for abdominal pain, constipation, diarrhea, nausea and vomiting.   Genitourinary: Negative for dysuria and  hematuria.   Musculoskeletal: Negative for back pain and myalgias.   Skin: Negative for itching and rash.   Neurological: Negative for dizziness, sensory change, focal weakness, weakness and headaches.   All other systems reviewed and are negative.    OBJECTIVE:     Vitals:    11/18/19 0834   BP: 126/68   Pulse: (!) 52   Temp: 97.9 °F (36.6 °C)     Body mass index is 31.97 kg/m².    Physical Exam   Constitutional: She is oriented to person, place, and time and well-developed, well-nourished, and in no distress. No distress.   HENT:   Head: Normocephalic and atraumatic.   Right Ear: Tympanic membrane is not perforated, not erythematous and not bulging. No hemotympanum.   Left Ear: Tympanic membrane is not perforated, not erythematous and not bulging. No hemotympanum.   Mouth/Throat: Oropharynx is clear and moist. No oropharyngeal exudate.   Eyes: Pupils are equal, round, and reactive to light. Conjunctivae and EOM are normal.   Neck: No thyromegaly present.   Cardiovascular: Normal rate, regular rhythm and normal heart sounds. Exam reveals no gallop and no friction rub.   No murmur heard.  Pulmonary/Chest: Effort normal and breath sounds normal. No respiratory distress. She has no wheezes. She has no rales.   Abdominal: Soft. Bowel sounds are normal. She exhibits no distension. There is no tenderness. There is no rebound and no guarding.   Musculoskeletal: Normal range of motion. She exhibits no edema or tenderness.   Lymphadenopathy:     She has no cervical adenopathy.   Neurological: She is alert and oriented to person, place, and time.   Skin: Skin is warm. No rash noted. She is not diaphoretic. No erythema.       Old records were reviewed. Labs and/or images were independently reviewed.    ASSESSMENT     1. Routine physical examination    2. Depression with anxiety    3. Chronic rhinitis    4. Abnormal finding of blood chemistry, unspecified     5. Abnormal findings on diagnostic imaging of other parts of  digestive tract         PLAN:     Routine physical examination  -     CBC auto differential; Future  -     Comprehensive metabolic panel; Future  -     Hemoglobin A1c; Future  -     Lipid panel; Future  -     TSH; Future  -     T4, free; Future  -     We briefly discussed diet, exercise, and routine preventive exams. All questions and comments addressed.    Depression with anxiety  -     escitalopram oxalate (LEXAPRO) 10 MG tablet; Take 1 tablet (10 mg total) by mouth once daily.  Dispense: 90 tablet; Refill: 3  -     CBC auto differential; Future  -     Comprehensive metabolic panel; Future  -     Hemoglobin A1c; Future  -     Lipid panel; Future  -     TSH; Future  -     T4, free; Future  -     Stable. Continue current regimen.    Chronic rhinitis  -     fluticasone propionate (FLONASE) 50 mcg/actuation nasal spray; USE 1 TO 2 SPRAYS IN EACH NOSTRIL ONCE DAILY  Dispense: 48 g; Refill: 5  -     CBC auto differential; Future  -     Comprehensive metabolic panel; Future  -     Hemoglobin A1c; Future  -     Lipid panel; Future  -     TSH; Future  -     T4, free; Future  -     Stable. Continue current regimen.      RTC RADHA Allen MD  11/18/2019 8:50 AM

## 2019-11-20 ENCOUNTER — LAB VISIT (OUTPATIENT)
Dept: LAB | Facility: HOSPITAL | Age: 69
End: 2019-11-20
Attending: FAMILY MEDICINE
Payer: MEDICARE

## 2019-11-20 DIAGNOSIS — R79.9 ABNORMAL FINDING OF BLOOD CHEMISTRY, UNSPECIFIED: ICD-10-CM

## 2019-11-20 DIAGNOSIS — R93.3 ABNORMAL FINDINGS ON DIAGNOSTIC IMAGING OF OTHER PARTS OF DIGESTIVE TRACT: ICD-10-CM

## 2019-11-20 DIAGNOSIS — J31.0 CHRONIC RHINITIS: ICD-10-CM

## 2019-11-20 DIAGNOSIS — Z00.00 ROUTINE PHYSICAL EXAMINATION: ICD-10-CM

## 2019-11-20 DIAGNOSIS — F41.8 DEPRESSION WITH ANXIETY: ICD-10-CM

## 2019-11-20 LAB
ALBUMIN SERPL BCP-MCNC: 4.1 G/DL (ref 3.5–5.2)
ALP SERPL-CCNC: 88 U/L (ref 55–135)
ALT SERPL W/O P-5'-P-CCNC: 17 U/L (ref 10–44)
ANION GAP SERPL CALC-SCNC: 8 MMOL/L (ref 8–16)
AST SERPL-CCNC: 25 U/L (ref 10–40)
BASOPHILS # BLD AUTO: 0.03 K/UL (ref 0–0.2)
BASOPHILS NFR BLD: 0.5 % (ref 0–1.9)
BILIRUB SERPL-MCNC: 0.7 MG/DL (ref 0.1–1)
BUN SERPL-MCNC: 15 MG/DL (ref 8–23)
CALCIUM SERPL-MCNC: 9.6 MG/DL (ref 8.7–10.5)
CHLORIDE SERPL-SCNC: 104 MMOL/L (ref 95–110)
CHOLEST SERPL-MCNC: 201 MG/DL (ref 120–199)
CHOLEST/HDLC SERPL: 3.4 {RATIO} (ref 2–5)
CO2 SERPL-SCNC: 28 MMOL/L (ref 23–29)
CREAT SERPL-MCNC: 0.8 MG/DL (ref 0.5–1.4)
DIFFERENTIAL METHOD: ABNORMAL
EOSINOPHIL # BLD AUTO: 0.2 K/UL (ref 0–0.5)
EOSINOPHIL NFR BLD: 2.7 % (ref 0–8)
ERYTHROCYTE [DISTWIDTH] IN BLOOD BY AUTOMATED COUNT: 13.4 % (ref 11.5–14.5)
EST. GFR  (AFRICAN AMERICAN): >60 ML/MIN/1.73 M^2
EST. GFR  (NON AFRICAN AMERICAN): >60 ML/MIN/1.73 M^2
ESTIMATED AVG GLUCOSE: 105 MG/DL (ref 68–131)
GLUCOSE SERPL-MCNC: 93 MG/DL (ref 70–110)
HBA1C MFR BLD HPLC: 5.3 % (ref 4–5.6)
HCT VFR BLD AUTO: 48 % (ref 37–48.5)
HDLC SERPL-MCNC: 60 MG/DL (ref 40–75)
HDLC SERPL: 29.9 % (ref 20–50)
HGB BLD-MCNC: 15.3 G/DL (ref 12–16)
IMM GRANULOCYTES # BLD AUTO: 0.01 K/UL (ref 0–0.04)
IMM GRANULOCYTES NFR BLD AUTO: 0.2 % (ref 0–0.5)
LDLC SERPL CALC-MCNC: 121.6 MG/DL (ref 63–159)
LYMPHOCYTES # BLD AUTO: 2.3 K/UL (ref 1–4.8)
LYMPHOCYTES NFR BLD: 39.6 % (ref 18–48)
MCH RBC QN AUTO: 31 PG (ref 27–31)
MCHC RBC AUTO-ENTMCNC: 31.9 G/DL (ref 32–36)
MCV RBC AUTO: 97 FL (ref 82–98)
MONOCYTES # BLD AUTO: 0.6 K/UL (ref 0.3–1)
MONOCYTES NFR BLD: 9.8 % (ref 4–15)
NEUTROPHILS # BLD AUTO: 2.8 K/UL (ref 1.8–7.7)
NEUTROPHILS NFR BLD: 47.2 % (ref 38–73)
NONHDLC SERPL-MCNC: 141 MG/DL
NRBC BLD-RTO: 0 /100 WBC
PLATELET # BLD AUTO: 239 K/UL (ref 150–350)
PMV BLD AUTO: 11.1 FL (ref 9.2–12.9)
POTASSIUM SERPL-SCNC: 4.4 MMOL/L (ref 3.5–5.1)
PROT SERPL-MCNC: 7.2 G/DL (ref 6–8.4)
RBC # BLD AUTO: 4.94 M/UL (ref 4–5.4)
SODIUM SERPL-SCNC: 140 MMOL/L (ref 136–145)
T4 FREE SERPL-MCNC: 0.84 NG/DL (ref 0.71–1.51)
TRIGL SERPL-MCNC: 97 MG/DL (ref 30–150)
TSH SERPL DL<=0.005 MIU/L-ACNC: 2.05 UIU/ML (ref 0.4–4)
WBC # BLD AUTO: 5.84 K/UL (ref 3.9–12.7)

## 2019-11-20 PROCEDURE — 85025 COMPLETE CBC W/AUTO DIFF WBC: CPT | Mod: HCNC

## 2019-11-20 PROCEDURE — 80053 COMPREHEN METABOLIC PANEL: CPT | Mod: HCNC

## 2019-11-20 PROCEDURE — 80061 LIPID PANEL: CPT | Mod: HCNC

## 2019-11-20 PROCEDURE — 36415 COLL VENOUS BLD VENIPUNCTURE: CPT | Mod: HCNC,PO

## 2019-11-20 PROCEDURE — 83036 HEMOGLOBIN GLYCOSYLATED A1C: CPT | Mod: HCNC

## 2019-11-20 PROCEDURE — 84439 ASSAY OF FREE THYROXINE: CPT | Mod: HCNC

## 2019-11-20 PROCEDURE — 84443 ASSAY THYROID STIM HORMONE: CPT | Mod: HCNC

## 2020-08-24 DIAGNOSIS — F41.8 DEPRESSION WITH ANXIETY: ICD-10-CM

## 2020-08-24 NOTE — TELEPHONE ENCOUNTER
Care Due:                  Date            Visit Type   Department     Provider  --------------------------------------------------------------------------------                                PHYSICAL -   [facility]                              ESTABLISHED   MED/ INTERNAL  Last Visit: 11-      PATIENT      MED/ PEDS      NORM OLSON  Next Visit: None Scheduled  None         None Found                                                            Last  Test          Frequency    Reason                     Performed    Due Date  --------------------------------------------------------------------------------    Office Visit  12 months..  albuterol, escitalopram..  11- 11-    Powered by [employer]. Reference number: [id number]. 8/24/2020 1:27:03 PM CDT

## 2020-08-25 RX ORDER — ESCITALOPRAM OXALATE 10 MG/1
10 TABLET ORAL DAILY
Qty: 90 TABLET | Refills: 0 | Status: SHIPPED | OUTPATIENT
Start: 2020-08-25 | End: 2020-12-02 | Stop reason: SDUPTHER

## 2020-08-25 NOTE — PROGRESS NOTES
Refill Authorization Note    is requesting a refill authorization.    Brief assessment and rationale for refill: APPROVE: NEEDS APPT(ANNUAL)     Medication-related problems identified: Requires appointment    Medication Therapy Plan: CDMR. NEEDS APPT(ANNUAL); APPROVE PER PROTOCOL    Medication reconciliation completed: No                       Comments:          Requested Prescriptions   Pending Prescriptions Disp Refills    escitalopram oxalate (LEXAPRO) 10 MG tablet [Pharmacy Med Name: ESCITALOPRAM OXALATE 10 MG Tablet] 90 tablet 0     Sig: TAKE 1 TABLET (10 MG TOTAL) BY MOUTH ONCE DAILY.       Psychiatry:  Antidepressants - SSRI Failed - 8/24/2020  1:26 PM        Failed - Office visit in past 6 months or future 90 days.     Recent Outpatient Visits            9 months ago Routine physical examination    [facility] Newton Allen MD    1 year ago Routine physical examination    [facility] Newton Allen MD    2 years ago Allergic reaction, initial encounter    [facility] Newton Allen MD    2 years ago Fatigue, unspecified type    [facility] Paula Garcia NP    2 years ago Hospital discharge follow-up    [facility] Paula Garcia NP                    Passed - Patient is at least 18 years old            Appointments  past 12m or future 3m with PCP    Date Provider   Last Visit   11/18/2019 Newton Allen MD   Next Visit   Visit date not found Newton Allen MD   ED visits in past 90 days: 0     Note composed:1:40 PM 08/25/2020

## 2020-09-22 ENCOUNTER — TELEPHONE (OUTPATIENT)
Dept: FAMILY MEDICINE | Facility: CLINIC | Age: 70
End: 2020-09-22

## 2020-09-22 DIAGNOSIS — Z12.31 BREAST CANCER SCREENING BY MAMMOGRAM: Primary | ICD-10-CM

## 2020-09-22 NOTE — TELEPHONE ENCOUNTER
----- Message from Dina Sotomayor sent at 9/22/2020  2:33 PM CDT -----  Regarding: self [phone]  .Type:  Mammogram    Caller is requesting to schedule their annual mammogram appointment.  Order is not listed in EPIC.  Please enter order and contact patient to schedule.  Name of Caller:  self   Where would they like the mammogram performed? [facility]    Would the patient rather a call back or a response via [facility]?  Call back     Best Call Back Number: [phone]

## 2020-09-23 ENCOUNTER — TELEPHONE (OUTPATIENT)
Dept: FAMILY MEDICINE | Facility: CLINIC | Age: 70
End: 2020-09-23

## 2020-09-23 NOTE — TELEPHONE ENCOUNTER
----- Message from Mohamud Higgins, Patient Care Assistant sent at 9/23/2020  4:23 PM CDT -----  Name of Who is Calling: ROWENA MADRID [[id number]]    What is the request in detail: Requesting to schedule a wellness visit and flu shot on 10/19. Please contact to further discuss and advise      Can the clinic reply by [facility]: No    [facility]:   [phone]

## 2020-09-24 ENCOUNTER — TELEPHONE (OUTPATIENT)
Dept: FAMILY MEDICINE | Facility: CLINIC | Age: 70
End: 2020-09-24

## 2020-09-24 NOTE — TELEPHONE ENCOUNTER
----- Message from Ann Henry sent at 9/24/2020  3:33 PM CDT -----  Contact: Patient [phone]  Type:  Patient Returning Call    Who Called: Patient     Who Left Message for Patient: Cindi    Does the patient know what this is regarding?: Missed call in regards to Annual Wellness Check-Flu shot    Would the patient rather a call back or a response via [facility]? Call back    Best Call Back Number: [phone]

## 2020-10-16 NOTE — TELEPHONE ENCOUNTER
Spoke with patient and informed her that she is not due for her annual until November 2020. Patient said she forgot. Still scheduled flu vaccine for 10/20/20.

## 2020-10-20 ENCOUNTER — HOSPITAL ENCOUNTER (OUTPATIENT)
Dept: RADIOLOGY | Facility: HOSPITAL | Age: 70
Discharge: HOME | End: 2020-10-20
Attending: FAMILY MEDICINE
Payer: MEDICARE

## 2020-10-20 ENCOUNTER — CLINICAL SUPPORT (OUTPATIENT)
Dept: FAMILY MEDICINE | Facility: CLINIC | Age: 70
End: 2020-10-20
Payer: MEDICARE

## 2020-10-20 DIAGNOSIS — Z23 NEED FOR INFLUENZA VACCINATION: Primary | ICD-10-CM

## 2020-10-20 DIAGNOSIS — Z12.31 BREAST CANCER SCREENING BY MAMMOGRAM: ICD-10-CM

## 2020-10-20 PROCEDURE — 99499 NO LOS: ICD-10-PCS | Mod: HCNC,S$GLB,, | Performed by: FAMILY MEDICINE

## 2020-10-20 PROCEDURE — 90694 FLU VACCINE - QUADRIVALENT - ADJUVANTED: ICD-10-PCS | Mod: HCNC,S$GLB,, | Performed by: FAMILY MEDICINE

## 2020-10-20 PROCEDURE — 77067 MAMMO DIGITAL SCREENING BILAT WITH TOMO: ICD-10-PCS | Mod: 26,HCNC,, | Performed by: RADIOLOGY

## 2020-10-20 PROCEDURE — 90694 VACC AIIV4 NO PRSRV 0.5ML IM: CPT | Mod: HCNC,S$GLB,, | Performed by: FAMILY MEDICINE

## 2020-10-20 PROCEDURE — 99499 UNLISTED E&M SERVICE: CPT | Mod: HCNC,S$GLB,, | Performed by: FAMILY MEDICINE

## 2020-10-20 PROCEDURE — 77067 SCR MAMMO BI INCL CAD: CPT | Mod: TC,HCNC,PO

## 2020-10-20 PROCEDURE — 77063 MAMMO DIGITAL SCREENING BILAT WITH TOMO: ICD-10-PCS | Mod: 26,HCNC,, | Performed by: RADIOLOGY

## 2020-10-20 PROCEDURE — 77063 BREAST TOMOSYNTHESIS BI: CPT | Mod: 26,HCNC,, | Performed by: RADIOLOGY

## 2020-10-20 PROCEDURE — 77067 SCR MAMMO BI INCL CAD: CPT | Mod: 26,HCNC,, | Performed by: RADIOLOGY

## 2020-10-20 PROCEDURE — G0008 ADMIN INFLUENZA VIRUS VAC: HCPCS | Mod: HCNC,S$GLB,, | Performed by: FAMILY MEDICINE

## 2020-10-20 PROCEDURE — G0008 FLU VACCINE - QUADRIVALENT - ADJUVANTED: ICD-10-PCS | Mod: HCNC,S$GLB,, | Performed by: FAMILY MEDICINE

## 2020-11-16 ENCOUNTER — TELEPHONE (OUTPATIENT)
Dept: FAMILY MEDICINE | Facility: CLINIC | Age: 70
End: 2020-11-16

## 2020-11-16 DIAGNOSIS — R79.9 ABNORMAL FINDING OF BLOOD CHEMISTRY, UNSPECIFIED: ICD-10-CM

## 2020-11-16 DIAGNOSIS — Z00.00 ROUTINE PHYSICAL EXAMINATION: Primary | ICD-10-CM

## 2020-11-16 DIAGNOSIS — E66.9 OBESITY, UNSPECIFIED CLASSIFICATION, UNSPECIFIED OBESITY TYPE, UNSPECIFIED WHETHER SERIOUS COMORBIDITY PRESENT: ICD-10-CM

## 2020-11-16 DIAGNOSIS — R93.89 ABNORMAL FINDINGS ON DIAGNOSTIC IMAGING OF OTHER SPECIFIED BODY STRUCTURES: ICD-10-CM

## 2020-11-16 NOTE — TELEPHONE ENCOUNTER
----- Message from Anusha Tran sent at 11/16/2020  9:45 AM CST -----  Regarding: LABS  Contact: Patient  Type: Lab    Caller is requesting to schedule their Lab appointment prior to annual appointment.    Order is not listed in EPIC.  Please enter order and contact patient to schedule.    Name of Caller: Patient     Preferred Date and Time of Labs: before appt     Date of EPP Appointment: 12/4/2020    Where would they like the lab performed? [facility]     Would the patient rather a call back or a response via [facility]? Call back     Pt is requesting a shingles injection as well     Best Call Back Number: [phone]

## 2020-11-30 ENCOUNTER — LAB VISIT (OUTPATIENT)
Dept: LAB | Facility: HOSPITAL | Age: 70
End: 2020-11-30
Attending: FAMILY MEDICINE
Payer: MEDICARE

## 2020-11-30 DIAGNOSIS — E66.9 OBESITY, UNSPECIFIED CLASSIFICATION, UNSPECIFIED OBESITY TYPE, UNSPECIFIED WHETHER SERIOUS COMORBIDITY PRESENT: ICD-10-CM

## 2020-11-30 DIAGNOSIS — Z00.00 ROUTINE PHYSICAL EXAMINATION: ICD-10-CM

## 2020-11-30 DIAGNOSIS — R93.89 ABNORMAL FINDINGS ON DIAGNOSTIC IMAGING OF OTHER SPECIFIED BODY STRUCTURES: ICD-10-CM

## 2020-11-30 DIAGNOSIS — R79.9 ABNORMAL FINDING OF BLOOD CHEMISTRY, UNSPECIFIED: ICD-10-CM

## 2020-11-30 LAB
ALBUMIN SERPL BCP-MCNC: 3.9 G/DL (ref 3.5–5.2)
ALP SERPL-CCNC: 84 U/L (ref 55–135)
ALT SERPL W/O P-5'-P-CCNC: 14 U/L (ref 10–44)
ANION GAP SERPL CALC-SCNC: 6 MMOL/L (ref 8–16)
AST SERPL-CCNC: 20 U/L (ref 10–40)
BASOPHILS # BLD AUTO: 0.04 K/UL (ref 0–0.2)
BASOPHILS NFR BLD: 0.6 % (ref 0–1.9)
BILIRUB SERPL-MCNC: 0.8 MG/DL (ref 0.1–1)
BUN SERPL-MCNC: 13 MG/DL (ref 8–23)
CALCIUM SERPL-MCNC: 8.8 MG/DL (ref 8.7–10.5)
CHLORIDE SERPL-SCNC: 105 MMOL/L (ref 95–110)
CHOLEST SERPL-MCNC: 225 MG/DL (ref 120–199)
CHOLEST/HDLC SERPL: 4.2 {RATIO} (ref 2–5)
CO2 SERPL-SCNC: 31 MMOL/L (ref 23–29)
CREAT SERPL-MCNC: 0.8 MG/DL (ref 0.5–1.4)
DIFFERENTIAL METHOD: ABNORMAL
EOSINOPHIL # BLD AUTO: 0.1 K/UL (ref 0–0.5)
EOSINOPHIL NFR BLD: 2.1 % (ref 0–8)
ERYTHROCYTE [DISTWIDTH] IN BLOOD BY AUTOMATED COUNT: 12.9 % (ref 11.5–14.5)
EST. GFR  (AFRICAN AMERICAN): >60 ML/MIN/1.73 M^2
EST. GFR  (NON AFRICAN AMERICAN): >60 ML/MIN/1.73 M^2
ESTIMATED AVG GLUCOSE: 105 MG/DL (ref 68–131)
GLUCOSE SERPL-MCNC: 96 MG/DL (ref 70–110)
HBA1C MFR BLD HPLC: 5.3 % (ref 4–5.6)
HCT VFR BLD AUTO: 46.4 % (ref 37–48.5)
HDLC SERPL-MCNC: 53 MG/DL (ref 40–75)
HDLC SERPL: 23.6 % (ref 20–50)
HGB BLD-MCNC: 14.8 G/DL (ref 12–16)
IMM GRANULOCYTES # BLD AUTO: 0.01 K/UL (ref 0–0.04)
IMM GRANULOCYTES NFR BLD AUTO: 0.2 % (ref 0–0.5)
LDLC SERPL CALC-MCNC: 149.6 MG/DL (ref 63–159)
LYMPHOCYTES # BLD AUTO: 2.5 K/UL (ref 1–4.8)
LYMPHOCYTES NFR BLD: 38.5 % (ref 18–48)
MCH RBC QN AUTO: 31.1 PG (ref 27–31)
MCHC RBC AUTO-ENTMCNC: 31.9 G/DL (ref 32–36)
MCV RBC AUTO: 98 FL (ref 82–98)
MONOCYTES # BLD AUTO: 0.5 K/UL (ref 0.3–1)
MONOCYTES NFR BLD: 8.2 % (ref 4–15)
NEUTROPHILS # BLD AUTO: 3.3 K/UL (ref 1.8–7.7)
NEUTROPHILS NFR BLD: 50.4 % (ref 38–73)
NONHDLC SERPL-MCNC: 172 MG/DL
NRBC BLD-RTO: 0 /100 WBC
PLATELET # BLD AUTO: 218 K/UL (ref 150–350)
PMV BLD AUTO: 10.9 FL (ref 9.2–12.9)
POTASSIUM SERPL-SCNC: 4.5 MMOL/L (ref 3.5–5.1)
PROT SERPL-MCNC: 6.8 G/DL (ref 6–8.4)
RBC # BLD AUTO: 4.76 M/UL (ref 4–5.4)
SODIUM SERPL-SCNC: 142 MMOL/L (ref 136–145)
TRIGL SERPL-MCNC: 112 MG/DL (ref 30–150)
TSH SERPL DL<=0.005 MIU/L-ACNC: 2.24 UIU/ML (ref 0.4–4)
WBC # BLD AUTO: 6.59 K/UL (ref 3.9–12.7)

## 2020-11-30 PROCEDURE — 80053 COMPREHEN METABOLIC PANEL: CPT | Mod: HCNC

## 2020-11-30 PROCEDURE — 83036 HEMOGLOBIN GLYCOSYLATED A1C: CPT | Mod: HCNC

## 2020-11-30 PROCEDURE — 80061 LIPID PANEL: CPT | Mod: HCNC

## 2020-11-30 PROCEDURE — 84443 ASSAY THYROID STIM HORMONE: CPT | Mod: HCNC

## 2020-11-30 PROCEDURE — 36415 COLL VENOUS BLD VENIPUNCTURE: CPT | Mod: HCNC,PO

## 2020-11-30 PROCEDURE — 85025 COMPLETE CBC W/AUTO DIFF WBC: CPT | Mod: HCNC

## 2020-12-02 ENCOUNTER — OFFICE VISIT (OUTPATIENT)
Dept: FAMILY MEDICINE | Facility: CLINIC | Age: 70
End: 2020-12-02
Payer: MEDICARE

## 2020-12-02 VITALS
SYSTOLIC BLOOD PRESSURE: 100 MMHG | OXYGEN SATURATION: 98 % | HEART RATE: 55 BPM | WEIGHT: 198 LBS | BODY MASS INDEX: 31.82 KG/M2 | TEMPERATURE: 99 F | DIASTOLIC BLOOD PRESSURE: 64 MMHG | HEIGHT: 66 IN

## 2020-12-02 DIAGNOSIS — Z00.00 ROUTINE PHYSICAL EXAMINATION: Primary | ICD-10-CM

## 2020-12-02 DIAGNOSIS — F41.8 DEPRESSION WITH ANXIETY: ICD-10-CM

## 2020-12-02 DIAGNOSIS — Z78.0 POST-MENOPAUSAL: ICD-10-CM

## 2020-12-02 DIAGNOSIS — B00.2 ORAL HERPES SIMPLEX, NOT CURRENTLY ACTIVE: ICD-10-CM

## 2020-12-02 PROCEDURE — 99999 PR PBB SHADOW E&M-EST. PATIENT-LVL III: ICD-10-PCS | Mod: PBBFAC,HCNC,, | Performed by: FAMILY MEDICINE

## 2020-12-02 PROCEDURE — 3008F BODY MASS INDEX DOCD: CPT | Mod: HCNC,CPTII,S$GLB, | Performed by: FAMILY MEDICINE

## 2020-12-02 PROCEDURE — 3288F FALL RISK ASSESSMENT DOCD: CPT | Mod: HCNC,CPTII,S$GLB, | Performed by: FAMILY MEDICINE

## 2020-12-02 PROCEDURE — 99999 PR PBB SHADOW E&M-EST. PATIENT-LVL III: CPT | Mod: PBBFAC,HCNC,, | Performed by: FAMILY MEDICINE

## 2020-12-02 PROCEDURE — 3288F PR FALLS RISK ASSESSMENT DOCUMENTED: ICD-10-PCS | Mod: HCNC,CPTII,S$GLB, | Performed by: FAMILY MEDICINE

## 2020-12-02 PROCEDURE — 1101F PR PT FALLS ASSESS DOC 0-1 FALLS W/OUT INJ PAST YR: ICD-10-PCS | Mod: HCNC,CPTII,S$GLB, | Performed by: FAMILY MEDICINE

## 2020-12-02 PROCEDURE — 99397 PER PM REEVAL EST PAT 65+ YR: CPT | Mod: HCNC,S$GLB,, | Performed by: FAMILY MEDICINE

## 2020-12-02 PROCEDURE — 99397 PR PREVENTIVE VISIT,EST,65 & OVER: ICD-10-PCS | Mod: HCNC,S$GLB,, | Performed by: FAMILY MEDICINE

## 2020-12-02 PROCEDURE — 1101F PT FALLS ASSESS-DOCD LE1/YR: CPT | Mod: HCNC,CPTII,S$GLB, | Performed by: FAMILY MEDICINE

## 2020-12-02 PROCEDURE — 1126F AMNT PAIN NOTED NONE PRSNT: CPT | Mod: HCNC,S$GLB,, | Performed by: FAMILY MEDICINE

## 2020-12-02 PROCEDURE — 1126F PR PAIN SEVERITY QUANTIFIED, NO PAIN PRESENT: ICD-10-PCS | Mod: HCNC,S$GLB,, | Performed by: FAMILY MEDICINE

## 2020-12-02 PROCEDURE — 3008F PR BODY MASS INDEX (BMI) DOCUMENTED: ICD-10-PCS | Mod: HCNC,CPTII,S$GLB, | Performed by: FAMILY MEDICINE

## 2020-12-02 RX ORDER — ESCITALOPRAM OXALATE 10 MG/1
10 TABLET ORAL DAILY
Qty: 90 TABLET | Refills: 3 | Status: SHIPPED | OUTPATIENT
Start: 2020-12-02 | End: 2021-12-27

## 2020-12-02 RX ORDER — VALACYCLOVIR HYDROCHLORIDE 1 G/1
1000 TABLET, FILM COATED ORAL DAILY PRN
Qty: 30 TABLET | Refills: 6 | Status: SHIPPED | OUTPATIENT
Start: 2020-12-02

## 2020-12-02 NOTE — PROGRESS NOTES
[facility]  Progress Note    SUBJECTIVE:     Chief Complaint   Patient presents with    Annual Exam       HPI   Lenore Subramanian  is a 70 y.o. female here for routine physical exam. Patient has no other new complaints/problems at this time.      Review of patient's allergies indicates:   Allergen Reactions    Poison ivy [vit e-nonoxynol 9-aloe vera] Rash       Past Medical History:   Diagnosis Date    Anxiety     Asthma     as a child    Mental disorder     Depression    HERVE (obstructive sleep apnea)      Past Surgical History:   Procedure Laterality Date    COLONOSCOPY N/A 10/25/2016    Procedure: COLONOSCOPY;  Surgeon: JANINA Dominguez MD;  Location: [facility] ([address] [place] [address]);  Service: Endoscopy;  Laterality: N/A;    TONSILLECTOMY  1958    TUBAL LIGATION  1979    PP BTL     Family History   Problem Relation Age of Onset    Hypertension Mother     Diabetes Paternal Grandmother     Multiple myeloma Father     Colon cancer Sister 61    Breast cancer Maternal Aunt 72    Breast cancer Paternal Aunt 58    Hypertrophic cardiomyopathy Son     Heart disease Neg Hx     Stroke Neg Hx     Ovarian cancer Neg Hx     Melanoma Neg Hx      Social History     Tobacco Use    Smoking status: Never Smoker    Smokeless tobacco: Never Used   Substance Use Topics    Alcohol use: Yes     Alcohol/week: 7.0 standard drinks     Types: 7 Glasses of wine per week     Comment: occasionally    Drug use: No        Review of Systems   Constitutional: Negative for chills, diaphoresis and fever.   HENT: Negative for congestion, ear pain and sore throat.    Eyes: Negative for photophobia and discharge.   Respiratory: Negative for cough, shortness of breath and wheezing.    Cardiovascular: Negative for chest pain and palpitations.   Gastrointestinal: Negative for abdominal pain, constipation, diarrhea, nausea and vomiting.   Genitourinary: Negative for dysuria and hematuria.   Musculoskeletal: Negative for back pain and  myalgias.   Skin: Negative for itching and rash.   Neurological: Negative for dizziness, sensory change, focal weakness, weakness and headaches.   All other systems reviewed and are negative.    OBJECTIVE:     Vitals:    12/02/20 1037   BP: 100/64   Pulse: (!) 55   Temp: 98.5 °F (36.9 °C)     Body mass index is 31.95 kg/m².    Physical Exam   Constitutional: She is oriented to person, place, and time and well-developed, well-nourished, and in no distress. No distress.   HENT:   Head: Normocephalic and atraumatic.   Right Ear: Tympanic membrane is not perforated, not erythematous and not bulging. No hemotympanum.   Left Ear: Tympanic membrane is not perforated, not erythematous and not bulging. No hemotympanum.   Mouth/Throat: Oropharynx is clear and moist. No oropharyngeal exudate.   Eyes: Pupils are equal, round, and reactive to light. Conjunctivae and EOM are normal.   Neck: No thyromegaly present.   Cardiovascular: Normal rate, regular rhythm and normal heart sounds. Exam reveals no gallop and no friction rub.   No murmur heard.  Pulmonary/Chest: Effort normal and breath sounds normal. No respiratory distress. She has no wheezes. She has no rales.   Abdominal: Soft. Bowel sounds are normal. She exhibits no distension. There is no abdominal tenderness. There is no rebound and no guarding.   Musculoskeletal: Normal range of motion.         General: No tenderness or edema.   Lymphadenopathy:     She has no cervical adenopathy.   Neurological: She is alert and oriented to person, place, and time.   Skin: Skin is warm. No rash noted. She is not diaphoretic. No erythema.       Old records were reviewed. Labs and/or images were independently reviewed.    ASSESSMENT     1. Routine physical examination    2. Oral herpes simplex, not currently active    3. Depression with anxiety    4. Post-menopausal        PLAN:     Routine physical examination   -     We briefly discussed diet, exercise, and routine preventive exams.  All questions and comments addressed.    Oral herpes simplex, not currently active  -     valACYclovir (VALTREX) 1000 MG tablet; Take 1 tablet (1,000 mg total) by mouth daily as needed.  Dispense: 30 tablet; Refill: 6    Depression with anxiety  -     escitalopram oxalate (LEXAPRO) 10 MG tablet; Take 1 tablet (10 mg total) by mouth once daily.  Dispense: 90 tablet; Refill: 3  -     Stable. Continue current regimen.    Post-menopausal  -     DXA Bone Density Spine And Hip; Future; Expected date: 12/02/2020      RTC RADHA Allen MD  12/02/2020 10:48 AM

## 2021-01-12 ENCOUNTER — HOSPITAL ENCOUNTER (OUTPATIENT)
Dept: RADIOLOGY | Facility: CLINIC | Age: 71
Discharge: HOME | End: 2021-01-12
Attending: FAMILY MEDICINE
Payer: MEDICARE

## 2021-01-12 DIAGNOSIS — Z78.0 POST-MENOPAUSAL: ICD-10-CM

## 2021-01-12 PROCEDURE — 77080 DXA BONE DENSITY AXIAL: CPT | Mod: 26,HCNC,, | Performed by: INTERNAL MEDICINE

## 2021-01-12 PROCEDURE — 77080 DXA BONE DENSITY AXIAL: CPT | Mod: TC,HCNC,PO

## 2021-01-12 PROCEDURE — 77080 DEXA BONE DENSITY SPINE HIP: ICD-10-PCS | Mod: 26,HCNC,, | Performed by: INTERNAL MEDICINE

## 2021-01-15 DIAGNOSIS — J31.0 CHRONIC RHINITIS: ICD-10-CM

## 2021-01-15 RX ORDER — CETIRIZINE HYDROCHLORIDE 10 MG/1
TABLET ORAL
Qty: 90 TABLET | Refills: 0 | Status: SHIPPED | OUTPATIENT
Start: 2021-01-15

## 2021-01-15 RX ORDER — FLUTICASONE PROPIONATE 50 MCG
SPRAY, SUSPENSION (ML) NASAL
Qty: 48 G | Refills: 5 | Status: SHIPPED | OUTPATIENT
Start: 2021-01-15

## 2021-04-01 ENCOUNTER — IMMUNIZATION (OUTPATIENT)
Dept: OBSTETRICS AND GYNECOLOGY | Facility: CLINIC | Age: 71
End: 2021-04-01
Payer: MEDICARE

## 2021-04-01 DIAGNOSIS — Z23 NEED FOR VACCINATION: Primary | ICD-10-CM

## 2021-04-01 PROCEDURE — 0001A COVID-19, MRNA, LNP-S, PF, 30 MCG/0.3 ML DOSE VACCINE: CPT | Mod: CV19,S$GLB,, | Performed by: FAMILY MEDICINE

## 2021-04-01 PROCEDURE — 91300 COVID-19, MRNA, LNP-S, PF, 30 MCG/0.3 ML DOSE VACCINE: CPT | Mod: S$GLB,,, | Performed by: FAMILY MEDICINE

## 2021-04-01 PROCEDURE — 0001A COVID-19, MRNA, LNP-S, PF, 30 MCG/0.3 ML DOSE VACCINE: ICD-10-PCS | Mod: CV19,S$GLB,, | Performed by: FAMILY MEDICINE

## 2021-04-01 PROCEDURE — 91300 COVID-19, MRNA, LNP-S, PF, 30 MCG/0.3 ML DOSE VACCINE: ICD-10-PCS | Mod: S$GLB,,, | Performed by: FAMILY MEDICINE

## 2021-04-22 ENCOUNTER — IMMUNIZATION (OUTPATIENT)
Dept: OBSTETRICS AND GYNECOLOGY | Facility: CLINIC | Age: 71
End: 2021-04-22
Payer: MEDICARE

## 2021-04-22 DIAGNOSIS — Z23 NEED FOR VACCINATION: Primary | ICD-10-CM

## 2021-04-22 PROCEDURE — 0002A COVID-19, MRNA, LNP-S, PF, 30 MCG/0.3 ML DOSE VACCINE: CPT | Mod: HCNC,PBBFAC | Performed by: FAMILY MEDICINE

## 2021-04-22 PROCEDURE — 91300 COVID-19, MRNA, LNP-S, PF, 30 MCG/0.3 ML DOSE VACCINE: CPT | Mod: HCNC,PBBFAC | Performed by: FAMILY MEDICINE

## 2021-05-13 LAB — FECAL GLOBIN BY IMMUNOCHEM. (MEDICARE): NEGATIVE

## 2021-06-02 ENCOUNTER — TELEPHONE (OUTPATIENT)
Dept: FAMILY MEDICINE | Facility: CLINIC | Age: 71
End: 2021-06-02

## 2021-06-02 DIAGNOSIS — R79.9 ABNORMAL FINDING OF BLOOD CHEMISTRY, UNSPECIFIED: Primary | ICD-10-CM

## 2021-06-04 ENCOUNTER — LAB VISIT (OUTPATIENT)
Dept: LAB | Facility: HOSPITAL | Age: 71
End: 2021-06-04
Attending: FAMILY MEDICINE
Payer: MEDICARE

## 2021-06-04 DIAGNOSIS — R79.9 ABNORMAL FINDING OF BLOOD CHEMISTRY, UNSPECIFIED: ICD-10-CM

## 2021-06-04 LAB
CHOLEST SERPL-MCNC: 226 MG/DL (ref 120–199)
CHOLEST/HDLC SERPL: 3.6 {RATIO} (ref 2–5)
HDLC SERPL-MCNC: 63 MG/DL (ref 40–75)
HDLC SERPL: 27.9 % (ref 20–50)
LDLC SERPL CALC-MCNC: 137.4 MG/DL (ref 63–159)
NONHDLC SERPL-MCNC: 163 MG/DL
TRIGL SERPL-MCNC: 128 MG/DL (ref 30–150)

## 2021-06-04 PROCEDURE — 36415 COLL VENOUS BLD VENIPUNCTURE: CPT | Mod: PO | Performed by: FAMILY MEDICINE

## 2021-06-04 PROCEDURE — 80061 LIPID PANEL: CPT | Performed by: FAMILY MEDICINE

## 2021-06-17 ENCOUNTER — PATIENT OUTREACH (OUTPATIENT)
Dept: ADMINISTRATIVE | Facility: HOSPITAL | Age: 71
End: 2021-06-17

## 2021-10-20 ENCOUNTER — IMMUNIZATION (OUTPATIENT)
Dept: OBSTETRICS AND GYNECOLOGY | Facility: CLINIC | Age: 71
End: 2021-10-20
Payer: MEDICARE

## 2021-10-20 DIAGNOSIS — Z23 NEED FOR VACCINATION: Primary | ICD-10-CM

## 2021-10-20 PROCEDURE — 0003A COVID-19, MRNA, LNP-S, PF, 30 MCG/0.3 ML DOSE VACCINE: CPT | Mod: HCNC,PBBFAC | Performed by: FAMILY MEDICINE

## 2021-10-20 PROCEDURE — 91300 COVID-19, MRNA, LNP-S, PF, 30 MCG/0.3 ML DOSE VACCINE: CPT | Mod: HCNC,PBBFAC | Performed by: FAMILY MEDICINE

## 2021-11-10 ENCOUNTER — TELEPHONE (OUTPATIENT)
Dept: FAMILY MEDICINE | Facility: CLINIC | Age: 71
End: 2021-11-10
Payer: MEDICARE

## 2021-11-10 DIAGNOSIS — R79.9 ABNORMAL FINDING OF BLOOD CHEMISTRY, UNSPECIFIED: ICD-10-CM

## 2021-11-10 DIAGNOSIS — Z12.31 BREAST CANCER SCREENING BY MAMMOGRAM: Primary | ICD-10-CM

## 2021-11-10 DIAGNOSIS — R93.89 ABNORMAL FINDINGS ON DIAGNOSTIC IMAGING OF OTHER SPECIFIED BODY STRUCTURES: ICD-10-CM

## 2021-11-10 DIAGNOSIS — Z00.00 ROUTINE PHYSICAL EXAMINATION: ICD-10-CM

## 2021-12-01 ENCOUNTER — HOSPITAL ENCOUNTER (OUTPATIENT)
Dept: RADIOLOGY | Facility: HOSPITAL | Age: 71
Discharge: HOME | End: 2021-12-01
Attending: FAMILY MEDICINE
Payer: MEDICARE

## 2021-12-01 DIAGNOSIS — Z12.31 BREAST CANCER SCREENING BY MAMMOGRAM: ICD-10-CM

## 2021-12-01 PROCEDURE — 77067 MAMMO DIGITAL SCREENING BILAT WITH TOMO: ICD-10-PCS | Mod: 26,HCNC,, | Performed by: RADIOLOGY

## 2021-12-01 PROCEDURE — 77067 SCR MAMMO BI INCL CAD: CPT | Mod: 26,HCNC,, | Performed by: RADIOLOGY

## 2021-12-01 PROCEDURE — 77063 BREAST TOMOSYNTHESIS BI: CPT | Mod: 26,HCNC,, | Performed by: RADIOLOGY

## 2021-12-01 PROCEDURE — 77063 MAMMO DIGITAL SCREENING BILAT WITH TOMO: ICD-10-PCS | Mod: 26,HCNC,, | Performed by: RADIOLOGY

## 2021-12-01 PROCEDURE — 77067 SCR MAMMO BI INCL CAD: CPT | Mod: TC,HCNC,PO

## 2021-12-08 ENCOUNTER — OFFICE VISIT (OUTPATIENT)
Dept: FAMILY MEDICINE | Facility: CLINIC | Age: 71
End: 2021-12-08
Payer: MEDICARE

## 2021-12-08 VITALS
OXYGEN SATURATION: 96 % | HEART RATE: 58 BPM | DIASTOLIC BLOOD PRESSURE: 68 MMHG | WEIGHT: 197 LBS | BODY MASS INDEX: 31.66 KG/M2 | TEMPERATURE: 99 F | SYSTOLIC BLOOD PRESSURE: 120 MMHG | HEIGHT: 66 IN

## 2021-12-08 DIAGNOSIS — R93.89 ABNORMAL FINDINGS ON DIAGNOSTIC IMAGING OF OTHER SPECIFIED BODY STRUCTURES: ICD-10-CM

## 2021-12-08 DIAGNOSIS — Z00.00 ROUTINE PHYSICAL EXAMINATION: Primary | ICD-10-CM

## 2021-12-08 DIAGNOSIS — R79.9 ABNORMAL FINDING OF BLOOD CHEMISTRY, UNSPECIFIED: ICD-10-CM

## 2021-12-08 DIAGNOSIS — Z23 NEED FOR IMMUNIZATION AGAINST INFLUENZA: ICD-10-CM

## 2021-12-08 PROCEDURE — 99397 PER PM REEVAL EST PAT 65+ YR: CPT | Mod: HCNC,S$GLB,, | Performed by: FAMILY MEDICINE

## 2021-12-08 PROCEDURE — G0008 FLU VACCINE - QUADRIVALENT - ADJUVANTED: ICD-10-PCS | Mod: HCNC,S$GLB,, | Performed by: FAMILY MEDICINE

## 2021-12-08 PROCEDURE — 90694 VACC AIIV4 NO PRSRV 0.5ML IM: CPT | Mod: HCNC,S$GLB,, | Performed by: FAMILY MEDICINE

## 2021-12-08 PROCEDURE — G0008 ADMIN INFLUENZA VIRUS VAC: HCPCS | Mod: HCNC,S$GLB,, | Performed by: FAMILY MEDICINE

## 2021-12-08 PROCEDURE — 99999 PR PBB SHADOW E&M-EST. PATIENT-LVL III: ICD-10-PCS | Mod: PBBFAC,HCNC,, | Performed by: FAMILY MEDICINE

## 2021-12-08 PROCEDURE — 99999 PR PBB SHADOW E&M-EST. PATIENT-LVL III: CPT | Mod: PBBFAC,HCNC,, | Performed by: FAMILY MEDICINE

## 2021-12-08 PROCEDURE — 90694 FLU VACCINE - QUADRIVALENT - ADJUVANTED: ICD-10-PCS | Mod: HCNC,S$GLB,, | Performed by: FAMILY MEDICINE

## 2021-12-08 PROCEDURE — 99397 PR PREVENTIVE VISIT,EST,65 & OVER: ICD-10-PCS | Mod: HCNC,S$GLB,, | Performed by: FAMILY MEDICINE

## 2021-12-09 ENCOUNTER — LAB VISIT (OUTPATIENT)
Dept: LAB | Facility: HOSPITAL | Age: 71
End: 2021-12-09
Attending: FAMILY MEDICINE
Payer: MEDICARE

## 2021-12-09 DIAGNOSIS — R93.89 ABNORMAL FINDINGS ON DIAGNOSTIC IMAGING OF OTHER SPECIFIED BODY STRUCTURES: ICD-10-CM

## 2021-12-09 DIAGNOSIS — Z00.00 ROUTINE PHYSICAL EXAMINATION: ICD-10-CM

## 2021-12-09 DIAGNOSIS — R79.9 ABNORMAL FINDING OF BLOOD CHEMISTRY, UNSPECIFIED: ICD-10-CM

## 2021-12-09 LAB
ALBUMIN SERPL BCP-MCNC: 4.2 G/DL (ref 3.5–5.2)
ALP SERPL-CCNC: 73 U/L (ref 55–135)
ALT SERPL W/O P-5'-P-CCNC: 14 U/L (ref 10–44)
ANION GAP SERPL CALC-SCNC: 11 MMOL/L (ref 8–16)
AST SERPL-CCNC: 19 U/L (ref 10–40)
BASOPHILS # BLD AUTO: 0.04 K/UL (ref 0–0.2)
BASOPHILS NFR BLD: 0.5 % (ref 0–1.9)
BILIRUB SERPL-MCNC: 0.8 MG/DL (ref 0.1–1)
BUN SERPL-MCNC: 9 MG/DL (ref 8–23)
CALCIUM SERPL-MCNC: 9.8 MG/DL (ref 8.7–10.5)
CHLORIDE SERPL-SCNC: 107 MMOL/L (ref 95–110)
CHOLEST SERPL-MCNC: 217 MG/DL (ref 120–199)
CHOLEST/HDLC SERPL: 3.4 {RATIO} (ref 2–5)
CO2 SERPL-SCNC: 25 MMOL/L (ref 23–29)
CREAT SERPL-MCNC: 0.8 MG/DL (ref 0.5–1.4)
DIFFERENTIAL METHOD: ABNORMAL
EOSINOPHIL # BLD AUTO: 0.2 K/UL (ref 0–0.5)
EOSINOPHIL NFR BLD: 2.2 % (ref 0–8)
ERYTHROCYTE [DISTWIDTH] IN BLOOD BY AUTOMATED COUNT: 13.2 % (ref 11.5–14.5)
EST. GFR  (AFRICAN AMERICAN): >60 ML/MIN/1.73 M^2
EST. GFR  (NON AFRICAN AMERICAN): >60 ML/MIN/1.73 M^2
ESTIMATED AVG GLUCOSE: 108 MG/DL (ref 68–131)
GLUCOSE SERPL-MCNC: 88 MG/DL (ref 70–110)
HBA1C MFR BLD: 5.4 % (ref 4–5.6)
HCT VFR BLD AUTO: 47.5 % (ref 37–48.5)
HDLC SERPL-MCNC: 63 MG/DL (ref 40–75)
HDLC SERPL: 29 % (ref 20–50)
HGB BLD-MCNC: 15.2 G/DL (ref 12–16)
IMM GRANULOCYTES # BLD AUTO: 0.02 K/UL (ref 0–0.04)
IMM GRANULOCYTES NFR BLD AUTO: 0.2 % (ref 0–0.5)
LDLC SERPL CALC-MCNC: 136 MG/DL (ref 63–159)
LYMPHOCYTES # BLD AUTO: 2.1 K/UL (ref 1–4.8)
LYMPHOCYTES NFR BLD: 25.6 % (ref 18–48)
MCH RBC QN AUTO: 31.3 PG (ref 27–31)
MCHC RBC AUTO-ENTMCNC: 32 G/DL (ref 32–36)
MCV RBC AUTO: 98 FL (ref 82–98)
MONOCYTES # BLD AUTO: 0.7 K/UL (ref 0.3–1)
MONOCYTES NFR BLD: 8.1 % (ref 4–15)
NEUTROPHILS # BLD AUTO: 5.1 K/UL (ref 1.8–7.7)
NEUTROPHILS NFR BLD: 63.4 % (ref 38–73)
NONHDLC SERPL-MCNC: 154 MG/DL
NRBC BLD-RTO: 0 /100 WBC
PLATELET # BLD AUTO: 217 K/UL (ref 150–450)
PMV BLD AUTO: 11.2 FL (ref 9.2–12.9)
POTASSIUM SERPL-SCNC: 5.1 MMOL/L (ref 3.5–5.1)
PROT SERPL-MCNC: 7.1 G/DL (ref 6–8.4)
RBC # BLD AUTO: 4.85 M/UL (ref 4–5.4)
SODIUM SERPL-SCNC: 143 MMOL/L (ref 136–145)
T4 FREE SERPL-MCNC: 0.89 NG/DL (ref 0.71–1.51)
TRIGL SERPL-MCNC: 90 MG/DL (ref 30–150)
TSH SERPL DL<=0.005 MIU/L-ACNC: 2.8 UIU/ML (ref 0.4–4)
WBC # BLD AUTO: 8.1 K/UL (ref 3.9–12.7)

## 2021-12-09 PROCEDURE — 84443 ASSAY THYROID STIM HORMONE: CPT | Mod: HCNC | Performed by: FAMILY MEDICINE

## 2021-12-09 PROCEDURE — 85025 COMPLETE CBC W/AUTO DIFF WBC: CPT | Mod: HCNC | Performed by: FAMILY MEDICINE

## 2021-12-09 PROCEDURE — 80061 LIPID PANEL: CPT | Mod: HCNC | Performed by: FAMILY MEDICINE

## 2021-12-09 PROCEDURE — 80053 COMPREHEN METABOLIC PANEL: CPT | Mod: HCNC | Performed by: FAMILY MEDICINE

## 2021-12-09 PROCEDURE — 84439 ASSAY OF FREE THYROXINE: CPT | Mod: HCNC | Performed by: FAMILY MEDICINE

## 2021-12-09 PROCEDURE — 83036 HEMOGLOBIN GLYCOSYLATED A1C: CPT | Mod: HCNC | Performed by: FAMILY MEDICINE

## 2021-12-09 PROCEDURE — 36415 COLL VENOUS BLD VENIPUNCTURE: CPT | Mod: HCNC,PO | Performed by: FAMILY MEDICINE

## 2022-05-17 DIAGNOSIS — F41.8 DEPRESSION WITH ANXIETY: ICD-10-CM

## 2022-05-17 RX ORDER — ESCITALOPRAM OXALATE 10 MG/1
10 TABLET ORAL DAILY
Qty: 90 TABLET | Refills: 1 | Status: SHIPPED | OUTPATIENT
Start: 2022-05-17 | End: 2023-02-23

## 2022-05-17 NOTE — TELEPHONE ENCOUNTER
No new care gaps identified.  [facility] Embedded Care Gaps. Reference number: [id number]. 5/17/2022   3:41:17 PM CDT

## 2022-05-19 NOTE — TELEPHONE ENCOUNTER
Called Patient to inform that pharmacy requested Rx. refill was authorized.  No answer.  Left detailed voice message on an identified.

## 2022-08-23 ENCOUNTER — PES CALL (OUTPATIENT)
Dept: ADMINISTRATIVE | Facility: CLINIC | Age: 72
End: 2022-08-23
Payer: MEDICARE

## 2022-08-29 ENCOUNTER — TELEPHONE (OUTPATIENT)
Dept: ADMINISTRATIVE | Facility: CLINIC | Age: 72
End: 2022-08-29
Payer: MEDICARE

## 2022-08-30 ENCOUNTER — OFFICE VISIT (OUTPATIENT)
Dept: FAMILY MEDICINE | Facility: CLINIC | Age: 72
End: 2022-08-30
Payer: MEDICARE

## 2022-08-30 VITALS
WEIGHT: 196.88 LBS | OXYGEN SATURATION: 97 % | HEART RATE: 57 BPM | HEIGHT: 66 IN | DIASTOLIC BLOOD PRESSURE: 68 MMHG | TEMPERATURE: 99 F | BODY MASS INDEX: 31.64 KG/M2 | SYSTOLIC BLOOD PRESSURE: 112 MMHG

## 2022-08-30 DIAGNOSIS — Z00.00 ENCOUNTER FOR PREVENTIVE HEALTH EXAMINATION: Primary | ICD-10-CM

## 2022-08-30 DIAGNOSIS — M15.9 PRIMARY OSTEOARTHRITIS INVOLVING MULTIPLE JOINTS: ICD-10-CM

## 2022-08-30 DIAGNOSIS — F41.8 DEPRESSION WITH ANXIETY: ICD-10-CM

## 2022-08-30 DIAGNOSIS — J31.0 CHRONIC RHINITIS: ICD-10-CM

## 2022-08-30 DIAGNOSIS — G47.33 OSA (OBSTRUCTIVE SLEEP APNEA): ICD-10-CM

## 2022-08-30 DIAGNOSIS — E66.09 CLASS 1 OBESITY DUE TO EXCESS CALORIES WITH SERIOUS COMORBIDITY AND BODY MASS INDEX (BMI) OF 31.0 TO 31.9 IN ADULT: ICD-10-CM

## 2022-08-30 PROCEDURE — G0439 PR MEDICARE ANNUAL WELLNESS SUBSEQUENT VISIT: ICD-10-PCS | Mod: S$GLB,,, | Performed by: NURSE PRACTITIONER

## 2022-08-30 PROCEDURE — 3078F PR MOST RECENT DIASTOLIC BLOOD PRESSURE < 80 MM HG: ICD-10-PCS | Mod: CPTII,S$GLB,, | Performed by: NURSE PRACTITIONER

## 2022-08-30 PROCEDURE — 3008F PR BODY MASS INDEX (BMI) DOCUMENTED: ICD-10-PCS | Mod: CPTII,S$GLB,, | Performed by: NURSE PRACTITIONER

## 2022-08-30 PROCEDURE — 1159F PR MEDICATION LIST DOCUMENTED IN MEDICAL RECORD: ICD-10-PCS | Mod: CPTII,S$GLB,, | Performed by: NURSE PRACTITIONER

## 2022-08-30 PROCEDURE — 1101F PT FALLS ASSESS-DOCD LE1/YR: CPT | Mod: CPTII,S$GLB,, | Performed by: NURSE PRACTITIONER

## 2022-08-30 PROCEDURE — G0439 PPPS, SUBSEQ VISIT: HCPCS | Mod: S$GLB,,, | Performed by: NURSE PRACTITIONER

## 2022-08-30 PROCEDURE — 1125F PR PAIN SEVERITY QUANTIFIED, PAIN PRESENT: ICD-10-PCS | Mod: CPTII,S$GLB,, | Performed by: NURSE PRACTITIONER

## 2022-08-30 PROCEDURE — 1125F AMNT PAIN NOTED PAIN PRSNT: CPT | Mod: CPTII,S$GLB,, | Performed by: NURSE PRACTITIONER

## 2022-08-30 PROCEDURE — 1159F MED LIST DOCD IN RCRD: CPT | Mod: CPTII,S$GLB,, | Performed by: NURSE PRACTITIONER

## 2022-08-30 PROCEDURE — 3288F PR FALLS RISK ASSESSMENT DOCUMENTED: ICD-10-PCS | Mod: CPTII,S$GLB,, | Performed by: NURSE PRACTITIONER

## 2022-08-30 PROCEDURE — 3078F DIAST BP <80 MM HG: CPT | Mod: CPTII,S$GLB,, | Performed by: NURSE PRACTITIONER

## 2022-08-30 PROCEDURE — 99999 PR PBB SHADOW E&M-EST. PATIENT-LVL IV: ICD-10-PCS | Mod: PBBFAC,,, | Performed by: NURSE PRACTITIONER

## 2022-08-30 PROCEDURE — 1170F PR FUNCTIONAL STATUS ASSESSED: ICD-10-PCS | Mod: CPTII,S$GLB,, | Performed by: NURSE PRACTITIONER

## 2022-08-30 PROCEDURE — 3288F FALL RISK ASSESSMENT DOCD: CPT | Mod: CPTII,S$GLB,, | Performed by: NURSE PRACTITIONER

## 2022-08-30 PROCEDURE — 1170F FXNL STATUS ASSESSED: CPT | Mod: CPTII,S$GLB,, | Performed by: NURSE PRACTITIONER

## 2022-08-30 PROCEDURE — 99999 PR PBB SHADOW E&M-EST. PATIENT-LVL IV: CPT | Mod: PBBFAC,,, | Performed by: NURSE PRACTITIONER

## 2022-08-30 PROCEDURE — 3008F BODY MASS INDEX DOCD: CPT | Mod: CPTII,S$GLB,, | Performed by: NURSE PRACTITIONER

## 2022-08-30 PROCEDURE — 3074F PR MOST RECENT SYSTOLIC BLOOD PRESSURE < 130 MM HG: ICD-10-PCS | Mod: CPTII,S$GLB,, | Performed by: NURSE PRACTITIONER

## 2022-08-30 PROCEDURE — 1160F RVW MEDS BY RX/DR IN RCRD: CPT | Mod: CPTII,S$GLB,, | Performed by: NURSE PRACTITIONER

## 2022-08-30 PROCEDURE — 3074F SYST BP LT 130 MM HG: CPT | Mod: CPTII,S$GLB,, | Performed by: NURSE PRACTITIONER

## 2022-08-30 PROCEDURE — 1160F PR REVIEW ALL MEDS BY PRESCRIBER/CLIN PHARMACIST DOCUMENTED: ICD-10-PCS | Mod: CPTII,S$GLB,, | Performed by: NURSE PRACTITIONER

## 2022-08-30 PROCEDURE — 1101F PR PT FALLS ASSESS DOC 0-1 FALLS W/OUT INJ PAST YR: ICD-10-PCS | Mod: CPTII,S$GLB,, | Performed by: NURSE PRACTITIONER

## 2022-08-30 NOTE — PROGRESS NOTES
"  Lenore Subramanian presented for a  Medicare AWV and comprehensive Health Risk Assessment today. The following components were reviewed and updated:    Medical history  Family History  Social history  Allergies and Current Medications  Health Risk Assessment  Health Maintenance  Care Team       ** See Completed Assessments for Annual Wellness Visit within the encounter summary.**       The following assessments were completed:  Living Situation  CAGE  Depression Screening  Timed Get Up and Go  Whisper Test  Cognitive Function Screening  Nutrition Screening  ADL Screening  PAQ Screening          Vitals:    08/30/22 1459   BP: 112/68   BP Location: Right arm   Patient Position: Sitting   BP Method: Large (Manual)   Pulse: (!) 57   Temp: 98.8 °F (37.1 °C)   TempSrc: Oral   SpO2: 97%   Weight: 89.3 kg (196 lb 13.9 oz)   Height: 5' 6" (1.676 m)     Body mass index is 31.78 kg/m².  Physical Exam  Vitals and nursing note reviewed.   Constitutional:       Appearance: Normal appearance.   Cardiovascular:      Rate and Rhythm: Normal rate.      Pulses: Normal pulses.      Heart sounds: Normal heart sounds.   Pulmonary:      Effort: Pulmonary effort is normal.      Breath sounds: Normal breath sounds.   Abdominal:      General: Bowel sounds are normal.      Palpations: Abdomen is soft.   Musculoskeletal:         General: Normal range of motion.   Neurological:      Mental Status: She is alert and oriented to person, place, and time.   Psychiatric:         Mood and Affect: Mood normal.         Behavior: Behavior normal.             Diagnoses and health risks identified today and associated recommendations/orders:    1. Encounter for preventive health examination  Pt was seen today for an Annual Wellness visit. Healthcare maintenance and screening recommendations were discussed and updated as indicated. Return in one year for AWV.    Review current opioid prescriptions:n/a  Screen for potential Substance Use Disorders:n/a    2. " Class 1 obesity due to excess calories with serious comorbidity and body mass index (BMI) of 31.0 to 31.9 in adult  The patient is asked to make an attempt to improve diet and exercise patterns to aid in medical management of this problem.    3. Depression with anxiety  The current medical regimen is effective;  continue present plan and medications.    4. Chronic rhinitis  The current medical regimen is effective;  continue present plan and medications.    5. Primary osteoarthritis involving multiple joints  The current medical regimen is effective;  continue present plan and medications.    6. HERVE (obstructive sleep apnea)  The current medical regimen is effective;  continue present plan and medications.        Provided Lenore with a 5-10 year written screening schedule and personal prevention plan. Recommendations were developed using the USPSTF age appropriate recommendations. Education, counseling, and referrals were provided as needed. After Visit Summary printed and given to patient which includes a list of additional screenings\tests needed.    Follow up in about 3 months (around 12/8/2022) with provider.    SHANNON Longoria  I offered to discuss advanced care planning, including how to pick a person who would make decisions for you if you were unable to make them for yourself, called a health care power of , and what kind of decisions you might make such as use of life sustaining treatments such as ventilators and tube feeding when faced with a life limiting illness recorded on a living will that they will need to know. (How you want to be cared for as you near the end of your natural life)     X Patient is interested in learning more about how to make advanced directives.  I provided them paperwork and offered to discuss this with them.

## 2022-08-30 NOTE — PATIENT INSTRUCTIONS
Counseling and Referral of Other Preventative  (Italic type indicates deductible and co-insurance are waived)    Patient Name: Lenore Subramanian  Today's Date: 8/30/2022    Health Maintenance       Date Due Completion Date    COVID-19 Vaccine (4 - Booster for Pfizer series) 02/20/2022 10/20/2021    Influenza Vaccine (1) 09/01/2022 12/8/2021    Mammogram 12/01/2022 12/1/2021    Override on 8/18/2017: Done    DEXA Scan 01/12/2024 1/12/2021    Colorectal Cancer Screening 10/25/2026 5/13/2021    Lipid Panel 12/09/2026 12/9/2021    TETANUS VACCINE 05/03/2027 5/3/2017        No orders of the defined types were placed in this encounter.    The following information is provided to all patients.  This information is to help you find resources for any of the problems found today that may be affecting your health:                Living healthy guide: www.[facility]      Understanding Diabetes: www.diabetes.org      Eating healthy: www.cdc.gov/healthyweight      [facility] home safety checklist: www.cdc.gov/steadi/patient.html      Agency on Aging: www.goea.[place].[facility]      Alcoholics anonymous (AA): www.aa.org      Physical Activity: www.trinity.nih.gov/ua2kuad      Tobacco use: www.quitwithusla.org

## 2022-11-14 ENCOUNTER — TELEPHONE (OUTPATIENT)
Dept: FAMILY MEDICINE | Facility: CLINIC | Age: 72
End: 2022-11-14
Payer: MEDICARE

## 2022-11-14 NOTE — TELEPHONE ENCOUNTER
----- Message from Demi Bender sent at 11/14/2022  8:24 AM CST -----  Type: Patient Call Back    Who called: Self     What is the request in detail: Lab Orders    Can the clinic reply by [facility]? No     Would the patient rather a call back or a response via [facility]? Call     Best call back number: [phone]

## 2022-11-30 ENCOUNTER — LAB VISIT (OUTPATIENT)
Dept: LAB | Facility: HOSPITAL | Age: 72
End: 2022-11-30
Attending: FAMILY MEDICINE
Payer: MEDICARE

## 2022-11-30 DIAGNOSIS — R79.9 ABNORMAL FINDING OF BLOOD CHEMISTRY, UNSPECIFIED: ICD-10-CM

## 2022-11-30 DIAGNOSIS — Z00.00 ROUTINE PHYSICAL EXAMINATION: ICD-10-CM

## 2022-11-30 DIAGNOSIS — R93.89 ABNORMAL FINDINGS ON DIAGNOSTIC IMAGING OF OTHER SPECIFIED BODY STRUCTURES: ICD-10-CM

## 2022-11-30 LAB
ALBUMIN SERPL BCP-MCNC: 4.1 G/DL (ref 3.5–5.2)
ALP SERPL-CCNC: 83 U/L (ref 55–135)
ALT SERPL W/O P-5'-P-CCNC: 13 U/L (ref 10–44)
ANION GAP SERPL CALC-SCNC: 12 MMOL/L (ref 8–16)
AST SERPL-CCNC: 20 U/L (ref 10–40)
BASOPHILS # BLD AUTO: 0.05 K/UL (ref 0–0.2)
BASOPHILS NFR BLD: 0.8 % (ref 0–1.9)
BILIRUB SERPL-MCNC: 0.8 MG/DL (ref 0.1–1)
BUN SERPL-MCNC: 8 MG/DL (ref 8–23)
CALCIUM SERPL-MCNC: 9.1 MG/DL (ref 8.7–10.5)
CHLORIDE SERPL-SCNC: 102 MMOL/L (ref 95–110)
CHOLEST SERPL-MCNC: 224 MG/DL (ref 120–199)
CHOLEST/HDLC SERPL: 3.6 {RATIO} (ref 2–5)
CO2 SERPL-SCNC: 25 MMOL/L (ref 23–29)
CREAT SERPL-MCNC: 0.8 MG/DL (ref 0.5–1.4)
DIFFERENTIAL METHOD: ABNORMAL
EOSINOPHIL # BLD AUTO: 0.1 K/UL (ref 0–0.5)
EOSINOPHIL NFR BLD: 2.1 % (ref 0–8)
ERYTHROCYTE [DISTWIDTH] IN BLOOD BY AUTOMATED COUNT: 13.2 % (ref 11.5–14.5)
EST. GFR  (NO RACE VARIABLE): >60 ML/MIN/1.73 M^2
ESTIMATED AVG GLUCOSE: 103 MG/DL (ref 68–131)
GLUCOSE SERPL-MCNC: 78 MG/DL (ref 70–110)
HBA1C MFR BLD: 5.2 % (ref 4–5.6)
HCT VFR BLD AUTO: 46.3 % (ref 37–48.5)
HDLC SERPL-MCNC: 62 MG/DL (ref 40–75)
HDLC SERPL: 27.7 % (ref 20–50)
HGB BLD-MCNC: 15 G/DL (ref 12–16)
IMM GRANULOCYTES # BLD AUTO: 0.01 K/UL (ref 0–0.04)
IMM GRANULOCYTES NFR BLD AUTO: 0.2 % (ref 0–0.5)
LDLC SERPL CALC-MCNC: 144 MG/DL (ref 63–159)
LYMPHOCYTES # BLD AUTO: 2.7 K/UL (ref 1–4.8)
LYMPHOCYTES NFR BLD: 43.2 % (ref 18–48)
MCH RBC QN AUTO: 31.6 PG (ref 27–31)
MCHC RBC AUTO-ENTMCNC: 32.4 G/DL (ref 32–36)
MCV RBC AUTO: 98 FL (ref 82–98)
MONOCYTES # BLD AUTO: 0.5 K/UL (ref 0.3–1)
MONOCYTES NFR BLD: 8.5 % (ref 4–15)
NEUTROPHILS # BLD AUTO: 2.8 K/UL (ref 1.8–7.7)
NEUTROPHILS NFR BLD: 45.2 % (ref 38–73)
NONHDLC SERPL-MCNC: 162 MG/DL
NRBC BLD-RTO: 0 /100 WBC
PLATELET # BLD AUTO: 223 K/UL (ref 150–450)
PMV BLD AUTO: 11.6 FL (ref 9.2–12.9)
POTASSIUM SERPL-SCNC: 4.2 MMOL/L (ref 3.5–5.1)
PROT SERPL-MCNC: 6.8 G/DL (ref 6–8.4)
RBC # BLD AUTO: 4.75 M/UL (ref 4–5.4)
SODIUM SERPL-SCNC: 139 MMOL/L (ref 136–145)
TRIGL SERPL-MCNC: 90 MG/DL (ref 30–150)
TSH SERPL DL<=0.005 MIU/L-ACNC: 1.95 UIU/ML (ref 0.4–4)
WBC # BLD AUTO: 6.23 K/UL (ref 3.9–12.7)

## 2022-11-30 PROCEDURE — 84443 ASSAY THYROID STIM HORMONE: CPT | Performed by: FAMILY MEDICINE

## 2022-11-30 PROCEDURE — 80061 LIPID PANEL: CPT | Performed by: FAMILY MEDICINE

## 2022-11-30 PROCEDURE — 85025 COMPLETE CBC W/AUTO DIFF WBC: CPT | Performed by: FAMILY MEDICINE

## 2022-11-30 PROCEDURE — 80053 COMPREHEN METABOLIC PANEL: CPT | Performed by: FAMILY MEDICINE

## 2022-11-30 PROCEDURE — 36415 COLL VENOUS BLD VENIPUNCTURE: CPT | Mod: PO | Performed by: FAMILY MEDICINE

## 2022-11-30 PROCEDURE — 83036 HEMOGLOBIN GLYCOSYLATED A1C: CPT | Performed by: FAMILY MEDICINE

## 2022-12-08 ENCOUNTER — OFFICE VISIT (OUTPATIENT)
Dept: FAMILY MEDICINE | Facility: CLINIC | Age: 72
End: 2022-12-08
Payer: MEDICARE

## 2022-12-08 VITALS
HEIGHT: 66 IN | HEART RATE: 55 BPM | OXYGEN SATURATION: 96 % | TEMPERATURE: 98 F | WEIGHT: 190.06 LBS | DIASTOLIC BLOOD PRESSURE: 70 MMHG | SYSTOLIC BLOOD PRESSURE: 112 MMHG | BODY MASS INDEX: 30.54 KG/M2

## 2022-12-08 DIAGNOSIS — R79.9 ABNORMAL FINDING OF BLOOD CHEMISTRY, UNSPECIFIED: ICD-10-CM

## 2022-12-08 DIAGNOSIS — Z12.31 ENCOUNTER FOR SCREENING MAMMOGRAM FOR BREAST CANCER: ICD-10-CM

## 2022-12-08 DIAGNOSIS — Z12.11 ENCOUNTER FOR SCREENING FOR MALIGNANT NEOPLASM OF COLON: ICD-10-CM

## 2022-12-08 DIAGNOSIS — E66.09 CLASS 1 OBESITY DUE TO EXCESS CALORIES WITH SERIOUS COMORBIDITY AND BODY MASS INDEX (BMI) OF 31.0 TO 31.9 IN ADULT: ICD-10-CM

## 2022-12-08 DIAGNOSIS — R93.89 ABNORMAL FINDINGS ON DIAGNOSTIC IMAGING OF OTHER SPECIFIED BODY STRUCTURES: ICD-10-CM

## 2022-12-08 DIAGNOSIS — L98.9 SKIN LESION: ICD-10-CM

## 2022-12-08 DIAGNOSIS — Z00.00 ROUTINE PHYSICAL EXAMINATION: Primary | ICD-10-CM

## 2022-12-08 DIAGNOSIS — Z23 NEED FOR IMMUNIZATION AGAINST INFLUENZA: ICD-10-CM

## 2022-12-08 PROCEDURE — 90694 VACC AIIV4 NO PRSRV 0.5ML IM: CPT | Mod: S$GLB,,, | Performed by: FAMILY MEDICINE

## 2022-12-08 PROCEDURE — 3074F PR MOST RECENT SYSTOLIC BLOOD PRESSURE < 130 MM HG: ICD-10-PCS | Mod: CPTII,S$GLB,, | Performed by: FAMILY MEDICINE

## 2022-12-08 PROCEDURE — 99999 PR PBB SHADOW E&M-EST. PATIENT-LVL IV: CPT | Mod: PBBFAC,,, | Performed by: FAMILY MEDICINE

## 2022-12-08 PROCEDURE — 3074F SYST BP LT 130 MM HG: CPT | Mod: CPTII,S$GLB,, | Performed by: FAMILY MEDICINE

## 2022-12-08 PROCEDURE — 3008F BODY MASS INDEX DOCD: CPT | Mod: CPTII,S$GLB,, | Performed by: FAMILY MEDICINE

## 2022-12-08 PROCEDURE — 3288F FALL RISK ASSESSMENT DOCD: CPT | Mod: CPTII,S$GLB,, | Performed by: FAMILY MEDICINE

## 2022-12-08 PROCEDURE — 99397 PER PM REEVAL EST PAT 65+ YR: CPT | Mod: 25,S$GLB,, | Performed by: FAMILY MEDICINE

## 2022-12-08 PROCEDURE — 1126F AMNT PAIN NOTED NONE PRSNT: CPT | Mod: CPTII,S$GLB,, | Performed by: FAMILY MEDICINE

## 2022-12-08 PROCEDURE — 1101F PT FALLS ASSESS-DOCD LE1/YR: CPT | Mod: CPTII,S$GLB,, | Performed by: FAMILY MEDICINE

## 2022-12-08 PROCEDURE — 3044F PR MOST RECENT HEMOGLOBIN A1C LEVEL <7.0%: ICD-10-PCS | Mod: CPTII,S$GLB,, | Performed by: FAMILY MEDICINE

## 2022-12-08 PROCEDURE — 3078F DIAST BP <80 MM HG: CPT | Mod: CPTII,S$GLB,, | Performed by: FAMILY MEDICINE

## 2022-12-08 PROCEDURE — 3044F HG A1C LEVEL LT 7.0%: CPT | Mod: CPTII,S$GLB,, | Performed by: FAMILY MEDICINE

## 2022-12-08 PROCEDURE — 1159F PR MEDICATION LIST DOCUMENTED IN MEDICAL RECORD: ICD-10-PCS | Mod: CPTII,S$GLB,, | Performed by: FAMILY MEDICINE

## 2022-12-08 PROCEDURE — 99999 PR PBB SHADOW E&M-EST. PATIENT-LVL IV: ICD-10-PCS | Mod: PBBFAC,,, | Performed by: FAMILY MEDICINE

## 2022-12-08 PROCEDURE — G0008 FLU VACCINE - QUADRIVALENT - ADJUVANTED: ICD-10-PCS | Mod: S$GLB,,, | Performed by: FAMILY MEDICINE

## 2022-12-08 PROCEDURE — 99397 PR PREVENTIVE VISIT,EST,65 & OVER: ICD-10-PCS | Mod: 25,S$GLB,, | Performed by: FAMILY MEDICINE

## 2022-12-08 PROCEDURE — 3078F PR MOST RECENT DIASTOLIC BLOOD PRESSURE < 80 MM HG: ICD-10-PCS | Mod: CPTII,S$GLB,, | Performed by: FAMILY MEDICINE

## 2022-12-08 PROCEDURE — 3008F PR BODY MASS INDEX (BMI) DOCUMENTED: ICD-10-PCS | Mod: CPTII,S$GLB,, | Performed by: FAMILY MEDICINE

## 2022-12-08 PROCEDURE — 1101F PR PT FALLS ASSESS DOC 0-1 FALLS W/OUT INJ PAST YR: ICD-10-PCS | Mod: CPTII,S$GLB,, | Performed by: FAMILY MEDICINE

## 2022-12-08 PROCEDURE — G0008 ADMIN INFLUENZA VIRUS VAC: HCPCS | Mod: S$GLB,,, | Performed by: FAMILY MEDICINE

## 2022-12-08 PROCEDURE — 3288F PR FALLS RISK ASSESSMENT DOCUMENTED: ICD-10-PCS | Mod: CPTII,S$GLB,, | Performed by: FAMILY MEDICINE

## 2022-12-08 PROCEDURE — 1126F PR PAIN SEVERITY QUANTIFIED, NO PAIN PRESENT: ICD-10-PCS | Mod: CPTII,S$GLB,, | Performed by: FAMILY MEDICINE

## 2022-12-08 PROCEDURE — 90694 FLU VACCINE - QUADRIVALENT - ADJUVANTED: ICD-10-PCS | Mod: S$GLB,,, | Performed by: FAMILY MEDICINE

## 2022-12-08 PROCEDURE — 1159F MED LIST DOCD IN RCRD: CPT | Mod: CPTII,S$GLB,, | Performed by: FAMILY MEDICINE

## 2022-12-08 NOTE — PROGRESS NOTES
[facility]  Progress Note    SUBJECTIVE:     Chief Complaint   Patient presents with    Annual Exam       HPI   Lenore Subramanian  is a 72 y.o. female here for routine physical exam. Patient has no other new complaints/problems at this time.      Review of patient's allergies indicates:   Allergen Reactions    Poison ivy [vit e-nonoxynol 9-aloe vera] Rash       Past Medical History:   Diagnosis Date    Anxiety     Asthma     as a child    Mental disorder     Depression    HERVE (obstructive sleep apnea)      Past Surgical History:   Procedure Laterality Date    COLONOSCOPY N/A 10/25/2016    Procedure: COLONOSCOPY;  Surgeon: JANINA Dominguez MD;  Location: [facility] ([address] [place] [address]);  Service: Endoscopy;  Laterality: N/A;    TONSILLECTOMY  1958    TUBAL LIGATION  1979    PP BTL     Family History   Problem Relation Age of Onset    Hypertension Mother     Multiple myeloma Father     Colon cancer Sister 61    Hypertrophic cardiomyopathy Son     Breast cancer Maternal Aunt 72    Breast cancer Paternal Aunt 58    Diabetes Paternal Grandmother     Heart disease Neg Hx     Stroke Neg Hx     Ovarian cancer Neg Hx     Melanoma Neg Hx      Social History     Tobacco Use    Smoking status: Never    Smokeless tobacco: Never   Substance Use Topics    Alcohol use: Yes     Alcohol/week: 7.0 standard drinks     Types: 7 Glasses of wine per week     Comment: occasionally    Drug use: No        Review of Systems   Constitutional:  Negative for chills, diaphoresis and fever.   HENT:  Negative for congestion, ear pain and sore throat.    Eyes:  Negative for photophobia and discharge.   Respiratory:  Negative for cough, shortness of breath and wheezing.    Cardiovascular:  Negative for chest pain and palpitations.   Gastrointestinal:  Negative for abdominal pain, constipation, diarrhea, nausea and vomiting.   Genitourinary:  Negative for dysuria and hematuria.   Musculoskeletal:  Negative for back pain and myalgias.   Skin:  Negative  for itching and rash.   Neurological:  Negative for dizziness, sensory change, focal weakness, weakness and headaches.   All other systems reviewed and are negative.  OBJECTIVE:     Vitals:    12/08/22 0943   BP: 112/70   Pulse: (!) 55   Temp: 98.3 °F (36.8 °C)     Body mass index is 30.67 kg/m².    Physical Exam  Constitutional:       General: She is not in acute distress.     Appearance: She is not diaphoretic.   HENT:      Head: Normocephalic and atraumatic.      Right Ear: Tympanic membrane and ear canal normal. No hemotympanum. Tympanic membrane is not perforated, erythematous or bulging.      Left Ear: Tympanic membrane and ear canal normal. No hemotympanum. Tympanic membrane is not perforated, erythematous or bulging.      Mouth/Throat:      Pharynx: No oropharyngeal exudate.   Eyes:      Conjunctiva/sclera: Conjunctivae normal.      Pupils: Pupils are equal, round, and reactive to light.   Neck:      Thyroid: No thyromegaly.   Cardiovascular:      Rate and Rhythm: Normal rate and regular rhythm.      Heart sounds: Normal heart sounds. No murmur heard.    No friction rub. No gallop.   Pulmonary:      Effort: Pulmonary effort is normal. No respiratory distress.      Breath sounds: Normal breath sounds. No wheezing or rales.   Abdominal:      General: Bowel sounds are normal. There is no distension.      Palpations: Abdomen is soft.      Tenderness: There is no abdominal tenderness. There is no guarding or rebound.   Musculoskeletal:         General: No tenderness. Normal range of motion.   Lymphadenopathy:      Cervical: No cervical adenopathy.   Skin:     General: Skin is warm.      Findings: No erythema or rash.   Neurological:      Mental Status: She is alert and oriented to person, place, and time.       Old records were reviewed. Labs and/or images were independently reviewed.    ASSESSMENT     1. Routine physical examination    2. Need for immunization against influenza    3. Encounter for screening  mammogram for breast cancer    4. Encounter for screening for malignant neoplasm of colon    5. Class 1 obesity due to excess calories with serious comorbidity and body mass index (BMI) of 31.0 to 31.9 in adult    6. Abnormal finding of blood chemistry, unspecified    7. Abnormal findings on diagnostic imaging of other specified body structures        PLAN:     Routine physical examination  -     CBC Auto Differential; Future  -     Comprehensive Metabolic Panel; Future  -     Hemoglobin A1C; Future  -     Lipid Panel; Future  -     TSH; Future  -     T4, Free; Future  -     We briefly discussed diet, exercise, and routine preventive exams. All questions and comments addressed.    Need for immunization against influenza  -     Influenza - Quadrivalent (Adjuvanted)    Encounter for screening mammogram for breast cancer  -     Mammo Digital Screening Bilat; Future; Expected date: 12/08/2022    Encounter for screening for malignant neoplasm of colon  -     Fecal Immunochemical Test (iFOBT); Future; Expected date: 12/08/2022    Class 1 obesity due to excess calories with serious comorbidity and body mass index (BMI) of 31.0 to 31.9 in adult  -     CBC Auto Differential; Future  -     Comprehensive Metabolic Panel; Future  -     Hemoglobin A1C; Future  -     Lipid Panel; Future  -     TSH; Future  -     T4, Free; Future  -     Counseled patient about healthy diet, exercise habits, and to increase physical activity.      RTC RADHA Allen MD  12/08/2022 10:18 AM

## 2022-12-09 ENCOUNTER — TELEPHONE (OUTPATIENT)
Dept: FAMILY MEDICINE | Facility: CLINIC | Age: 72
End: 2022-12-09
Payer: MEDICARE

## 2023-01-06 ENCOUNTER — HOSPITAL ENCOUNTER (OUTPATIENT)
Dept: RADIOLOGY | Facility: HOSPITAL | Age: 73
Discharge: HOME | End: 2023-01-06
Attending: FAMILY MEDICINE
Payer: MEDICARE

## 2023-01-06 DIAGNOSIS — Z12.31 ENCOUNTER FOR SCREENING MAMMOGRAM FOR BREAST CANCER: ICD-10-CM

## 2023-01-06 PROCEDURE — 77067 SCR MAMMO BI INCL CAD: CPT | Mod: TC,HCNC,PO

## 2023-01-06 PROCEDURE — 77067 MAMMO DIGITAL SCREENING BILAT WITH TOMO: ICD-10-PCS | Mod: 26,HCNC,, | Performed by: RADIOLOGY

## 2023-01-06 PROCEDURE — 77063 BREAST TOMOSYNTHESIS BI: CPT | Mod: 26,HCNC,, | Performed by: RADIOLOGY

## 2023-01-06 PROCEDURE — 77063 MAMMO DIGITAL SCREENING BILAT WITH TOMO: ICD-10-PCS | Mod: 26,HCNC,, | Performed by: RADIOLOGY

## 2023-01-06 PROCEDURE — 77067 SCR MAMMO BI INCL CAD: CPT | Mod: 26,HCNC,, | Performed by: RADIOLOGY

## 2023-02-09 DIAGNOSIS — Z00.00 ENCOUNTER FOR MEDICARE ANNUAL WELLNESS EXAM: ICD-10-CM

## 2023-05-04 ENCOUNTER — TELEPHONE (OUTPATIENT)
Dept: FAMILY MEDICINE | Facility: CLINIC | Age: 73
End: 2023-05-04
Payer: MEDICARE

## 2023-05-04 DIAGNOSIS — E66.09 CLASS 1 OBESITY DUE TO EXCESS CALORIES WITH SERIOUS COMORBIDITY AND BODY MASS INDEX (BMI) OF 31.0 TO 31.9 IN ADULT: ICD-10-CM

## 2023-05-04 DIAGNOSIS — R79.9 ABNORMAL FINDING OF BLOOD CHEMISTRY, UNSPECIFIED: ICD-10-CM

## 2023-05-04 DIAGNOSIS — R93.89 ABNORMAL FINDINGS ON DIAGNOSTIC IMAGING OF OTHER SPECIFIED BODY STRUCTURES: ICD-10-CM

## 2023-05-04 DIAGNOSIS — Z00.00 ROUTINE PHYSICAL EXAMINATION: Primary | ICD-10-CM

## 2023-05-04 NOTE — TELEPHONE ENCOUNTER
----- Message from Tanya Martinez sent at 5/4/2023 11:04 AM CDT -----  Regarding: patient call back  Type: Patient Call Back    Who called: Self     What is the request in detail: Wants to know if she needs to do any blood work prior to her upcoming apt.     Can the clinic reply by [facility]? No     Would the patient rather a call back or a response via [facility]? Call     Best call back number:  [phone]

## 2023-05-09 ENCOUNTER — LAB VISIT (OUTPATIENT)
Dept: LAB | Facility: HOSPITAL | Age: 73
End: 2023-05-09
Attending: FAMILY MEDICINE
Payer: MEDICARE

## 2023-05-09 DIAGNOSIS — R79.9 ABNORMAL FINDING OF BLOOD CHEMISTRY, UNSPECIFIED: ICD-10-CM

## 2023-05-09 DIAGNOSIS — R93.89 ABNORMAL FINDINGS ON DIAGNOSTIC IMAGING OF OTHER SPECIFIED BODY STRUCTURES: ICD-10-CM

## 2023-05-09 DIAGNOSIS — Z00.00 ROUTINE PHYSICAL EXAMINATION: ICD-10-CM

## 2023-05-09 DIAGNOSIS — E66.09 CLASS 1 OBESITY DUE TO EXCESS CALORIES WITH SERIOUS COMORBIDITY AND BODY MASS INDEX (BMI) OF 31.0 TO 31.9 IN ADULT: ICD-10-CM

## 2023-05-09 LAB
ALBUMIN SERPL BCP-MCNC: 4.2 G/DL (ref 3.5–5.2)
ALP SERPL-CCNC: 70 U/L (ref 55–135)
ALT SERPL W/O P-5'-P-CCNC: 14 U/L (ref 10–44)
ANION GAP SERPL CALC-SCNC: 10 MMOL/L (ref 8–16)
AST SERPL-CCNC: 21 U/L (ref 10–40)
BASOPHILS # BLD AUTO: 0.05 K/UL (ref 0–0.2)
BASOPHILS NFR BLD: 0.8 % (ref 0–1.9)
BILIRUB SERPL-MCNC: 0.8 MG/DL (ref 0.1–1)
BUN SERPL-MCNC: 11 MG/DL (ref 8–23)
CALCIUM SERPL-MCNC: 9.6 MG/DL (ref 8.7–10.5)
CHLORIDE SERPL-SCNC: 105 MMOL/L (ref 95–110)
CHOLEST SERPL-MCNC: 218 MG/DL (ref 120–199)
CHOLEST/HDLC SERPL: 3.6 {RATIO} (ref 2–5)
CO2 SERPL-SCNC: 27 MMOL/L (ref 23–29)
CREAT SERPL-MCNC: 0.8 MG/DL (ref 0.5–1.4)
DIFFERENTIAL METHOD: NORMAL
EOSINOPHIL # BLD AUTO: 0.1 K/UL (ref 0–0.5)
EOSINOPHIL NFR BLD: 2.2 % (ref 0–8)
ERYTHROCYTE [DISTWIDTH] IN BLOOD BY AUTOMATED COUNT: 13.5 % (ref 11.5–14.5)
EST. GFR  (NO RACE VARIABLE): >60 ML/MIN/1.73 M^2
ESTIMATED AVG GLUCOSE: 105 MG/DL (ref 68–131)
GLUCOSE SERPL-MCNC: 89 MG/DL (ref 70–110)
HBA1C MFR BLD: 5.3 % (ref 4–5.6)
HCT VFR BLD AUTO: 45.1 % (ref 37–48.5)
HDLC SERPL-MCNC: 61 MG/DL (ref 40–75)
HDLC SERPL: 28 % (ref 20–50)
HGB BLD-MCNC: 14.7 G/DL (ref 12–16)
IMM GRANULOCYTES # BLD AUTO: 0.01 K/UL (ref 0–0.04)
IMM GRANULOCYTES NFR BLD AUTO: 0.2 % (ref 0–0.5)
LDLC SERPL CALC-MCNC: 140.8 MG/DL (ref 63–159)
LYMPHOCYTES # BLD AUTO: 2.4 K/UL (ref 1–4.8)
LYMPHOCYTES NFR BLD: 37.7 % (ref 18–48)
MCH RBC QN AUTO: 31 PG (ref 27–31)
MCHC RBC AUTO-ENTMCNC: 32.6 G/DL (ref 32–36)
MCV RBC AUTO: 95 FL (ref 82–98)
MONOCYTES # BLD AUTO: 0.6 K/UL (ref 0.3–1)
MONOCYTES NFR BLD: 9 % (ref 4–15)
NEUTROPHILS # BLD AUTO: 3.2 K/UL (ref 1.8–7.7)
NEUTROPHILS NFR BLD: 50.1 % (ref 38–73)
NONHDLC SERPL-MCNC: 157 MG/DL
NRBC BLD-RTO: 0 /100 WBC
PLATELET # BLD AUTO: 223 K/UL (ref 150–450)
PMV BLD AUTO: 11.3 FL (ref 9.2–12.9)
POTASSIUM SERPL-SCNC: 4.7 MMOL/L (ref 3.5–5.1)
PROT SERPL-MCNC: 6.9 G/DL (ref 6–8.4)
RBC # BLD AUTO: 4.74 M/UL (ref 4–5.4)
SODIUM SERPL-SCNC: 142 MMOL/L (ref 136–145)
T4 FREE SERPL-MCNC: 0.78 NG/DL (ref 0.71–1.51)
TRIGL SERPL-MCNC: 81 MG/DL (ref 30–150)
TSH SERPL DL<=0.005 MIU/L-ACNC: 3.34 UIU/ML (ref 0.4–4)
WBC # BLD AUTO: 6.45 K/UL (ref 3.9–12.7)

## 2023-05-09 PROCEDURE — 85025 COMPLETE CBC W/AUTO DIFF WBC: CPT | Performed by: FAMILY MEDICINE

## 2023-05-09 PROCEDURE — 84443 ASSAY THYROID STIM HORMONE: CPT | Performed by: FAMILY MEDICINE

## 2023-05-09 PROCEDURE — 80053 COMPREHEN METABOLIC PANEL: CPT | Performed by: FAMILY MEDICINE

## 2023-05-09 PROCEDURE — 80061 LIPID PANEL: CPT | Performed by: FAMILY MEDICINE

## 2023-05-09 PROCEDURE — 84439 ASSAY OF FREE THYROXINE: CPT | Performed by: FAMILY MEDICINE

## 2023-05-09 PROCEDURE — 36415 COLL VENOUS BLD VENIPUNCTURE: CPT | Mod: PO | Performed by: FAMILY MEDICINE

## 2023-05-09 PROCEDURE — 83036 HEMOGLOBIN GLYCOSYLATED A1C: CPT | Performed by: FAMILY MEDICINE

## 2023-05-17 ENCOUNTER — PES CALL (OUTPATIENT)
Dept: ADMINISTRATIVE | Facility: CLINIC | Age: 73
End: 2023-05-17
Payer: MEDICARE

## 2023-05-24 ENCOUNTER — LAB VISIT (OUTPATIENT)
Dept: LAB | Facility: HOSPITAL | Age: 73
End: 2023-05-24
Attending: FAMILY MEDICINE
Payer: MEDICARE

## 2023-05-24 ENCOUNTER — OFFICE VISIT (OUTPATIENT)
Dept: FAMILY MEDICINE | Facility: CLINIC | Age: 73
End: 2023-05-24
Payer: MEDICARE

## 2023-05-24 VITALS
DIASTOLIC BLOOD PRESSURE: 64 MMHG | OXYGEN SATURATION: 96 % | TEMPERATURE: 98 F | HEART RATE: 60 BPM | HEIGHT: 66 IN | SYSTOLIC BLOOD PRESSURE: 132 MMHG | WEIGHT: 189.94 LBS | BODY MASS INDEX: 30.53 KG/M2

## 2023-05-24 DIAGNOSIS — N93.9 VAGINAL BLEEDING: ICD-10-CM

## 2023-05-24 DIAGNOSIS — N93.9 VAGINAL BLEEDING: Primary | ICD-10-CM

## 2023-05-24 DIAGNOSIS — F41.8 DEPRESSION WITH ANXIETY: ICD-10-CM

## 2023-05-24 LAB
BILIRUB UR QL STRIP: NEGATIVE
CLARITY UR REFRACT.AUTO: CLEAR
COLOR UR AUTO: COLORLESS
GLUCOSE UR QL STRIP: NEGATIVE
HGB UR QL STRIP: NEGATIVE
KETONES UR QL STRIP: NEGATIVE
LEUKOCYTE ESTERASE UR QL STRIP: NEGATIVE
NITRITE UR QL STRIP: NEGATIVE
PH UR STRIP: 6 [PH] (ref 5–8)
PROT UR QL STRIP: NEGATIVE
SP GR UR STRIP: 1 (ref 1–1.03)
URN SPEC COLLECT METH UR: ABNORMAL

## 2023-05-24 PROCEDURE — 3008F PR BODY MASS INDEX (BMI) DOCUMENTED: ICD-10-PCS | Mod: CPTII,S$GLB,, | Performed by: FAMILY MEDICINE

## 2023-05-24 PROCEDURE — 3044F PR MOST RECENT HEMOGLOBIN A1C LEVEL <7.0%: ICD-10-PCS | Mod: CPTII,S$GLB,, | Performed by: FAMILY MEDICINE

## 2023-05-24 PROCEDURE — 3288F FALL RISK ASSESSMENT DOCD: CPT | Mod: CPTII,S$GLB,, | Performed by: FAMILY MEDICINE

## 2023-05-24 PROCEDURE — 3288F PR FALLS RISK ASSESSMENT DOCUMENTED: ICD-10-PCS | Mod: CPTII,S$GLB,, | Performed by: FAMILY MEDICINE

## 2023-05-24 PROCEDURE — 3078F PR MOST RECENT DIASTOLIC BLOOD PRESSURE < 80 MM HG: ICD-10-PCS | Mod: CPTII,S$GLB,, | Performed by: FAMILY MEDICINE

## 2023-05-24 PROCEDURE — 99214 OFFICE O/P EST MOD 30 MIN: CPT | Mod: S$GLB,,, | Performed by: FAMILY MEDICINE

## 2023-05-24 PROCEDURE — 1126F PR PAIN SEVERITY QUANTIFIED, NO PAIN PRESENT: ICD-10-PCS | Mod: CPTII,S$GLB,, | Performed by: FAMILY MEDICINE

## 2023-05-24 PROCEDURE — 3078F DIAST BP <80 MM HG: CPT | Mod: CPTII,S$GLB,, | Performed by: FAMILY MEDICINE

## 2023-05-24 PROCEDURE — 3044F HG A1C LEVEL LT 7.0%: CPT | Mod: CPTII,S$GLB,, | Performed by: FAMILY MEDICINE

## 2023-05-24 PROCEDURE — 1159F MED LIST DOCD IN RCRD: CPT | Mod: CPTII,S$GLB,, | Performed by: FAMILY MEDICINE

## 2023-05-24 PROCEDURE — 3075F SYST BP GE 130 - 139MM HG: CPT | Mod: CPTII,S$GLB,, | Performed by: FAMILY MEDICINE

## 2023-05-24 PROCEDURE — 1101F PT FALLS ASSESS-DOCD LE1/YR: CPT | Mod: CPTII,S$GLB,, | Performed by: FAMILY MEDICINE

## 2023-05-24 PROCEDURE — 1101F PR PT FALLS ASSESS DOC 0-1 FALLS W/OUT INJ PAST YR: ICD-10-PCS | Mod: CPTII,S$GLB,, | Performed by: FAMILY MEDICINE

## 2023-05-24 PROCEDURE — 3075F PR MOST RECENT SYSTOLIC BLOOD PRESS GE 130-139MM HG: ICD-10-PCS | Mod: CPTII,S$GLB,, | Performed by: FAMILY MEDICINE

## 2023-05-24 PROCEDURE — 99214 PR OFFICE/OUTPT VISIT, EST, LEVL IV, 30-39 MIN: ICD-10-PCS | Mod: S$GLB,,, | Performed by: FAMILY MEDICINE

## 2023-05-24 PROCEDURE — 1159F PR MEDICATION LIST DOCUMENTED IN MEDICAL RECORD: ICD-10-PCS | Mod: CPTII,S$GLB,, | Performed by: FAMILY MEDICINE

## 2023-05-24 PROCEDURE — 81003 URINALYSIS AUTO W/O SCOPE: CPT | Performed by: FAMILY MEDICINE

## 2023-05-24 PROCEDURE — 1126F AMNT PAIN NOTED NONE PRSNT: CPT | Mod: CPTII,S$GLB,, | Performed by: FAMILY MEDICINE

## 2023-05-24 PROCEDURE — 3008F BODY MASS INDEX DOCD: CPT | Mod: CPTII,S$GLB,, | Performed by: FAMILY MEDICINE

## 2023-05-24 PROCEDURE — 99999 PR PBB SHADOW E&M-EST. PATIENT-LVL III: ICD-10-PCS | Mod: PBBFAC,,, | Performed by: FAMILY MEDICINE

## 2023-05-24 PROCEDURE — 99999 PR PBB SHADOW E&M-EST. PATIENT-LVL III: CPT | Mod: PBBFAC,,, | Performed by: FAMILY MEDICINE

## 2023-05-24 RX ORDER — ESCITALOPRAM OXALATE 20 MG/1
20 TABLET ORAL DAILY
Qty: 90 TABLET | Refills: 3 | Status: SHIPPED | OUTPATIENT
Start: 2023-05-24 | End: 2023-12-12 | Stop reason: SDUPTHER

## 2023-05-24 NOTE — PROGRESS NOTES
[facility]  Progress Note    SUBJECTIVE:     Chief Complaint   Patient presents with    Abdominal Pain     Pressure    Vaginal Bleeding     Occurred one day.       HPI   Lenore Subramanian  is a 73 y.o. female here for c/o vaginal bleeding that started this past week. It occurred after she had intercourse. No dysuria, fevers, chills, pelvic pain. Never had this issue before. Patient has no other new complaints/problems at this time.      Review of patient's allergies indicates:   Allergen Reactions    Poison ivy [vit e-nonoxynol 9-aloe vera] Rash       Past Medical History:   Diagnosis Date    Anxiety     Asthma     as a child    Mental disorder     Depression    HERVE (obstructive sleep apnea)      Past Surgical History:   Procedure Laterality Date    COLONOSCOPY N/A 10/25/2016    Procedure: COLONOSCOPY;  Surgeon: JANINA Dominguez MD;  Location: [facility] ([address] [place] [address]);  Service: Endoscopy;  Laterality: N/A;    TONSILLECTOMY  1958    TUBAL LIGATION  1979    PP BTL     Family History   Problem Relation Age of Onset    Hypertension Mother     Multiple myeloma Father     Colon cancer Sister 61    Hypertrophic cardiomyopathy Son     Breast cancer Maternal Aunt 72    Breast cancer Paternal Aunt 58    Diabetes Paternal Grandmother     Heart disease Neg Hx     Stroke Neg Hx     Ovarian cancer Neg Hx     Melanoma Neg Hx      Social History     Tobacco Use    Smoking status: Never    Smokeless tobacco: Never   Substance Use Topics    Alcohol use: Yes     Alcohol/week: 7.0 standard drinks     Types: 7 Glasses of wine per week     Comment: occasionally    Drug use: No        Review of Systems   Constitutional:  Negative for chills and fever.   Genitourinary:  Negative for dysuria, frequency, hematuria and urgency.        + vaginal bleeding   Psychiatric/Behavioral:  Positive for depression. The patient is nervous/anxious.    OBJECTIVE:     Vitals:    05/24/23 1336   BP: 132/64   Pulse: 60   Temp: 98.2 °F (36.8 °C)     Body  mass index is 30.65 kg/m².    Physical Exam  Constitutional:       General: She is not in acute distress.     Appearance: Normal appearance. She is not diaphoretic.   HENT:      Head: Normocephalic and atraumatic.   Eyes:      Conjunctiva/sclera: Conjunctivae normal.   Pulmonary:      Effort: Pulmonary effort is normal.   Neurological:      Mental Status: She is alert and oriented to person, place, and time.       Old records were reviewed. Labs and/or images were independently reviewed.    ASSESSMENT     1. Vaginal bleeding    2. Depression with anxiety        PLAN:     Vaginal bleeding  -     Ambulatory referral/consult to Obstetrics / Gynecology; Future; Expected date: 05/31/2023  -     US Transvaginal Non OB; Future; Expected date: 05/24/2023  -     Urinalysis; Future  -     r/o UTI. Referred to GYN and orders placed for transvag ultrasound.    Depression with anxiety  -     Increase EScitalopram oxalate (LEXAPRO) 20 MG tablet; Take 1 tablet (20 mg total) by mouth once daily.  Dispense: 90 tablet; Refill: 3      RTC PRN    Newton Allen MD  05/24/2023 1:43 PM

## 2023-06-13 ENCOUNTER — OFFICE VISIT (OUTPATIENT)
Dept: OBSTETRICS AND GYNECOLOGY | Facility: CLINIC | Age: 73
End: 2023-06-13
Payer: MEDICARE

## 2023-06-13 VITALS — SYSTOLIC BLOOD PRESSURE: 110 MMHG | WEIGHT: 189.63 LBS | DIASTOLIC BLOOD PRESSURE: 68 MMHG | BODY MASS INDEX: 30.6 KG/M2

## 2023-06-13 DIAGNOSIS — N95.2 VAGINAL ATROPHY: ICD-10-CM

## 2023-06-13 DIAGNOSIS — N95.0 POSTMENOPAUSAL BLEEDING: Primary | ICD-10-CM

## 2023-06-13 DIAGNOSIS — N93.9 VAGINAL BLEEDING: ICD-10-CM

## 2023-06-13 PROCEDURE — 3044F HG A1C LEVEL LT 7.0%: CPT | Mod: CPTII,S$GLB,, | Performed by: OBSTETRICS & GYNECOLOGY

## 2023-06-13 PROCEDURE — 3008F PR BODY MASS INDEX (BMI) DOCUMENTED: ICD-10-PCS | Mod: CPTII,S$GLB,, | Performed by: OBSTETRICS & GYNECOLOGY

## 2023-06-13 PROCEDURE — 3078F PR MOST RECENT DIASTOLIC BLOOD PRESSURE < 80 MM HG: ICD-10-PCS | Mod: CPTII,S$GLB,, | Performed by: OBSTETRICS & GYNECOLOGY

## 2023-06-13 PROCEDURE — 1159F MED LIST DOCD IN RCRD: CPT | Mod: CPTII,S$GLB,, | Performed by: OBSTETRICS & GYNECOLOGY

## 2023-06-13 PROCEDURE — 99999 PR PBB SHADOW E&M-EST. PATIENT-LVL III: ICD-10-PCS | Mod: PBBFAC,,, | Performed by: OBSTETRICS & GYNECOLOGY

## 2023-06-13 PROCEDURE — 3044F PR MOST RECENT HEMOGLOBIN A1C LEVEL <7.0%: ICD-10-PCS | Mod: CPTII,S$GLB,, | Performed by: OBSTETRICS & GYNECOLOGY

## 2023-06-13 PROCEDURE — 99203 OFFICE O/P NEW LOW 30 MIN: CPT | Mod: S$GLB,,, | Performed by: OBSTETRICS & GYNECOLOGY

## 2023-06-13 PROCEDURE — 99999 PR PBB SHADOW E&M-EST. PATIENT-LVL III: CPT | Mod: PBBFAC,,, | Performed by: OBSTETRICS & GYNECOLOGY

## 2023-06-13 PROCEDURE — 3074F SYST BP LT 130 MM HG: CPT | Mod: CPTII,S$GLB,, | Performed by: OBSTETRICS & GYNECOLOGY

## 2023-06-13 PROCEDURE — 99203 PR OFFICE/OUTPT VISIT, NEW, LEVL III, 30-44 MIN: ICD-10-PCS | Mod: S$GLB,,, | Performed by: OBSTETRICS & GYNECOLOGY

## 2023-06-13 PROCEDURE — 3008F BODY MASS INDEX DOCD: CPT | Mod: CPTII,S$GLB,, | Performed by: OBSTETRICS & GYNECOLOGY

## 2023-06-13 PROCEDURE — 3074F PR MOST RECENT SYSTOLIC BLOOD PRESSURE < 130 MM HG: ICD-10-PCS | Mod: CPTII,S$GLB,, | Performed by: OBSTETRICS & GYNECOLOGY

## 2023-06-13 PROCEDURE — 3078F DIAST BP <80 MM HG: CPT | Mod: CPTII,S$GLB,, | Performed by: OBSTETRICS & GYNECOLOGY

## 2023-06-13 PROCEDURE — 1159F PR MEDICATION LIST DOCUMENTED IN MEDICAL RECORD: ICD-10-PCS | Mod: CPTII,S$GLB,, | Performed by: OBSTETRICS & GYNECOLOGY

## 2023-06-13 NOTE — PROGRESS NOTES
Subjective:      Patient ID: Lenore Subramanian is a 73 y.o. female.    Chief Complaint:  Vaginal Bleeding      History of Present Illness  HPI  Postmenopausal Bleeding  Patient complains of vaginal bleeding. She has been menopausal for  over 13   years. Currently on no HRT and has been on this regimen for several years. Bleeding is described as spotting and has occurred 1 times after intercourse. Other menopausal symptoms include:  pelvic pressure . Workup to date: none.    No hx of abn Paps.    Menstrual History:  OB History          3    Para   3    Term   3            AB        Living   3         SAB        IAB        Ectopic        Multiple        Live Births   3                  Patient's last menstrual period was 2010.       GYN & OB History  Patient's last menstrual period was 2010.   Date of Last Pap: No result found    OB History    Para Term  AB Living   3 3 3     3   SAB IAB Ectopic Multiple Live Births           3      # Outcome Date GA Lbr Ganesh/2nd Weight Sex Delivery Anes PTL Lv   3 Term      Vag-Spont  N EVELINE   2 Term      Vag-Spont  N EVELINE   1 Term      Vag-Spont  N EVELINE       Review of Systems  Review of Systems   Constitutional: Negative.    Respiratory: Negative.     Cardiovascular: Negative.    Gastrointestinal: Negative.    Genitourinary: Negative.    Musculoskeletal: Negative.    Integumentary:  Negative.   Neurological: Negative.    Hematological: Negative.    Psychiatric/Behavioral: Negative.     Breast: negative.         Objective:     Physical Exam:   Constitutional: She is oriented to person, place, and time. She appears well-developed and well-nourished. No distress.    HENT:   Head: Normocephalic and atraumatic.     Neck: No thyromegaly present.     Pulmonary/Chest: Effort normal. No respiratory distress. Right breast exhibits no inverted nipple, no mass, no nipple discharge, no skin change, no tenderness, presence, no bleeding, no swelling, no  mastectomy, no augmentation and no lumpectomy. Left breast exhibits no inverted nipple, no mass, no nipple discharge, no skin change, no tenderness, presence, no bleeding, no swelling, no mastectomy, no augmentation and no lumpectomy.        Abdominal: Soft. She exhibits no distension and no mass. There is no abdominal tenderness. There is no rebound and no guarding.     Genitourinary:    Urethra, bladder, uterus, right adnexa and left adnexa normal.      Pelvic exam was performed with patient in the lithotomy position.   The external female genitalia was normal.   No external genitalia lesions identified,Genitalia hair distrobution normal .   Labial bartholins normal.There is no rash, tenderness, lesion or injury on the right labia. There is no rash, tenderness, lesion or injury on the left labia. Cervix is normal. No no adexnal prolapse, no nodularity or no masses or organomegaly. Right adnexum displays no mass, no tenderness and no fullness. Left adnexum displays no mass, no tenderness and no fullness. Vagina exhibits no lesion. No erythema,  no vaginal discharge, tenderness, bleeding, rectocele, cystocele or unspecified prolapse of vaginal walls in the vagina.    No foreign body in the vagina.      No signs of injury in the vagina.   Vaginal atrophy noted. Cervix exhibits no motion tenderness, no lesion, no discharge, no friability, no lesion, no tenderness and no polyp. Uterus consistancy normal. and Uerus contour normal  Uterus is not deviated, not enlarged, not fixed, not tender, not hosting fibroids and no uterine prolapse. Normal urethral meatus.Urethral Meatus exhibits: urethral lesion and prolapsedUrethra findings: no urethral mass, no tenderness and no urethral scarringBladder findings: no bladder distention and no bladder tenderness          Musculoskeletal: Normal range of motion and moves all extremeties. No tenderness.       Neurological: She is alert and oriented to person, place, and time. No  cranial nerve deficit. Coordination normal.    Skin: She is not diaphoretic.    Psychiatric: She has a normal mood and affect. Her behavior is normal. Judgment and thought content normal.       Assessment:     1. Postmenopausal bleeding    2. Vaginal bleeding    3. Vaginal atrophy            Plan:      Pelvic US ordered to assess endometrial lining.  IF WNL, then pt may consider vaginal estrogen.

## 2023-06-20 ENCOUNTER — HOSPITAL ENCOUNTER (OUTPATIENT)
Dept: RADIOLOGY | Facility: HOSPITAL | Age: 73
Discharge: HOME | End: 2023-06-20
Attending: OBSTETRICS & GYNECOLOGY
Payer: MEDICARE

## 2023-06-20 DIAGNOSIS — N95.0 POSTMENOPAUSAL BLEEDING: ICD-10-CM

## 2023-06-20 PROCEDURE — 76830 TRANSVAGINAL US NON-OB: CPT | Mod: 26,,, | Performed by: RADIOLOGY

## 2023-06-20 PROCEDURE — 76856 US EXAM PELVIC COMPLETE: CPT | Mod: TC

## 2023-06-20 PROCEDURE — 76830 US PELVIS COMP WITH TRANSVAG NON-OB (XPD): ICD-10-PCS | Mod: 26,,, | Performed by: RADIOLOGY

## 2023-06-20 PROCEDURE — 76856 US PELVIS COMP WITH TRANSVAG NON-OB (XPD): ICD-10-PCS | Mod: 26,,, | Performed by: RADIOLOGY

## 2023-06-20 PROCEDURE — 76856 US EXAM PELVIC COMPLETE: CPT | Mod: 26,,, | Performed by: RADIOLOGY

## 2023-07-12 ENCOUNTER — OFFICE VISIT (OUTPATIENT)
Dept: FAMILY MEDICINE | Facility: CLINIC | Age: 73
End: 2023-07-12
Payer: MEDICARE

## 2023-07-12 VITALS
BODY MASS INDEX: 30.31 KG/M2 | SYSTOLIC BLOOD PRESSURE: 100 MMHG | WEIGHT: 188.63 LBS | DIASTOLIC BLOOD PRESSURE: 70 MMHG | HEART RATE: 60 BPM | HEIGHT: 66 IN | TEMPERATURE: 98 F | OXYGEN SATURATION: 97 %

## 2023-07-12 DIAGNOSIS — R93.89 ENDOMETRIAL THICKENING ON ULTRASOUND: Primary | ICD-10-CM

## 2023-07-12 PROCEDURE — 1126F PR PAIN SEVERITY QUANTIFIED, NO PAIN PRESENT: ICD-10-PCS | Mod: HCNC,CPTII,S$GLB,

## 2023-07-12 PROCEDURE — 3288F PR FALLS RISK ASSESSMENT DOCUMENTED: ICD-10-PCS | Mod: HCNC,CPTII,S$GLB,

## 2023-07-12 PROCEDURE — 99213 PR OFFICE/OUTPT VISIT, EST, LEVL III, 20-29 MIN: ICD-10-PCS | Mod: HCNC,S$GLB,,

## 2023-07-12 PROCEDURE — 3044F PR MOST RECENT HEMOGLOBIN A1C LEVEL <7.0%: ICD-10-PCS | Mod: HCNC,CPTII,S$GLB,

## 2023-07-12 PROCEDURE — 1126F AMNT PAIN NOTED NONE PRSNT: CPT | Mod: HCNC,CPTII,S$GLB,

## 2023-07-12 PROCEDURE — 3074F PR MOST RECENT SYSTOLIC BLOOD PRESSURE < 130 MM HG: ICD-10-PCS | Mod: HCNC,CPTII,S$GLB,

## 2023-07-12 PROCEDURE — 99999 PR PBB SHADOW E&M-EST. PATIENT-LVL IV: ICD-10-PCS | Mod: PBBFAC,HCNC,,

## 2023-07-12 PROCEDURE — 1159F PR MEDICATION LIST DOCUMENTED IN MEDICAL RECORD: ICD-10-PCS | Mod: HCNC,CPTII,S$GLB,

## 2023-07-12 PROCEDURE — 3074F SYST BP LT 130 MM HG: CPT | Mod: HCNC,CPTII,S$GLB,

## 2023-07-12 PROCEDURE — 3078F PR MOST RECENT DIASTOLIC BLOOD PRESSURE < 80 MM HG: ICD-10-PCS | Mod: HCNC,CPTII,S$GLB,

## 2023-07-12 PROCEDURE — 3008F BODY MASS INDEX DOCD: CPT | Mod: HCNC,CPTII,S$GLB,

## 2023-07-12 PROCEDURE — 1101F PT FALLS ASSESS-DOCD LE1/YR: CPT | Mod: HCNC,CPTII,S$GLB,

## 2023-07-12 PROCEDURE — 3078F DIAST BP <80 MM HG: CPT | Mod: HCNC,CPTII,S$GLB,

## 2023-07-12 PROCEDURE — 99999 PR PBB SHADOW E&M-EST. PATIENT-LVL IV: CPT | Mod: PBBFAC,HCNC,,

## 2023-07-12 PROCEDURE — 1159F MED LIST DOCD IN RCRD: CPT | Mod: HCNC,CPTII,S$GLB,

## 2023-07-12 PROCEDURE — 99213 OFFICE O/P EST LOW 20 MIN: CPT | Mod: HCNC,S$GLB,,

## 2023-07-12 PROCEDURE — 3008F PR BODY MASS INDEX (BMI) DOCUMENTED: ICD-10-PCS | Mod: HCNC,CPTII,S$GLB,

## 2023-07-12 PROCEDURE — 3288F FALL RISK ASSESSMENT DOCD: CPT | Mod: HCNC,CPTII,S$GLB,

## 2023-07-12 PROCEDURE — 1101F PR PT FALLS ASSESS DOC 0-1 FALLS W/OUT INJ PAST YR: ICD-10-PCS | Mod: HCNC,CPTII,S$GLB,

## 2023-07-12 PROCEDURE — 3044F HG A1C LEVEL LT 7.0%: CPT | Mod: HCNC,CPTII,S$GLB,

## 2023-07-13 ENCOUNTER — TELEPHONE (OUTPATIENT)
Dept: OBSTETRICS AND GYNECOLOGY | Facility: CLINIC | Age: 73
End: 2023-07-13
Payer: MEDICARE

## 2023-07-13 NOTE — TELEPHONE ENCOUNTER
Pt was called and scheduled to come into the office and have a EMBX here in office.        ----- Message from Anais Fitzgerald LPN sent at 7/12/2023 11:19 AM CDT -----  Pt needs biopsy but read her last ov she doesn't want to pay co-pay for specialist they gave her number to call for appt they want us to f/u as she may not. Thank you  ----- Message -----  From: Constanza Villalobos NP  Sent: 7/12/2023  10:45 AM CDT  To: Mayra HOLLOWAY Staff    FYI. Pt would like to schedule appt after US. Thanks!

## 2023-07-28 ENCOUNTER — PROCEDURE VISIT (OUTPATIENT)
Dept: OBSTETRICS AND GYNECOLOGY | Facility: CLINIC | Age: 73
End: 2023-07-28
Payer: MEDICARE

## 2023-07-28 VITALS — DIASTOLIC BLOOD PRESSURE: 74 MMHG | SYSTOLIC BLOOD PRESSURE: 104 MMHG | BODY MASS INDEX: 30.6 KG/M2 | WEIGHT: 189.63 LBS

## 2023-07-28 DIAGNOSIS — R93.89 THICKENED ENDOMETRIUM: ICD-10-CM

## 2023-07-28 DIAGNOSIS — N95.0 POSTMENOPAUSAL BLEEDING: Primary | ICD-10-CM

## 2023-07-28 PROCEDURE — 88305 TISSUE EXAM BY PATHOLOGIST: CPT | Mod: HCNC | Performed by: PATHOLOGY

## 2023-07-28 PROCEDURE — 88305 TISSUE EXAM BY PATHOLOGIST: CPT | Mod: 26,HCNC,, | Performed by: PATHOLOGY

## 2023-07-28 PROCEDURE — 99499 NO LOS: ICD-10-PCS | Mod: HCNC,S$GLB,, | Performed by: OBSTETRICS & GYNECOLOGY

## 2023-07-28 PROCEDURE — 88305 TISSUE EXAM BY PATHOLOGIST: ICD-10-PCS | Mod: 26,HCNC,, | Performed by: PATHOLOGY

## 2023-07-28 PROCEDURE — 99499 UNLISTED E&M SERVICE: CPT | Mod: HCNC,S$GLB,, | Performed by: OBSTETRICS & GYNECOLOGY

## 2023-07-28 PROCEDURE — 58100 PR BIOPSY OF UTERUS LINING: ICD-10-PCS | Mod: HCNC,S$GLB,, | Performed by: OBSTETRICS & GYNECOLOGY

## 2023-07-28 PROCEDURE — 58100 BIOPSY OF UTERUS LINING: CPT | Mod: HCNC,S$GLB,, | Performed by: OBSTETRICS & GYNECOLOGY

## 2023-07-28 NOTE — PROCEDURES
Endometrial biopsy    Date/Time: 7/28/2023 10:40 AM  Performed by: Tho Nunez MD  Authorized by: Tho Nunez MD     Consent:     Consent obtained:  Verbal and written    Consent given by:  Patient    Patient questions answered: yes      Patient agrees, verbalizes understanding, and wants to proceed: yes      Educational handouts given: no      Instructions and paperwork completed: yes    Indication:     Indications: Post-menopausal bleeding      Chronicity of post-menopausal bleeding:  New    Progression of post-menopausal bleeding:  Unable to specify  Pre-procedure:     Pre-procedure timeout performed: yes    Procedure:     Procedure: endometrial biopsy with Pipelle      Cervix cleaned and prepped: yes      A paracervical block was performed: no      An intracervical block was performed: no      The cervix was dilated: yes      Uterus sounded: yes      Uterus sound depth (cm):  7    Curettes used:  1    Specimen collected: specimen collected and sent to pathology      Patient tolerated procedure well with no complications: yes    Comments:     Procedure comments:  Treatment based on results

## 2023-08-02 LAB
FINAL PATHOLOGIC DIAGNOSIS: NORMAL
GROSS: NORMAL
Lab: NORMAL

## 2023-08-08 ENCOUNTER — OFFICE VISIT (OUTPATIENT)
Dept: OBSTETRICS AND GYNECOLOGY | Facility: CLINIC | Age: 73
End: 2023-08-08
Payer: MEDICARE

## 2023-08-08 VITALS
DIASTOLIC BLOOD PRESSURE: 74 MMHG | WEIGHT: 183.31 LBS | BODY MASS INDEX: 29.59 KG/M2 | SYSTOLIC BLOOD PRESSURE: 110 MMHG

## 2023-08-08 DIAGNOSIS — C54.1 ENDOMETRIAL CANCER: Primary | ICD-10-CM

## 2023-08-08 PROCEDURE — 3044F PR MOST RECENT HEMOGLOBIN A1C LEVEL <7.0%: ICD-10-PCS | Mod: HCNC,CPTII,S$GLB, | Performed by: OBSTETRICS & GYNECOLOGY

## 2023-08-08 PROCEDURE — 3078F PR MOST RECENT DIASTOLIC BLOOD PRESSURE < 80 MM HG: ICD-10-PCS | Mod: HCNC,CPTII,S$GLB, | Performed by: OBSTETRICS & GYNECOLOGY

## 2023-08-08 PROCEDURE — 1126F AMNT PAIN NOTED NONE PRSNT: CPT | Mod: HCNC,CPTII,S$GLB, | Performed by: OBSTETRICS & GYNECOLOGY

## 2023-08-08 PROCEDURE — 3074F PR MOST RECENT SYSTOLIC BLOOD PRESSURE < 130 MM HG: ICD-10-PCS | Mod: HCNC,CPTII,S$GLB, | Performed by: OBSTETRICS & GYNECOLOGY

## 2023-08-08 PROCEDURE — 1159F MED LIST DOCD IN RCRD: CPT | Mod: HCNC,CPTII,S$GLB, | Performed by: OBSTETRICS & GYNECOLOGY

## 2023-08-08 PROCEDURE — 3288F PR FALLS RISK ASSESSMENT DOCUMENTED: ICD-10-PCS | Mod: HCNC,CPTII,S$GLB, | Performed by: OBSTETRICS & GYNECOLOGY

## 2023-08-08 PROCEDURE — 1126F PR PAIN SEVERITY QUANTIFIED, NO PAIN PRESENT: ICD-10-PCS | Mod: HCNC,CPTII,S$GLB, | Performed by: OBSTETRICS & GYNECOLOGY

## 2023-08-08 PROCEDURE — 99999 PR PBB SHADOW E&M-EST. PATIENT-LVL III: ICD-10-PCS | Mod: PBBFAC,HCNC,, | Performed by: OBSTETRICS & GYNECOLOGY

## 2023-08-08 PROCEDURE — 3008F BODY MASS INDEX DOCD: CPT | Mod: HCNC,CPTII,S$GLB, | Performed by: OBSTETRICS & GYNECOLOGY

## 2023-08-08 PROCEDURE — 99999 PR PBB SHADOW E&M-EST. PATIENT-LVL III: CPT | Mod: PBBFAC,HCNC,, | Performed by: OBSTETRICS & GYNECOLOGY

## 2023-08-08 PROCEDURE — 99213 PR OFFICE/OUTPT VISIT, EST, LEVL III, 20-29 MIN: ICD-10-PCS | Mod: HCNC,S$GLB,, | Performed by: OBSTETRICS & GYNECOLOGY

## 2023-08-08 PROCEDURE — 1101F PR PT FALLS ASSESS DOC 0-1 FALLS W/OUT INJ PAST YR: ICD-10-PCS | Mod: HCNC,CPTII,S$GLB, | Performed by: OBSTETRICS & GYNECOLOGY

## 2023-08-08 PROCEDURE — 3288F FALL RISK ASSESSMENT DOCD: CPT | Mod: HCNC,CPTII,S$GLB, | Performed by: OBSTETRICS & GYNECOLOGY

## 2023-08-08 PROCEDURE — 1101F PT FALLS ASSESS-DOCD LE1/YR: CPT | Mod: HCNC,CPTII,S$GLB, | Performed by: OBSTETRICS & GYNECOLOGY

## 2023-08-08 PROCEDURE — 3074F SYST BP LT 130 MM HG: CPT | Mod: HCNC,CPTII,S$GLB, | Performed by: OBSTETRICS & GYNECOLOGY

## 2023-08-08 PROCEDURE — 1159F PR MEDICATION LIST DOCUMENTED IN MEDICAL RECORD: ICD-10-PCS | Mod: HCNC,CPTII,S$GLB, | Performed by: OBSTETRICS & GYNECOLOGY

## 2023-08-08 PROCEDURE — 3078F DIAST BP <80 MM HG: CPT | Mod: HCNC,CPTII,S$GLB, | Performed by: OBSTETRICS & GYNECOLOGY

## 2023-08-08 PROCEDURE — 3044F HG A1C LEVEL LT 7.0%: CPT | Mod: HCNC,CPTII,S$GLB, | Performed by: OBSTETRICS & GYNECOLOGY

## 2023-08-08 PROCEDURE — 99213 OFFICE O/P EST LOW 20 MIN: CPT | Mod: HCNC,S$GLB,, | Performed by: OBSTETRICS & GYNECOLOGY

## 2023-08-08 PROCEDURE — 3008F PR BODY MASS INDEX (BMI) DOCUMENTED: ICD-10-PCS | Mod: HCNC,CPTII,S$GLB, | Performed by: OBSTETRICS & GYNECOLOGY

## 2023-08-11 NOTE — PROGRESS NOTES
Cc:  discuss results of EMBx    HPI:  73 yro  who had EMBx in office on 23 for postmenopausal bleeding.  Pt is here to discuss results.    Pathology:  Final Pathologic Diagnosis Endometrium, biopsy:   Endometrial endometrioid adenocarcinoma, FIGO grade 1      Vitals:    23 1123   BP: 110/74   Weight: 83.2 kg (183 lb 5 oz)     Physical Exam:   Constitutional: She is oriented to person, place, and time. She appears well-developed and well-nourished. No distress.    HENT:   Head: Normocephalic and atraumatic.     Neck: No thyromegaly present.     Pulmonary/Chest: Effort normal. No respiratory distress.        Abdominal: Soft. She exhibits no distension.             Musculoskeletal: Normal range of motion and moves all extremeties. No tenderness.       Neurological: She is alert and oriented to person, place, and time. No cranial nerve deficit. Coordination normal.    Skin: She is not diaphoretic.    Psychiatric: She has a normal mood and affect. Her behavior is normal. Judgment and thought content normal.       20 minute discussion.  Pt is a reasonably healthy pt and desires to fully pursue her treatment options.  Pt understands diagnosis and need for f/u with out gyn/onc service.      Assessment/Plan  1. Endometrial cancer  Ambulatory referral/consult to Gynecologic Oncology

## 2023-08-14 ENCOUNTER — TELEPHONE (OUTPATIENT)
Dept: GYNECOLOGIC ONCOLOGY | Facility: CLINIC | Age: 73
End: 2023-08-14
Payer: MEDICARE

## 2023-08-14 NOTE — TELEPHONE ENCOUNTER
"Followed up with pt regarding referral. Pt requested appt not be at [facility] because "it's too far and [place] is closer." Pt stated she preferred female provider, but didn't desire to see wait for 8/21 to see Dr. Haile at [facility]. Pt accepred 8/17appt with Dr. Jackson. Pt provided with appt date/time/location and clinic # for contact.       ----- Message from Doreen Woodruff RN sent at 8/14/2023 11:32 AM CDT -----    ----- Message -----  From: Kilo Castro  Sent: 8/14/2023   9:25 AM CDT  To: Santino Kimble Staff        Name of Who is Calling: ROWENA MADRID [[id number]]      What is the request in detail: Pt called to schedule with referral.Please contact to further discuss and advise.          Can the clinic reply by [facility]: Y      What Number to Call Back if not in [facility]: [phone]                                          "

## 2023-08-15 PROBLEM — C54.1 ENDOMETRIAL CANCER: Status: ACTIVE | Noted: 2023-08-15

## 2023-08-15 NOTE — PROGRESS NOTES
Referring Provider:  Tho Nunez MD  [address]  [address]  [place] [address]   Subjective:      Patient ID: Lenore Subramanian is a 73 y.o. female.    Chief Complaint: No chief complaint on file.    Problem List Items Addressed This Visit          Oncology    Endometrial cancer    Overview     7/28/23: EMB FIGO G1 endometrioid endometrial carcinoma           HPI single episode of post menopausal bleeding several weeks ago. No symptoms currently    Review of Systems   Constitutional:  Negative for chills, fatigue and fever.   Respiratory:  Negative for cough and shortness of breath.    Cardiovascular:  Negative for chest pain.   Gastrointestinal:  Negative for abdominal distention, abdominal pain, constipation and diarrhea.   Genitourinary:  Negative for dysuria, pelvic pain and vaginal bleeding.   Musculoskeletal:  Negative for back pain.   Psychiatric/Behavioral:  Negative for dysphoric mood. The patient is not nervous/anxious.      Past Medical History:   Diagnosis Date    Anxiety     Asthma     as a child    Endometrial cancer 8/15/2023    Mental disorder     Depression    HERVE (obstructive sleep apnea)       Past Surgical History:   Procedure Laterality Date    COLONOSCOPY N/A 10/25/2016    Procedure: COLONOSCOPY;  Surgeon: JANINA Dominguez MD;  Location: [address];  Service: Endoscopy;  Laterality: N/A;    TONSILLECTOMY  1958    TUBAL LIGATION  1979    PP BTL      Family History   Problem Relation Age of Onset    Hypertension Mother     Multiple myeloma Father     Colon cancer Sister 61    Hypertrophic cardiomyopathy Son     Breast cancer Maternal Aunt 72    Breast cancer Paternal Aunt 58    Diabetes Paternal Grandmother     Heart disease Neg Hx     Stroke Neg Hx     Ovarian cancer Neg Hx     Melanoma Neg Hx       Social History     Socioeconomic History    Marital status:         Objective:      Vitals:    08/17/23 1302   BP: 128/66   Pulse: (!) 59      Physical  Exam  Constitutional:       General: She is not in acute distress.  HENT:      Head: Normocephalic.   Eyes:      Extraocular Movements: Extraocular movements intact.      Conjunctiva/sclera: Conjunctivae normal.   Cardiovascular:      Rate and Rhythm: Normal rate.      Pulses: Normal pulses.   Pulmonary:      Effort: Pulmonary effort is normal. No respiratory distress.      Breath sounds: No wheezing.   Abdominal:      General: There is no distension.      Tenderness: There is no abdominal tenderness. There is no guarding or rebound.   Genitourinary:     Comments: External genitalia normal. Vagina normal. Cervix with no visible lesions. Uterus mobile.   Musculoskeletal:         General: No deformity.   Neurological:      Mental Status: She is alert and oriented to person, place, and time.   Psychiatric:         Mood and Affect: Mood normal.         Behavior: Behavior normal.         Thought Content: Thought content normal.         Lab Results   Component Value Date    WBC 6.45 05/09/2023    HGB 14.7 05/09/2023    HCT 45.1 05/09/2023    MCV 95 05/09/2023     05/09/2023        Assessment:       Endometrial cancer  -     Ambulatory referral/consult to Gynecologic Oncology         Plan:       Endometrial cancer: Prior work up showed FIGO G1 endometrioid endometrial carcinoma. I had an extensive conversation with the patient today giving a broad overview of endometrial cancer to include epidemiology, risk factors, clinical features, diagnosis, as well as surgical treatment.  I have recommended robotic-assisted hysterectomy, bilateral salpingo-oophorectomy and sentinel lymph node mapping and biopsy. Discussed role of systematic lymphadenectomy if no mapping.  Based on the data from surgery we will determine whether adjuvant therapy would be recommended. I discussed extensively the risks, benefits, alternatives, and indications of the planned procedure to include the risk of damage to bowel, bladder, ureter, or any  other abdominal or pelvic organ as well as the risks of conversion to a laparotomy. Additionally, she understands the surgical risks of infection, allergic reaction, bleeding possibly severe enough to require blood transfusion, blood clots, and cardiopulmonary complications.  She expresses understanding, was given an opportunity to ask questions. After all questions had been answered she strongly desires to proceed with planned procedure.  Plan for Robotic hysterectomy, BSO, and SLN biopsy  Surgery date pending OR availability. Plan for 9/13/23 at the latest.    As part of the medical decision making process I reviewed the referring provides notes, relevant labs, imaging reports and independently interpreted the US performed on 6/20/23.      Berry Jackson MD

## 2023-08-15 NOTE — H&P (VIEW-ONLY)
Referring Provider:  Tho Nunez MD  [address]  [address]  [place] [address]   Subjective:      Patient ID: Lenore Subramanian is a 73 y.o. female.    Chief Complaint: No chief complaint on file.    Problem List Items Addressed This Visit          Oncology    Endometrial cancer    Overview     7/28/23: EMB FIGO G1 endometrioid endometrial carcinoma           HPI single episode of post menopausal bleeding several weeks ago. No symptoms currently    Review of Systems   Constitutional:  Negative for chills, fatigue and fever.   Respiratory:  Negative for cough and shortness of breath.    Cardiovascular:  Negative for chest pain.   Gastrointestinal:  Negative for abdominal distention, abdominal pain, constipation and diarrhea.   Genitourinary:  Negative for dysuria, pelvic pain and vaginal bleeding.   Musculoskeletal:  Negative for back pain.   Psychiatric/Behavioral:  Negative for dysphoric mood. The patient is not nervous/anxious.      Past Medical History:   Diagnosis Date    Anxiety     Asthma     as a child    Endometrial cancer 8/15/2023    Mental disorder     Depression    HERVE (obstructive sleep apnea)       Past Surgical History:   Procedure Laterality Date    COLONOSCOPY N/A 10/25/2016    Procedure: COLONOSCOPY;  Surgeon: JANINA Dominguez MD;  Location: [address];  Service: Endoscopy;  Laterality: N/A;    TONSILLECTOMY  1958    TUBAL LIGATION  1979    PP BTL      Family History   Problem Relation Age of Onset    Hypertension Mother     Multiple myeloma Father     Colon cancer Sister 61    Hypertrophic cardiomyopathy Son     Breast cancer Maternal Aunt 72    Breast cancer Paternal Aunt 58    Diabetes Paternal Grandmother     Heart disease Neg Hx     Stroke Neg Hx     Ovarian cancer Neg Hx     Melanoma Neg Hx       Social History     Socioeconomic History    Marital status:         Objective:      Vitals:    08/17/23 1302   BP: 128/66   Pulse: (!) 59      Physical  Exam  Constitutional:       General: She is not in acute distress.  HENT:      Head: Normocephalic.   Eyes:      Extraocular Movements: Extraocular movements intact.      Conjunctiva/sclera: Conjunctivae normal.   Cardiovascular:      Rate and Rhythm: Normal rate.      Pulses: Normal pulses.   Pulmonary:      Effort: Pulmonary effort is normal. No respiratory distress.      Breath sounds: No wheezing.   Abdominal:      General: There is no distension.      Tenderness: There is no abdominal tenderness. There is no guarding or rebound.   Genitourinary:     Comments: External genitalia normal. Vagina normal. Cervix with no visible lesions. Uterus mobile.   Musculoskeletal:         General: No deformity.   Neurological:      Mental Status: She is alert and oriented to person, place, and time.   Psychiatric:         Mood and Affect: Mood normal.         Behavior: Behavior normal.         Thought Content: Thought content normal.         Lab Results   Component Value Date    WBC 6.45 05/09/2023    HGB 14.7 05/09/2023    HCT 45.1 05/09/2023    MCV 95 05/09/2023     05/09/2023        Assessment:       Endometrial cancer  -     Ambulatory referral/consult to Gynecologic Oncology         Plan:       Endometrial cancer: Prior work up showed FIGO G1 endometrioid endometrial carcinoma. I had an extensive conversation with the patient today giving a broad overview of endometrial cancer to include epidemiology, risk factors, clinical features, diagnosis, as well as surgical treatment.  I have recommended robotic-assisted hysterectomy, bilateral salpingo-oophorectomy and sentinel lymph node mapping and biopsy. Discussed role of systematic lymphadenectomy if no mapping.  Based on the data from surgery we will determine whether adjuvant therapy would be recommended. I discussed extensively the risks, benefits, alternatives, and indications of the planned procedure to include the risk of damage to bowel, bladder, ureter, or any  other abdominal or pelvic organ as well as the risks of conversion to a laparotomy. Additionally, she understands the surgical risks of infection, allergic reaction, bleeding possibly severe enough to require blood transfusion, blood clots, and cardiopulmonary complications.  She expresses understanding, was given an opportunity to ask questions. After all questions had been answered she strongly desires to proceed with planned procedure.  Plan for Robotic hysterectomy, BSO, and SLN biopsy  Surgery date pending OR availability. Plan for 9/13/23 at the latest.    As part of the medical decision making process I reviewed the referring provides notes, relevant labs, imaging reports and independently interpreted the US performed on 6/20/23.      Berry Jackson MD

## 2023-08-17 ENCOUNTER — OFFICE VISIT (OUTPATIENT)
Dept: GYNECOLOGIC ONCOLOGY | Facility: CLINIC | Age: 73
End: 2023-08-17
Payer: MEDICARE

## 2023-08-17 VITALS
WEIGHT: 183.44 LBS | DIASTOLIC BLOOD PRESSURE: 66 MMHG | SYSTOLIC BLOOD PRESSURE: 128 MMHG | HEIGHT: 66 IN | BODY MASS INDEX: 29.48 KG/M2 | HEART RATE: 59 BPM

## 2023-08-17 DIAGNOSIS — C54.1 ENDOMETRIAL CANCER: Primary | ICD-10-CM

## 2023-08-17 PROCEDURE — 1101F PT FALLS ASSESS-DOCD LE1/YR: CPT | Mod: HCNC,CPTII,S$GLB, | Performed by: STUDENT IN AN ORGANIZED HEALTH CARE EDUCATION/TRAINING PROGRAM

## 2023-08-17 PROCEDURE — 99205 OFFICE O/P NEW HI 60 MIN: CPT | Mod: HCNC,S$GLB,, | Performed by: STUDENT IN AN ORGANIZED HEALTH CARE EDUCATION/TRAINING PROGRAM

## 2023-08-17 PROCEDURE — 1126F PR PAIN SEVERITY QUANTIFIED, NO PAIN PRESENT: ICD-10-PCS | Mod: HCNC,CPTII,S$GLB, | Performed by: STUDENT IN AN ORGANIZED HEALTH CARE EDUCATION/TRAINING PROGRAM

## 2023-08-17 PROCEDURE — 3008F PR BODY MASS INDEX (BMI) DOCUMENTED: ICD-10-PCS | Mod: HCNC,CPTII,S$GLB, | Performed by: STUDENT IN AN ORGANIZED HEALTH CARE EDUCATION/TRAINING PROGRAM

## 2023-08-17 PROCEDURE — 3074F PR MOST RECENT SYSTOLIC BLOOD PRESSURE < 130 MM HG: ICD-10-PCS | Mod: HCNC,CPTII,S$GLB, | Performed by: STUDENT IN AN ORGANIZED HEALTH CARE EDUCATION/TRAINING PROGRAM

## 2023-08-17 PROCEDURE — 3288F FALL RISK ASSESSMENT DOCD: CPT | Mod: HCNC,CPTII,S$GLB, | Performed by: STUDENT IN AN ORGANIZED HEALTH CARE EDUCATION/TRAINING PROGRAM

## 2023-08-17 PROCEDURE — 3074F SYST BP LT 130 MM HG: CPT | Mod: HCNC,CPTII,S$GLB, | Performed by: STUDENT IN AN ORGANIZED HEALTH CARE EDUCATION/TRAINING PROGRAM

## 2023-08-17 PROCEDURE — 3078F DIAST BP <80 MM HG: CPT | Mod: HCNC,CPTII,S$GLB, | Performed by: STUDENT IN AN ORGANIZED HEALTH CARE EDUCATION/TRAINING PROGRAM

## 2023-08-17 PROCEDURE — 1101F PR PT FALLS ASSESS DOC 0-1 FALLS W/OUT INJ PAST YR: ICD-10-PCS | Mod: HCNC,CPTII,S$GLB, | Performed by: STUDENT IN AN ORGANIZED HEALTH CARE EDUCATION/TRAINING PROGRAM

## 2023-08-17 PROCEDURE — 1126F AMNT PAIN NOTED NONE PRSNT: CPT | Mod: HCNC,CPTII,S$GLB, | Performed by: STUDENT IN AN ORGANIZED HEALTH CARE EDUCATION/TRAINING PROGRAM

## 2023-08-17 PROCEDURE — 99205 PR OFFICE/OUTPT VISIT, NEW, LEVL V, 60-74 MIN: ICD-10-PCS | Mod: HCNC,S$GLB,, | Performed by: STUDENT IN AN ORGANIZED HEALTH CARE EDUCATION/TRAINING PROGRAM

## 2023-08-17 PROCEDURE — 3008F BODY MASS INDEX DOCD: CPT | Mod: HCNC,CPTII,S$GLB, | Performed by: STUDENT IN AN ORGANIZED HEALTH CARE EDUCATION/TRAINING PROGRAM

## 2023-08-17 PROCEDURE — 99999 PR PBB SHADOW E&M-EST. PATIENT-LVL III: ICD-10-PCS | Mod: PBBFAC,HCNC,, | Performed by: STUDENT IN AN ORGANIZED HEALTH CARE EDUCATION/TRAINING PROGRAM

## 2023-08-17 PROCEDURE — 3044F PR MOST RECENT HEMOGLOBIN A1C LEVEL <7.0%: ICD-10-PCS | Mod: HCNC,CPTII,S$GLB, | Performed by: STUDENT IN AN ORGANIZED HEALTH CARE EDUCATION/TRAINING PROGRAM

## 2023-08-17 PROCEDURE — 3044F HG A1C LEVEL LT 7.0%: CPT | Mod: HCNC,CPTII,S$GLB, | Performed by: STUDENT IN AN ORGANIZED HEALTH CARE EDUCATION/TRAINING PROGRAM

## 2023-08-17 PROCEDURE — 1159F PR MEDICATION LIST DOCUMENTED IN MEDICAL RECORD: ICD-10-PCS | Mod: HCNC,CPTII,S$GLB, | Performed by: STUDENT IN AN ORGANIZED HEALTH CARE EDUCATION/TRAINING PROGRAM

## 2023-08-17 PROCEDURE — 1159F MED LIST DOCD IN RCRD: CPT | Mod: HCNC,CPTII,S$GLB, | Performed by: STUDENT IN AN ORGANIZED HEALTH CARE EDUCATION/TRAINING PROGRAM

## 2023-08-17 PROCEDURE — 99999 PR PBB SHADOW E&M-EST. PATIENT-LVL III: CPT | Mod: PBBFAC,HCNC,, | Performed by: STUDENT IN AN ORGANIZED HEALTH CARE EDUCATION/TRAINING PROGRAM

## 2023-08-17 PROCEDURE — 3288F PR FALLS RISK ASSESSMENT DOCUMENTED: ICD-10-PCS | Mod: HCNC,CPTII,S$GLB, | Performed by: STUDENT IN AN ORGANIZED HEALTH CARE EDUCATION/TRAINING PROGRAM

## 2023-08-17 PROCEDURE — 3078F PR MOST RECENT DIASTOLIC BLOOD PRESSURE < 80 MM HG: ICD-10-PCS | Mod: HCNC,CPTII,S$GLB, | Performed by: STUDENT IN AN ORGANIZED HEALTH CARE EDUCATION/TRAINING PROGRAM

## 2023-08-17 NOTE — Clinical Note
Tho, thanks for sending Lenore to see me. She's a wonderful lady. We are moving forward with scheduling surgery.  If you ever have questions or have patients that need to be seen quickly, don't hesitate to reach out.  I'm always happy to assist with expediting the scheduling process.  Thanks, Berry Jackson Cell: [phone]

## 2023-08-18 ENCOUNTER — TELEPHONE (OUTPATIENT)
Dept: GYNECOLOGIC ONCOLOGY | Facility: CLINIC | Age: 73
End: 2023-08-18
Payer: MEDICARE

## 2023-08-18 DIAGNOSIS — C54.1 ENDOMETRIAL CANCER: Primary | ICD-10-CM

## 2023-08-21 RX ORDER — LIDOCAINE HYDROCHLORIDE 10 MG/ML
1 INJECTION, SOLUTION EPIDURAL; INFILTRATION; INTRACAUDAL; PERINEURAL ONCE
Status: CANCELLED | OUTPATIENT
Start: 2023-08-21 | End: 2023-08-21

## 2023-08-21 RX ORDER — HEPARIN SODIUM 5000 [USP'U]/ML
5000 INJECTION, SOLUTION INTRAVENOUS; SUBCUTANEOUS
Status: CANCELLED | OUTPATIENT
Start: 2023-08-21 | End: 2023-08-22

## 2023-08-21 RX ORDER — CEFAZOLIN SODIUM 2 G/50ML
2 SOLUTION INTRAVENOUS
Status: CANCELLED | OUTPATIENT
Start: 2023-08-21

## 2023-08-24 ENCOUNTER — ANESTHESIA EVENT (OUTPATIENT)
Dept: SURGERY | Facility: OTHER | Age: 73
DRG: 735 | End: 2023-08-24
Payer: MEDICARE

## 2023-08-24 ENCOUNTER — HOSPITAL ENCOUNTER (OUTPATIENT)
Dept: PREADMISSION TESTING | Facility: OTHER | Age: 73
Discharge: HOME | End: 2023-08-24
Attending: STUDENT IN AN ORGANIZED HEALTH CARE EDUCATION/TRAINING PROGRAM | Admitting: STUDENT IN AN ORGANIZED HEALTH CARE EDUCATION/TRAINING PROGRAM
Payer: MEDICARE

## 2023-08-24 VITALS
DIASTOLIC BLOOD PRESSURE: 65 MMHG | BODY MASS INDEX: 29.57 KG/M2 | OXYGEN SATURATION: 98 % | SYSTOLIC BLOOD PRESSURE: 120 MMHG | HEART RATE: 55 BPM | HEIGHT: 66 IN | WEIGHT: 184 LBS

## 2023-08-24 DIAGNOSIS — Z01.818 PREOP TESTING: Primary | ICD-10-CM

## 2023-08-24 LAB
ABO + RH BLD: NORMAL
BLD GP AB SCN CELLS X3 SERPL QL: NORMAL
SPECIMEN OUTDATE: NORMAL

## 2023-08-24 PROCEDURE — 86900 BLOOD TYPING SEROLOGIC ABO: CPT | Mod: HCNC | Performed by: ANESTHESIOLOGY

## 2023-08-24 PROCEDURE — 36415 COLL VENOUS BLD VENIPUNCTURE: CPT | Mod: HCNC | Performed by: ANESTHESIOLOGY

## 2023-08-24 RX ORDER — SODIUM CHLORIDE, SODIUM LACTATE, POTASSIUM CHLORIDE, CALCIUM CHLORIDE 600; 310; 30; 20 MG/100ML; MG/100ML; MG/100ML; MG/100ML
INJECTION, SOLUTION INTRAVENOUS CONTINUOUS
Status: CANCELLED | OUTPATIENT
Start: 2023-08-24

## 2023-08-24 RX ORDER — LIDOCAINE HYDROCHLORIDE 10 MG/ML
0.5 INJECTION, SOLUTION EPIDURAL; INFILTRATION; INTRACAUDAL; PERINEURAL ONCE
Status: CANCELLED | OUTPATIENT
Start: 2023-08-24 | End: 2023-08-24

## 2023-08-24 RX ORDER — ACETAMINOPHEN 500 MG
1000 TABLET ORAL
Status: CANCELLED | OUTPATIENT
Start: 2023-08-24 | End: 2023-08-24

## 2023-08-24 RX ORDER — AMOXICILLIN 500 MG
CAPSULE ORAL DAILY
COMMUNITY

## 2023-08-24 RX ORDER — GLUCOSAMINE/CHONDRO SU A 500-400 MG
1 TABLET ORAL 3 TIMES DAILY
COMMUNITY

## 2023-08-24 NOTE — ANESTHESIA PREPROCEDURE EVALUATION
08/24/2023  Lenore Subramanian is a 73 y.o., female.      Pre-op Assessment    I have reviewed the Patient Summary Reports.     I have reviewed the Nursing Notes. I have reviewed the NPO Status.   I have reviewed the Medications.     Review of Systems  Anesthesia Hx:  Neg history of prior surgery. Denies Family Hx of Anesthesia complications.   Denies Personal Hx of Anesthesia complications.   Social:  Non-Smoker    Hematology/Oncology:  Hematology Normal      Current/Recent Cancer. (endometrial)   EENT/Dental:   chronic allergic rhinitis   Cardiovascular:  Cardiovascular Normal Exercise tolerance: good     Pulmonary:   Denies Asthma. Sleep Apnea, CPAP    Renal/:  Renal/ Normal     Hepatic/GI:  Hepatic/GI Normal    Musculoskeletal:   Arthritis     Neurological:  Neurology Normal    Endocrine:  Endocrine Normal    Dermatological:  Skin Normal    Psych:   anxiety          Physical Exam  General: Well nourished, Cooperative, Alert and Oriented    Airway:  Mallampati: II   Mouth Opening: Normal  TM Distance: Normal  Neck ROM: Normal ROM    Dental:  Intact        Anesthesia Plan  Type of Anesthesia, risks & benefits discussed:    Anesthesia Type: Gen ETT  Intra-op Monitoring Plan: Standard ASA Monitors  Post Op Pain Control Plan: multimodal analgesia and IV/PO Opioids PRN  Induction:  IV  Airway Plan: Video  Informed Consent: Informed consent signed with the Patient and all parties understand the risks and agree with anesthesia plan.  All questions answered.   ASA Score: 2  Anesthesia Plan Notes: Last lab in [facility]N, not repeated  T&S today    Ready For Surgery From Anesthesia Perspective.     .

## 2023-08-24 NOTE — DISCHARGE INSTRUCTIONS
Information to Prepare you for your Surgery    PRE-ADMIT TESTING -  [phone]    [address] [facility]          Your surgery has been scheduled at [facility]. We are pleased to have the opportunity to serve you. For Further Information please call [phone].    On the day of surgery please report to the Information Desk on the 1st floor.    CONTACT YOUR PHYSICIAN'S OFFICE THE DAY PRIOR TO YOUR SURGERY TO OBTAIN YOUR ARRIVAL TIME.     The evening before surgery do not eat anything after 9 p.m. ( this includes hard candy, chewing gum and mints).  You may only have GATORADE, POWERADE AND WATER  from 9 p.m. until you leave your home.   DO NOT DRINK ANY LIQUIDS ON THE WAY TO THE HOSPITAL.      Why does your anesthesiologist allow you to drink Gatorade/Powerade before surgery?  Gatorade/Powerade helps to increase your comfort before surgery and to decrease your nausea after surgery. The carbohydrates in Gatorade/Powerade help reduce your body's stress response to surgery.  If you are a diabetic-drink only water prior to surgery.       Patients may have 2 visitors pre and post procedure. Only 2 visitors will be allowed in the Surgical building with the patient. No one under the age of 12 will be allowed into the facility.    SPECIAL MEDICATION INSTRUCTIONS: TAKE medications checked off by the Anesthesiologist on your Medication List.    Angiogram Patients: Take medications as instructed by your physician, including aspirin.     Surgery Patients:    If you take ASPIRIN - Your PHYSICIAN/SURGEON will need to inform you IF/OR when you need to stop taking aspirin prior to your surgery.     The week prior to surgery do not ot take any medications containing IBUPROFEN or NSAIDS ( Advil, Motrin, Goodys, BC, Aleve, Naproxen etc) If you are not sure if you should take a medicine please call your surgeon's office.  Ok to take Tylenol    Do Not Wear any make-up  (especially eye make-up) to surgery. Please remove any false eyelashes or eyelash extensions. If you arrive the day of surgery with makeup/eyelashes on you will be required to remove prior to surgery. (There is a risk of corneal abrasions if eye makeup/eyelash extensions are not removed)      Leave all valuables at home.   Do Not wear any jewelry or watches, including any metal in body piercings. Jewelry must be removed prior to coming to the hospital.  There is a possibility that rings that are unable to be removed may be cut off if they are on the surgical extremity.    Please remove all hair extensions, wigs, clips and any other metal accessories/ ornaments from your hair.  These items may pose a flammable/fire risk in Surgery and must be removed.    Do not shave your surgical area at least 5 days prior to your surgery. The surgical prep will be performed at the hospital according to Infection Control regulations.    Contact Lens must be removed before surgery. Either do not wear the contact lens or bring a case and solution for storage.  Please bring a container for eyeglasses or dentures as required.  Bring any paperwork your physician has provided, such as consent forms,  history and physicals, doctor's orders, etc.   Bring comfortable clothes that are loose fitting to wear upon discharge. Take into consideration the type of surgery being performed.  Maintain your diet as advised per your physician the day prior to surgery.      Adequate rest the night before surgery is advised.   Park in the Parking lot behind the hospital or in the [facility] across the street from the parking lot. Parking is complimentary.  If you will be discharged the same day as your procedure, please arrange for a responsible adult to drive you home or to accompany you if traveling by taxi.   YOU WILL NOT BE PERMITTED TO DRIVE OR TO LEAVE THE HOSPITAL ALONE AFTER SURGERY.   If you are being discharged the same day, it is  strongly recommended that you arrange for someone to remain with you for the first 24 hrs following your surgery.    The Surgeon will speak to your family/visitor after your surgery regarding the outcome of your surgery and post op care.  The Surgeon may speak to you after your surgery, but there is a possibility you may not remember the details.  Please check with your family members regarding the conversation with the Surgeon.    We strongly recommend whoever is bringing you home be present for discharge instructions.  This will ensure a thorough understanding for your post op home care.    ALL CHILDREN MUST ALWAYS BE ACCOMPANIED BY AN ADULT.    Visitors-Refer to current Visitor policy handouts.    Thank you for your cooperation.  The Staff of [facility].            Bathing Instructions with Hibiclens    Shower the evening before and morning of your procedure with Chlorhexidine (Hibiclens)  do not use Chlorhexidine on your face or genitals. Do not get in your eyes.  Wash your face with water and your regular face wash/soap  Use your regular shampoo  Apply Chlorhexidine (Hibiclens) directly on your skin or on a wet washcloth and wash gently. When showering: Move away from the shower stream when applying Chlorhexidine (Hibiclens) to avoid rinsing off too soon.  Rinse thoroughly with warm water  Do not dilute Chlorhexidine (Hibiclens)   Dry off as usual, do not use any deodorant, powder, body lotions, perfume, after shave or cologne.

## 2023-08-25 ENCOUNTER — TELEPHONE (OUTPATIENT)
Dept: GYNECOLOGIC ONCOLOGY | Facility: CLINIC | Age: 73
End: 2023-08-25
Payer: MEDICARE

## 2023-08-29 ENCOUNTER — ANESTHESIA (OUTPATIENT)
Dept: SURGERY | Facility: OTHER | Age: 73
DRG: 735 | End: 2023-08-29
Payer: MEDICARE

## 2023-08-29 ENCOUNTER — HOSPITAL ENCOUNTER (INPATIENT)
Facility: OTHER | Age: 73
LOS: 1 days | Discharge: HOME | DRG: 735 | End: 2023-08-29
Attending: STUDENT IN AN ORGANIZED HEALTH CARE EDUCATION/TRAINING PROGRAM | Admitting: STUDENT IN AN ORGANIZED HEALTH CARE EDUCATION/TRAINING PROGRAM
Payer: MEDICARE

## 2023-08-29 DIAGNOSIS — C54.1 ENDOMETRIAL CANCER: ICD-10-CM

## 2023-08-29 DIAGNOSIS — Z98.890 S/P ROBOT-ASSISTED SURGICAL PROCEDURE: Primary | ICD-10-CM

## 2023-08-29 LAB — POCT GLUCOSE: 98 MG/DL (ref 70–110)

## 2023-08-29 PROCEDURE — 71000015 HC POSTOP RECOV 1ST HR: Mod: HCNC | Performed by: STUDENT IN AN ORGANIZED HEALTH CARE EDUCATION/TRAINING PROGRAM

## 2023-08-29 PROCEDURE — 36000712 HC OR TIME LEV V 1ST 15 MIN: Mod: HCNC | Performed by: STUDENT IN AN ORGANIZED HEALTH CARE EDUCATION/TRAINING PROGRAM

## 2023-08-29 PROCEDURE — 63600175 PHARM REV CODE 636 W HCPCS: Mod: HCNC | Performed by: STUDENT IN AN ORGANIZED HEALTH CARE EDUCATION/TRAINING PROGRAM

## 2023-08-29 PROCEDURE — D9220A PRA ANESTHESIA: Mod: HCNC,ANES,, | Performed by: ANESTHESIOLOGY

## 2023-08-29 PROCEDURE — 88360 TUMOR IMMUNOHISTOCHEM/MANUAL: CPT | Mod: 26,HCNC,, | Performed by: PATHOLOGY

## 2023-08-29 PROCEDURE — 63600175 PHARM REV CODE 636 W HCPCS: Mod: HCNC | Performed by: ANESTHESIOLOGY

## 2023-08-29 PROCEDURE — 88307 TISSUE EXAM BY PATHOLOGIST: CPT | Mod: 26,HCNC,, | Performed by: PATHOLOGY

## 2023-08-29 PROCEDURE — 38570 PR LAP,LYMPH NODE BX: ICD-10-PCS | Mod: AS,51,, | Performed by: NURSE PRACTITIONER

## 2023-08-29 PROCEDURE — 36000713 HC OR TIME LEV V EA ADD 15 MIN: Mod: HCNC | Performed by: STUDENT IN AN ORGANIZED HEALTH CARE EDUCATION/TRAINING PROGRAM

## 2023-08-29 PROCEDURE — 88342 IMHCHEM/IMCYTCHM 1ST ANTB: CPT | Mod: HCNC | Performed by: PATHOLOGY

## 2023-08-29 PROCEDURE — 12000002 HC ACUTE/MED SURGE SEMI-PRIVATE ROOM: Mod: HCNC

## 2023-08-29 PROCEDURE — D9220A PRA ANESTHESIA: ICD-10-PCS | Mod: HCNC,ANES,, | Performed by: ANESTHESIOLOGY

## 2023-08-29 PROCEDURE — 88309 TISSUE EXAM BY PATHOLOGIST: CPT | Mod: 26,HCNC,, | Performed by: PATHOLOGY

## 2023-08-29 PROCEDURE — 38900 IO MAP OF SENT LYMPH NODE: CPT | Mod: 50,,, | Performed by: STUDENT IN AN ORGANIZED HEALTH CARE EDUCATION/TRAINING PROGRAM

## 2023-08-29 PROCEDURE — 38900 PR INTRAOPERATIVE SENTINEL LYMPH NODE ID W DYE INJECTION: ICD-10-PCS | Mod: 50,,, | Performed by: STUDENT IN AN ORGANIZED HEALTH CARE EDUCATION/TRAINING PROGRAM

## 2023-08-29 PROCEDURE — 38900 PR INTRAOPERATIVE SENTINEL LYMPH NODE ID W DYE INJECTION: ICD-10-PCS | Mod: AS,50,, | Performed by: NURSE PRACTITIONER

## 2023-08-29 PROCEDURE — 63600175 PHARM REV CODE 636 W HCPCS: Mod: JZ,JG,HCNC | Performed by: ANESTHESIOLOGY

## 2023-08-29 PROCEDURE — 38900 IO MAP OF SENT LYMPH NODE: CPT | Mod: AS,50,, | Performed by: NURSE PRACTITIONER

## 2023-08-29 PROCEDURE — 58571 TLH W/T/O 250 G OR LESS: CPT | Mod: AS,,, | Performed by: NURSE PRACTITIONER

## 2023-08-29 PROCEDURE — 38570 PR LAP,LYMPH NODE BX: ICD-10-PCS | Mod: 51,,, | Performed by: STUDENT IN AN ORGANIZED HEALTH CARE EDUCATION/TRAINING PROGRAM

## 2023-08-29 PROCEDURE — 38570 LAPAROSCOPY LYMPH NODE BIOP: CPT | Mod: AS,51,, | Performed by: NURSE PRACTITIONER

## 2023-08-29 PROCEDURE — 25000003 PHARM REV CODE 250: Mod: HCNC | Performed by: STUDENT IN AN ORGANIZED HEALTH CARE EDUCATION/TRAINING PROGRAM

## 2023-08-29 PROCEDURE — 88342 CHG IMMUNOCYTOCHEMISTRY: ICD-10-PCS | Mod: 26,HCNC,XU, | Performed by: PATHOLOGY

## 2023-08-29 PROCEDURE — 27201423 OPTIME MED/SURG SUP & DEVICES STERILE SUPPLY: Mod: HCNC | Performed by: STUDENT IN AN ORGANIZED HEALTH CARE EDUCATION/TRAINING PROGRAM

## 2023-08-29 PROCEDURE — 58571 PR LAPAROSCOPY W TOT HYSTERECTUTERUS <=250 GRAM  W TUBE/OVARY: ICD-10-PCS | Mod: ,,, | Performed by: STUDENT IN AN ORGANIZED HEALTH CARE EDUCATION/TRAINING PROGRAM

## 2023-08-29 PROCEDURE — 88341 PR IHC OR ICC EACH ADD'L SINGLE ANTIBODY  STAINPR: ICD-10-PCS | Mod: 26,HCNC,XU, | Performed by: PATHOLOGY

## 2023-08-29 PROCEDURE — 88307 TISSUE EXAM BY PATHOLOGIST: CPT | Mod: HCNC | Performed by: PATHOLOGY

## 2023-08-29 PROCEDURE — 37000008 HC ANESTHESIA 1ST 15 MINUTES: Mod: HCNC | Performed by: STUDENT IN AN ORGANIZED HEALTH CARE EDUCATION/TRAINING PROGRAM

## 2023-08-29 PROCEDURE — 58571 PR LAPAROSCOPY W TOT HYSTERECTUTERUS <=250 GRAM  W TUBE/OVARY: ICD-10-PCS | Mod: AS,,, | Performed by: NURSE PRACTITIONER

## 2023-08-29 PROCEDURE — 88309 PR  SURG PATH,LEVEL VI: ICD-10-PCS | Mod: 26,HCNC,, | Performed by: PATHOLOGY

## 2023-08-29 PROCEDURE — 88360 TUMOR IMMUNOHISTOCHEM/MANUAL: CPT | Mod: HCNC | Performed by: PATHOLOGY

## 2023-08-29 PROCEDURE — 88307 PR  SURG PATH,LEVEL V: ICD-10-PCS | Mod: 26,HCNC,, | Performed by: PATHOLOGY

## 2023-08-29 PROCEDURE — 71000016 HC POSTOP RECOV ADDL HR: Mod: HCNC | Performed by: STUDENT IN AN ORGANIZED HEALTH CARE EDUCATION/TRAINING PROGRAM

## 2023-08-29 PROCEDURE — 37000009 HC ANESTHESIA EA ADD 15 MINS: Mod: HCNC | Performed by: STUDENT IN AN ORGANIZED HEALTH CARE EDUCATION/TRAINING PROGRAM

## 2023-08-29 PROCEDURE — 88341 IMHCHEM/IMCYTCHM EA ADD ANTB: CPT | Mod: 59,HCNC | Performed by: PATHOLOGY

## 2023-08-29 PROCEDURE — 25000003 PHARM REV CODE 250: Mod: HCNC | Performed by: ANESTHESIOLOGY

## 2023-08-29 PROCEDURE — 88360 PR  TUMOR IMMUNOHISTOCHEM/MANUAL: ICD-10-PCS | Mod: 26,HCNC,, | Performed by: PATHOLOGY

## 2023-08-29 PROCEDURE — P9045 ALBUMIN (HUMAN), 5%, 250 ML: HCPCS | Mod: JZ,JG,HCNC | Performed by: ANESTHESIOLOGY

## 2023-08-29 PROCEDURE — 38570 LAPAROSCOPY LYMPH NODE BIOP: CPT | Mod: 51,,, | Performed by: STUDENT IN AN ORGANIZED HEALTH CARE EDUCATION/TRAINING PROGRAM

## 2023-08-29 PROCEDURE — 71000039 HC RECOVERY, EACH ADD'L HOUR: Mod: HCNC | Performed by: STUDENT IN AN ORGANIZED HEALTH CARE EDUCATION/TRAINING PROGRAM

## 2023-08-29 PROCEDURE — D9220A PRA ANESTHESIA: Mod: HCNC,CRNA,, | Performed by: STUDENT IN AN ORGANIZED HEALTH CARE EDUCATION/TRAINING PROGRAM

## 2023-08-29 PROCEDURE — D9220A PRA ANESTHESIA: ICD-10-PCS | Mod: HCNC,CRNA,, | Performed by: STUDENT IN AN ORGANIZED HEALTH CARE EDUCATION/TRAINING PROGRAM

## 2023-08-29 PROCEDURE — 88341 IMHCHEM/IMCYTCHM EA ADD ANTB: CPT | Mod: 26,HCNC,XU, | Performed by: PATHOLOGY

## 2023-08-29 PROCEDURE — 88309 TISSUE EXAM BY PATHOLOGIST: CPT | Mod: HCNC | Performed by: PATHOLOGY

## 2023-08-29 PROCEDURE — 88342 IMHCHEM/IMCYTCHM 1ST ANTB: CPT | Mod: 26,HCNC,XU, | Performed by: PATHOLOGY

## 2023-08-29 PROCEDURE — 71000033 HC RECOVERY, INTIAL HOUR: Mod: HCNC | Performed by: STUDENT IN AN ORGANIZED HEALTH CARE EDUCATION/TRAINING PROGRAM

## 2023-08-29 PROCEDURE — 58571 TLH W/T/O 250 G OR LESS: CPT | Mod: ,,, | Performed by: STUDENT IN AN ORGANIZED HEALTH CARE EDUCATION/TRAINING PROGRAM

## 2023-08-29 RX ORDER — KETAMINE HCL IN 0.9 % NACL 50 MG/5 ML
SYRINGE (ML) INTRAVENOUS
Status: DISCONTINUED | OUTPATIENT
Start: 2023-08-29 | End: 2023-08-29

## 2023-08-29 RX ORDER — HYDROMORPHONE HYDROCHLORIDE 2 MG/ML
0.4 INJECTION, SOLUTION INTRAMUSCULAR; INTRAVENOUS; SUBCUTANEOUS EVERY 5 MIN PRN
Status: DISCONTINUED | OUTPATIENT
Start: 2023-08-29 | End: 2023-08-29 | Stop reason: HOSPADM

## 2023-08-29 RX ORDER — ACETAMINOPHEN 500 MG
1000 TABLET ORAL
Status: COMPLETED | OUTPATIENT
Start: 2023-08-29 | End: 2023-08-29

## 2023-08-29 RX ORDER — LIDOCAINE HYDROCHLORIDE 10 MG/ML
1 INJECTION, SOLUTION EPIDURAL; INFILTRATION; INTRACAUDAL; PERINEURAL ONCE
Status: DISCONTINUED | OUTPATIENT
Start: 2023-08-29 | End: 2023-08-29 | Stop reason: HOSPADM

## 2023-08-29 RX ORDER — ONDANSETRON 2 MG/ML
INJECTION INTRAMUSCULAR; INTRAVENOUS
Status: DISCONTINUED | OUTPATIENT
Start: 2023-08-29 | End: 2023-08-29

## 2023-08-29 RX ORDER — HEPARIN SODIUM 5000 [USP'U]/ML
5000 INJECTION, SOLUTION INTRAVENOUS; SUBCUTANEOUS
Status: COMPLETED | OUTPATIENT
Start: 2023-08-29 | End: 2023-08-29

## 2023-08-29 RX ORDER — DEXAMETHASONE SODIUM PHOSPHATE 4 MG/ML
INJECTION, SOLUTION INTRA-ARTICULAR; INTRALESIONAL; INTRAMUSCULAR; INTRAVENOUS; SOFT TISSUE
Status: DISCONTINUED | OUTPATIENT
Start: 2023-08-29 | End: 2023-08-29

## 2023-08-29 RX ORDER — OXYCODONE HYDROCHLORIDE 5 MG/1
5 TABLET ORAL
Status: DISCONTINUED | OUTPATIENT
Start: 2023-08-29 | End: 2023-08-29 | Stop reason: HOSPADM

## 2023-08-29 RX ORDER — SODIUM CHLORIDE 0.9 % (FLUSH) 0.9 %
3 SYRINGE (ML) INJECTION
Status: DISCONTINUED | OUTPATIENT
Start: 2023-08-29 | End: 2023-08-29 | Stop reason: HOSPADM

## 2023-08-29 RX ORDER — SODIUM CHLORIDE, SODIUM LACTATE, POTASSIUM CHLORIDE, CALCIUM CHLORIDE 600; 310; 30; 20 MG/100ML; MG/100ML; MG/100ML; MG/100ML
INJECTION, SOLUTION INTRAVENOUS CONTINUOUS
Status: DISCONTINUED | OUTPATIENT
Start: 2023-08-29 | End: 2023-08-29 | Stop reason: HOSPADM

## 2023-08-29 RX ORDER — EPHEDRINE SULFATE 50 MG/ML
INJECTION, SOLUTION INTRAVENOUS
Status: DISCONTINUED | OUTPATIENT
Start: 2023-08-29 | End: 2023-08-29

## 2023-08-29 RX ORDER — INDOCYANINE GREEN AND WATER 25 MG
KIT INJECTION
Status: DISCONTINUED | OUTPATIENT
Start: 2023-08-29 | End: 2023-08-29 | Stop reason: HOSPADM

## 2023-08-29 RX ORDER — PROPOFOL 10 MG/ML
VIAL (ML) INTRAVENOUS
Status: DISCONTINUED | OUTPATIENT
Start: 2023-08-29 | End: 2023-08-29

## 2023-08-29 RX ORDER — MEPERIDINE HYDROCHLORIDE 25 MG/ML
12.5 INJECTION INTRAMUSCULAR; INTRAVENOUS; SUBCUTANEOUS ONCE AS NEEDED
Status: DISCONTINUED | OUTPATIENT
Start: 2023-08-29 | End: 2023-08-29 | Stop reason: HOSPADM

## 2023-08-29 RX ORDER — PROCHLORPERAZINE EDISYLATE 5 MG/ML
5 INJECTION INTRAMUSCULAR; INTRAVENOUS EVERY 30 MIN PRN
Status: DISCONTINUED | OUTPATIENT
Start: 2023-08-29 | End: 2023-08-29 | Stop reason: HOSPADM

## 2023-08-29 RX ORDER — IBUPROFEN 600 MG/1
600 TABLET ORAL EVERY 6 HOURS PRN
Qty: 30 TABLET | Refills: 3 | Status: SHIPPED | OUTPATIENT
Start: 2023-08-29 | End: 2023-09-07

## 2023-08-29 RX ORDER — SENNOSIDES 8.6 MG/1
1 TABLET ORAL 2 TIMES DAILY PRN
Qty: 30 TABLET | Refills: 3 | Status: SHIPPED | OUTPATIENT
Start: 2023-08-29 | End: 2023-10-02

## 2023-08-29 RX ORDER — ROCURONIUM BROMIDE 10 MG/ML
INJECTION, SOLUTION INTRAVENOUS
Status: DISCONTINUED | OUTPATIENT
Start: 2023-08-29 | End: 2023-08-29

## 2023-08-29 RX ORDER — FENTANYL CITRATE 50 UG/ML
INJECTION, SOLUTION INTRAMUSCULAR; INTRAVENOUS
Status: DISCONTINUED | OUTPATIENT
Start: 2023-08-29 | End: 2023-08-29

## 2023-08-29 RX ORDER — ALBUMIN HUMAN 50 G/1000ML
12.5 SOLUTION INTRAVENOUS ONCE
Status: COMPLETED | OUTPATIENT
Start: 2023-08-29 | End: 2023-08-29

## 2023-08-29 RX ORDER — LIDOCAINE HYDROCHLORIDE 20 MG/ML
INJECTION INTRAVENOUS
Status: DISCONTINUED | OUTPATIENT
Start: 2023-08-29 | End: 2023-08-29

## 2023-08-29 RX ORDER — KETOROLAC TROMETHAMINE 30 MG/ML
INJECTION, SOLUTION INTRAMUSCULAR; INTRAVENOUS
Status: DISCONTINUED | OUTPATIENT
Start: 2023-08-29 | End: 2023-08-29

## 2023-08-29 RX ORDER — LIDOCAINE HYDROCHLORIDE 10 MG/ML
0.5 INJECTION, SOLUTION EPIDURAL; INFILTRATION; INTRACAUDAL; PERINEURAL ONCE
Status: DISCONTINUED | OUTPATIENT
Start: 2023-08-29 | End: 2023-08-29 | Stop reason: HOSPADM

## 2023-08-29 RX ORDER — OXYCODONE HYDROCHLORIDE 5 MG/1
5 TABLET ORAL EVERY 4 HOURS PRN
Qty: 10 TABLET | Refills: 0 | Status: SHIPPED | OUTPATIENT
Start: 2023-08-29 | End: 2023-10-02

## 2023-08-29 RX ORDER — CEFAZOLIN SODIUM 1 G/3ML
INJECTION, POWDER, FOR SOLUTION INTRAMUSCULAR; INTRAVENOUS
Status: DISCONTINUED | OUTPATIENT
Start: 2023-08-29 | End: 2023-08-29

## 2023-08-29 RX ORDER — DEXTROMETHORPHAN HYDROBROMIDE, GUAIFENESIN 5; 100 MG/5ML; MG/5ML
650 LIQUID ORAL EVERY 6 HOURS PRN
Qty: 30 TABLET | Refills: 3 | Status: SHIPPED | OUTPATIENT
Start: 2023-08-29

## 2023-08-29 RX ORDER — CEFAZOLIN SODIUM 1 G/3ML
2 INJECTION, POWDER, FOR SOLUTION INTRAMUSCULAR; INTRAVENOUS
Status: DISCONTINUED | OUTPATIENT
Start: 2023-08-29 | End: 2023-08-29 | Stop reason: HOSPADM

## 2023-08-29 RX ADMIN — Medication 30 MG: at 07:08

## 2023-08-29 RX ADMIN — FENTANYL CITRATE 50 MCG: 50 INJECTION, SOLUTION INTRAMUSCULAR; INTRAVENOUS at 07:08

## 2023-08-29 RX ADMIN — ONDANSETRON HYDROCHLORIDE 4 MG: 2 INJECTION INTRAMUSCULAR; INTRAVENOUS at 09:08

## 2023-08-29 RX ADMIN — SODIUM CHLORIDE, SODIUM LACTATE, POTASSIUM CHLORIDE, AND CALCIUM CHLORIDE: 600; 310; 30; 20 INJECTION, SOLUTION INTRAVENOUS at 06:08

## 2023-08-29 RX ADMIN — EPHEDRINE SULFATE 10 MG: 50 INJECTION INTRAVENOUS at 08:08

## 2023-08-29 RX ADMIN — ACETAMINOPHEN 1000 MG: 500 TABLET ORAL at 05:08

## 2023-08-29 RX ADMIN — HEPARIN SODIUM 5000 UNITS: 5000 INJECTION INTRAVENOUS; SUBCUTANEOUS at 05:08

## 2023-08-29 RX ADMIN — Medication 20 MG: at 08:08

## 2023-08-29 RX ADMIN — DEXAMETHASONE SODIUM PHOSPHATE 8 MG: 4 INJECTION, SOLUTION INTRAMUSCULAR; INTRAVENOUS at 07:08

## 2023-08-29 RX ADMIN — SODIUM CHLORIDE, SODIUM LACTATE, POTASSIUM CHLORIDE, AND CALCIUM CHLORIDE: 600; 310; 30; 20 INJECTION, SOLUTION INTRAVENOUS at 08:08

## 2023-08-29 RX ADMIN — GLYCOPYRROLATE 0.2 MG: 0.2 INJECTION, SOLUTION INTRAMUSCULAR; INTRAVITREAL at 07:08

## 2023-08-29 RX ADMIN — ROCURONIUM BROMIDE 20 MG: 10 INJECTION INTRAVENOUS at 08:08

## 2023-08-29 RX ADMIN — PROPOFOL 180 MG: 10 INJECTION, EMULSION INTRAVENOUS at 07:08

## 2023-08-29 RX ADMIN — ROCURONIUM BROMIDE 20 MG: 10 INJECTION INTRAVENOUS at 07:08

## 2023-08-29 RX ADMIN — ALBUMIN (HUMAN) 12.5 G: 12.5 SOLUTION INTRAVENOUS at 10:08

## 2023-08-29 RX ADMIN — EPHEDRINE SULFATE 10 MG: 50 INJECTION INTRAVENOUS at 07:08

## 2023-08-29 RX ADMIN — CEFAZOLIN 2 G: 330 INJECTION, POWDER, FOR SOLUTION INTRAMUSCULAR; INTRAVENOUS at 07:08

## 2023-08-29 RX ADMIN — FENTANYL CITRATE 50 MCG: 50 INJECTION, SOLUTION INTRAMUSCULAR; INTRAVENOUS at 06:08

## 2023-08-29 RX ADMIN — PROCHLORPERAZINE EDISYLATE 5 MG: 5 INJECTION, SOLUTION INTRAMUSCULAR; INTRAVENOUS at 10:08

## 2023-08-29 RX ADMIN — LIDOCAINE HYDROCHLORIDE 100 MG: 20 INJECTION, SOLUTION INTRAVENOUS at 07:08

## 2023-08-29 RX ADMIN — ROCURONIUM BROMIDE 50 MG: 10 INJECTION INTRAVENOUS at 07:08

## 2023-08-29 RX ADMIN — KETOROLAC TROMETHAMINE 30 MG: 30 INJECTION, SOLUTION INTRAMUSCULAR; INTRAVENOUS at 09:08

## 2023-08-29 RX ADMIN — SUGAMMADEX 200 MG: 100 INJECTION, SOLUTION INTRAVENOUS at 09:08

## 2023-08-29 NOTE — ANESTHESIA PROCEDURE NOTES
Intubation    Date/Time: 8/29/2023 7:04 AM    Performed by: Dylan Garcia CRNA  Authorized by: Petros Rdz MD    Intubation:     Induction:  Intravenous    Intubated:  Postinduction    Mask Ventilation:  Easy with oral airway    Attempts:  1    Attempted By:  CRNA    Method of Intubation:  Video laryngoscopy    Blade:  Knight 3    Laryngeal View Grade: Grade I - full view of cords      Difficult Airway Encountered?: No      Complications:  None    Airway Device:  Oral endotracheal tube    Airway Device Size:  7.5    Style/Cuff Inflation:  Cuffed (inflated to minimal occlusive pressure)    Tube secured:  22    Secured at:  The lips    Placement Verified By:  Capnometry    Complicating Factors:  None    Findings Post-Intubation:  BS equal bilateral and atraumatic/condition of teeth unchanged

## 2023-08-29 NOTE — INTERVAL H&P NOTE
The patient has been examined and the H&P has been reviewed:    I concur with the findings and no changes have occurred since H&P was written.    Heparin administered at 0550    Surgery risks, benefits and alternative options discussed and understood by patient/family.      Active Hospital Problems    Diagnosis  POA    Endometrial cancer [C54.1]  Yes     7/28/23: EMB FIGO G1 endometrioid endometrial carcinoma        Resolved Hospital Problems   No resolved problems to display.

## 2023-08-29 NOTE — ANESTHESIA POSTPROCEDURE EVALUATION
Anesthesia Post Evaluation    Patient: Lenore Subramanian    Procedure(s) Performed: Procedure(s) (LRB):  XI ROBOTIC HYSTERECTOMY (N/A)  XI ROBOTIC SALPINGO-OOPHORECTOMY (Bilateral)  MAPPING, LYMPH NODE, SENTINEL (N/A)  BIOPSY, LYMPH NODE (N/A)  URETEROLYSIS, ROBOTIC (Left)    Final Anesthesia Type: general      Patient location during evaluation: PACU  Patient participation: Yes- Able to Participate  Level of consciousness: awake and alert  Post-procedure vital signs: reviewed and stable  Pain management: adequate  Airway patency: patent  HERVE mitigation strategies: Extubation while patient is awake  PONV status at discharge: No PONV  Anesthetic complications: no      Cardiovascular status: hemodynamically stable  Respiratory status: unassisted  Hydration status: euvolemic  Follow-up not needed.          Vitals Value Taken Time   /59 08/29/23 1145   Temp 36.6 °C (97.8 °F) 08/29/23 1145   Pulse 67 08/29/23 1145   Resp 17 08/29/23 1145   SpO2 97 % 08/29/23 1145         Event Time   Out of Recovery 11:49:34         Pain/Beronica Score: Pain Rating Prior to Med Admin: 0 (8/29/2023  5:49 AM)  Beronica Score: 9 (8/29/2023 11:30 AM)

## 2023-08-29 NOTE — PLAN OF CARE
Lenore EUGENE Moris has met all discharge criteria from Phase II. Vital Signs are stable, ambulating  without difficulty. Discharge instructions given, patient verbalized understanding. Discharged from facility via wheelchair in stable condition.

## 2023-08-29 NOTE — OP NOTE
DATE OF PROCEDURE:  8/29/23     SURGEON: Berry Jackson MD        ASSISTANTS: MD Vannessa Agosto MD       PREOPERATIVE DIAGNOSES: Grade 1 endometrioid endometrial carcinoma     POSTOPERATIVE DIAGNOSES: Grade 1 endometrioid endometrial carcinoma     PROCEDURES:  Robotic-assisted laparoscopic hysterectomy and  bilateral   salpingo-oophorectomy   Robotic assisted laparoscopic bilateral sentinel lymph node resection  Injection for sentinel lymph node mapping       COMPLICATIONS: None     ESTIMATED BLOOD LOSS: 50 cc     ANESTHESIA: GETA     INTRAOPERATIVE FINDINGS:  Normal appearing uterus and ovaries. V care perforated through fundus and then gently retracted into the uterus. Mapping to right obturator lymph node and left external iliac lymph node. Small bowel adherent to right IP. Sigmoid colon with adhesions to left IP. L fallopian tube with adhesions to round ligament.        PROCEDURE IN DETAIL: Informed consent was obtained and the patient was taken to   the Operating Suite.  General anesthesia was administered.  Once felt to be   adequate, she was placed in dorsal lithotomy position with her arms tucked.  The   abdomen and pelvis were prepped and draped in the usual fashion.  A Lackey catheter was placed to gravity drainage and a speculum was placed in the vagina.  The cervix was visualized, grasped with a single-tooth tenaculum and the uterus sounded to approximately . ICG was injected into the cervix bilaterally.  Serial dilation of cervix was performed and a medium VCare manipulator was placed without difficulty.   Attention was turned to the abdominal portion of the procedure. An 8 mm supra umbilical incision was made and a Veress needle was placed in the peritoneal cavity, confirmed by low opening pressure.  Pneumoperitoneum was obtained with carbon dioxide up to 15 mmHg and a robotic trocar was placed through through the incision.  Intraperitoneal placement was confirmed with the camera.     Three additional robotic trocars were placed in the right and left mid clavicular lines and the left mid axillary line. A 5mm AirSeal port was placed in the right mid axillary line. All ports were placed under direct visualization. Abdominal survey revealed findings as above.  The patient was placed in steep Trendelenburg and the bowel was retracted out of the pelvis.     The robot was docked and instruments were passed in the operative field.     Anatomy was restored by lysing adhesions of the small bowel to the right IP and releasing the sigmoid colon from the left IP and freeing adhesions of the left fallopian tube from the round ligament.    Attention was turned to the right side. The retroperitoneum was opened parallel to the infundibulopelvic ligament. There paravesical and pararectal spaces were opened and a sentinel lymph node was identified in the obturator space. The ureter and obturator nerve were visualized and kept out of the field of dissection. The sentinel lymph node was grasped and resected and placed in a bag and removed through the robotic port. On the contralateral side, a similar procedure was performed. The sentinel lymph node was identified overlying the left external iliac artery and vein. This was resected ensuring that the ureter and obturator nerve were out of the field of dissection. During the sentinel lymph node dissection, Nuknit was used to aid with hemostasis of small perforating vessels. The Nuknit was removed at the end of the case.    The ureters were identified and a windows were created inferior to the ovarian vessels, ensuring that the ureters were out of the field of dissection. The ovarian vessels were cauterized and transected.     The posterior leaves of the broad ligament were dissected down to the level of the cup. The bilateral round ligaments and anterior leaves of the broad ligament were transected and the vesicouterine peritoneum was incised. The bladder was  reflected down below the level of the cup and an anterior colopotomy was created. The uterine vessels were further skeletonized, cauterized, and transected, followed by cauterization and transection of the remaining cardinal ligaments. The colpotomy was extended circumferentially and the specimen was removed through the vagina.     The cuff was then closed with a 2-0 V Lock suture.  The ureters were again inspected and further mobilized and traced passed the area of dissection and noted not to be compromised. The pelvis was irrigated and Kaylah was applied to the pelvis.  Once hemostasis was confirmed, the instruments were removed, the robot was undocked and the pneumoperitoneum was evacuated.  The patient was flattened.  All ports were removed and port sites were inspected and made hemostatic with electrocautery and closed with subcuticular 4-0 Monocryl suture and dermabond. The patient was awoken and taken to Recovery Room in stable condition.  I was present for and performed all key aspects of procedure.        Berry Jackson MD

## 2023-08-29 NOTE — DISCHARGE SUMMARY
[facility] ([place])  Brief Operative Note     SUMMARY     Surgery Date: 8/29/2023     Surgeon(s) and Role:     * Berry Lema MD - Primary     * Kaitlyn Viveros MD - Resident - Assisting      * Vannessa Bhagat MD - Resident - Assisting      Pre-op Diagnosis:  Endometrial cancer [C54.1]    Post-op Diagnosis:  Post-Op Diagnosis Codes:     * Endometrial cancer [C54.1]    Procedure(s) (LRB):  XI ROBOTIC HYSTERECTOMY (N/A)  XI ROBOTIC SALPINGO-OOPHORECTOMY (Bilateral)  MAPPING, LYMPH NODE, SENTINEL (N/A)  BIOPSY, LYMPH NODE (N/A)  URETEROLYSIS, ROBOTIC (Left)    Anesthesia: General    Findings/Key Components:   1. Normal appearing uterus and ovaries. V care perforated through fundus and then gently retracted into the uterus. Mapping to right obturator lymph node and left external iliac lymph node. Small bowel adherent to right IP. Sigmoid colon with adhesions to left IP. L fallopian tube with adhesions to round ligament.      Estimated Blood Loss: 50 cc    IVF: per anesthesia     UOP: per anesthesia     Complications: none           Specimens:   Specimen (24h ago, onward)       Start     Ordered    08/29/23 0911  Specimen to Pathology, Surgery Gynecology and Obstetrics  Once        Comments: Pre-op Diagnosis: Endometrial cancer [C54.1]Procedure(s):XI ROBOTIC HYSTERECTOMYXI ROBOTIC SALPINGO-OOPHORECTOMYMAPPING, LYMPH NODE, SENTINELBIOPSY, LYMPH NODE Number of specimens: 3Name of specimens: 1. RIGHT PELVIC SENTINEL LYMPH NODE2. LEFT PELVIC SENTINEL LYMPH NODE3. UTERUS, CERVIX, BILATERAL FALLOPIAN TUBES, BILATERAL OVARIES     References:    Click here for ordering Quick Tip   Question Answer Comment   Procedure Type: Gynecology and Obstetrics    Specimen Class: Known or suspected malignancy    Which provider would you like to cc? BERRY LEMA    Release to patient Immediate        08/29/23 0911                    Discharge Note    SUMMARY     Admit Date: 8/29/2023    Discharge Date:  08/29/2023  10:44 AM    Hospital Course: Patient was admitted for the above mentioned procedure.  Procedure was performed without difficulty.  Please see operative report for further details.  She was transferred to recovery in stable condition and discharged home the same day.      Final Diagnosis: Post-Op Diagnosis Codes:     * Endometrial cancer [C54.1]    Disposition: Home or Self Care    Follow Up/Patient Instructions: see below    Medications:  Reconciled Home Medications:      Medication List        START taking these medications      acetaminophen 650 MG Tbsr  Commonly known as: TYLENOL  Take 1 tablet (650 mg total) by mouth every 6 (six) hours as needed.     ibuprofen 600 MG tablet  Commonly known as: ADVIL,MOTRIN  Take 1 tablet (600 mg total) by mouth every 6 (six) hours as needed for Pain.     oxyCODONE 5 MG immediate release tablet  Commonly known as: ROXICODONE  Take 1 tablet (5 mg total) by mouth every 4 (four) hours as needed for Pain.     senna 8.6 mg tablet  Commonly known as: SENNA  Take 1 tablet by mouth 2 (two) times daily as needed for Constipation.            CONTINUE taking these medications      albuterol 90 mcg/actuation inhaler  Commonly known as: PROVENTIL/VENTOLIN HFA  Inhale 2 puffs into the lungs every 6 (six) hours as needed for Wheezing. Rescue     aspirin 81 MG EC tablet  Commonly known as: ECOTRIN  Take 81 mg by mouth once daily.     CALCIUM WITH VITAMIN D3 ORAL  Take by mouth.     cetirizine 10 MG tablet  Commonly known as: ZYRTEC  TAKE 1 TABLET(10 MG) BY MOUTH EVERY DAY     EScitalopram oxalate 20 MG tablet  Commonly known as: LEXAPRO  Take 1 tablet (20 mg total) by mouth once daily.     estradioL 0.01 % (0.1 mg/gram) vaginal cream  Commonly known as: ESTRACE  Place 0.5 g vaginally twice a week. Insert 0.5grams intravaginally twice weekly     EYE VITAMIN AND MINERALS ORAL  Take by mouth.     fish oil-omega-3 fatty acids 300-1,000 mg capsule  Take by mouth once daily.     FLAXSEED OIL  ORAL  Take by mouth.     fluticasone propionate 50 mcg/actuation nasal spray  Commonly known as: FLONASE  USE 1 TO 2 SPRAYS IN EACH NOSTRIL ONCE DAILY     glucosamine-chondroitin 500-400 mg tablet  Take 1 tablet by mouth 3 (three) times daily.     valACYclovir 1000 MG tablet  Commonly known as: VALTREX  Take 1 tablet (1,000 mg total) by mouth daily as needed.            Discharge Procedure Orders   Diet general     Lifting restrictions   Order Comments: No lifting greater than 15 pounds for six weeks.     Other restrictions (specify):   Order Comments: PELVIC REST:  No douching, tampons, or intercourse for 6 weeks.    If prescribed, vaginal estrogen cream may be used during the postoperative period.     DRIVING:  No driving while on narcotics. Driving may be resumed initially with a competent passenger one to two weeks after surgery if no longer taking narcotics.     EXERCISE:  For six weeks your exercise should be limited to walking. You may walk as far as you wish, as long as you increase your level of exertion gradually and avoid slippery surfaces. You may climb stairs as needed to get around, but should not use stair climbing for exercise.     Remove dressing in 24 hours   Order Comments: If you have a bandage on wound, you may remove it the day after dismissal.  If you had steri-strips remove them once they begin to peel off (usually 2 weeks). Keep incision clean and dry.  Inspect the incision daily for signs and symptoms of infection.     Wound care routine (specify)   Order Comments: WOUND CARE:  If you have a band-aid or bandage on your wound, you may remove it the day after dismissal.  If you had steri-strips remove them once they begin to peel off (usually 2 weeks).  If your steri-strips still haven't come off in 2 weeks, please remove them. You may wash the wound with mild soap and water.   You may shower at any time but should avoid immersing any abdominal incisions in water for at least two weeks after  surgery or until the wound is completely healed. If given, please shower with Hibiclens soap until bottle is completely finished. Keep your wound clean and dry.  You should observe your incision for signs of infection which include redness, warmth, drainage or fever.     Call MD for:  temperature >100.4     Call MD for:  persistent nausea and vomiting     Call MD for:  severe uncontrolled pain     Call MD for:  difficulty breathing, headache or visual disturbances     Call MD for:  redness, tenderness, or signs of infection (pain, swelling, redness, odor or green/yellow discharge around incision site)     Call MD for:  hives     Call MD for:   Order Comments: inability to void,urine is ketchup colored or you have large clots, vaginal bleeding is heavier than a period.    VAGINAL DISCHARGE: You may develop a vaginal discharge and intermittent vaginal spotting after surgery and up to 6 weeks postoperatively.  The discharge may have an odor and may change in color but it is normal.  This is due to dissolving stiches.  Contact your surgical team if you develop vaginal or vulvar irritation along with a discharge.  Also contact your surgical team if you have vaginal discharge that smells like urine or stool.    CONSTIPATION REMEDIES: Patients are often constipated after surgery or with use of oral narcotic medicine. You should continue to take the stool softener, Senokot-S during the next six weeks, and consume adequate amounts of water.  If you have not had a bowel movement for 3 days after dismissal, or are uncomfortable and unable to pass stool, please try one or all of the following measures:  1.  Milk of Magnesia - 30 cc by mouth every 12 hours   2.  Dulcolax suppository - One suppository per rectum every 4-6 hours   3.  Metamucil, Fibercon or other bulk former - use as directed  4.  Fleets Enema        PAIN MEDICATIONS:     Take your pain medications as instructed. It is best to take pain medications before your  pain becomes severe. This will allow you to take less medication yet have better pain relief. For the first 2 or 3 days it may be helpful to take your pain medications on a regular schedule (e.g. every 4 to 6 hours). This will help you to keep your pain under better control. You should then begin to take fewer medications each day until you no longer need them. Do not take pain medication on an empty stomach. This may lead to nausea and vomiting.     Activity as tolerated   Order Comments: Return to normal activity slowly as you feel able.  For 6 weeks your exercise should be limited to walking.  You may walk as far as you wish, as long as you increase your level of exertion gradually and avoid slippery surfaces.    If you had a hysterectomy at the surgery do not insert anything in your vagina for 9 weeks.     Shower on day dressing removed (No bath)   Order Comments: You may shower at any time but should avoid immersing any abdominal incisions in water for at least 2 weeks after surgery or until the wound is completely healed.  If given, please shower with Hibaclens soap until bottle is completely finish      Follow-up Information       Berry Jackson MD Follow up on 9/14/2023.    Specialty: Obstetrics and Gynecology  Why: Post op visit  Contact information:  [address]  [address] [id number] [phone]                           Barbra Farooq MD  Obstetrics and Gynecology, PGY-1

## 2023-08-29 NOTE — TRANSFER OF CARE
Anesthesia Transfer of Care Note    Patient: Lenore Subramanian    Procedure(s) Performed: Procedure(s) (LRB):  XI ROBOTIC HYSTERECTOMY (N/A)  XI ROBOTIC SALPINGO-OOPHORECTOMY (Bilateral)  MAPPING, LYMPH NODE, SENTINEL (N/A)  BIOPSY, LYMPH NODE (N/A)  URETEROLYSIS, ROBOTIC (Left)    Patient location: PACU    Anesthesia Type: general    Transport from OR: Transported from OR on 6-10 L/min O2 by face mask with adequate spontaneous ventilation    Post pain: adequate analgesia    Post assessment: no apparent anesthetic complications and tolerated procedure well    Post vital signs: stable    Level of consciousness: awake and alert    Nausea/Vomiting: no nausea/vomiting    Complications: none    Transfer of care protocol was followed      Last vitals:   Visit Vitals  /66 (BP Location: Right arm, Patient Position: Lying)   Pulse 71   Temp 36.3 °C (97.3 °F) (Temporal)   Resp 16   LMP 04/09/2010   SpO2 98%   Breastfeeding No

## 2023-08-30 ENCOUNTER — PATIENT OUTREACH (OUTPATIENT)
Dept: ADMINISTRATIVE | Facility: CLINIC | Age: 73
End: 2023-08-30
Payer: MEDICARE

## 2023-08-30 VITALS
RESPIRATION RATE: 18 BRPM | HEART RATE: 66 BPM | OXYGEN SATURATION: 98 % | SYSTOLIC BLOOD PRESSURE: 140 MMHG | TEMPERATURE: 98 F | DIASTOLIC BLOOD PRESSURE: 67 MMHG

## 2023-09-01 ENCOUNTER — TELEPHONE (OUTPATIENT)
Dept: GYNECOLOGIC ONCOLOGY | Facility: CLINIC | Age: 73
End: 2023-09-01
Payer: MEDICARE

## 2023-09-01 ENCOUNTER — TELEPHONE (OUTPATIENT)
Dept: ADMINISTRATIVE | Facility: CLINIC | Age: 73
End: 2023-09-01
Payer: MEDICARE

## 2023-09-01 ENCOUNTER — NURSE TRIAGE (OUTPATIENT)
Dept: ADMINISTRATIVE | Facility: CLINIC | Age: 73
End: 2023-09-01
Payer: MEDICARE

## 2023-09-01 NOTE — TELEPHONE ENCOUNTER
Pt calling states that she had surgery on 8/29/23. States that the far L incision has some mild bruising around the site that she noticed today. Denies any opening, drainage, bleeding and fever. Per protocol advised to HOME CARE. verbalized understanding. Denies any further questions or concerns at this time, advised to call back if they have any that come up. Advised pt to call back with any other concerns or worsening symptoms. Verbalized understanding and will route message to provider.     Reason for Disposition   Mild bruising near incision site    Additional Information   Negative: Major abdominal surgical incision and wound gaping open with visible internal organs   Negative: Sounds like a life-threatening emergency to the triager   Negative: Bleeding from incision and won't stop after 10 minutes of direct pressure   Negative: Bleeding (more than a few drops) from incision and after tracheostomy or blood vessel surgery (e.g., carotid endarterectomy, femoral bypass graft, kidney dialysis fistula)   Negative: Bright red, wide-spread, sunburn-like rash   Negative: SEVERE pain in the incision   Negative: Incision gaping open and < 2 days (48 hours) since wound re-opened   Negative: Incision gaping open and length of opening > 2 inches (5 cm)   Negative: Patient sounds very sick or weak to the triager   Negative: Sounds like a serious complication to the triager   Negative: Fever > 100.4 F (38.0 C)   Negative: Incision looks infected (spreading redness, pain)   Negative: Red streak runs from the incision and longer than 1 inch (2.5 cm)   Negative: Pus or bad-smelling fluid draining from incision   Negative: Dressing soaked with blood or body fluid (e.g., drainage)   Negative: Raised bruise and size > 2 inches (5 cm) and expanding   Negative: Scant bleeding (e.g., few drops) from incision and after tracheostomy or blood vessel surgery (e.g., carotid endarterectomy, femoral bypass graft, kidney dialysis fistula    Negative: Caller has URGENT question and triager unable to answer question   Negative: Incision on the face gaping open and length of opening > 1/4 inch (6 mm), and over 2 days since wound re-opened   Negative: Incision gaping open and length of opening > 1/2 inch (1 cm) and over 2 days since wound re-opened   Negative: Clear or blood-tinged fluid draining from wound and no fever   Negative: Suture or staple removal is overdue   Negative: Patient wants to be seen   Negative: INCREASING pain in incision and over 2 days (48 hours) since surgery   Negative: Suture or staple came out early and caller wants wound checked   Negative: Caller has NON-URGENT question and triager unable to answer question   Negative: Pimple where a stitch comes through the skin   Negative: Suture or staple came out early    Protocols used: Post-Op Incision Symptoms and [id number]

## 2023-09-01 NOTE — TELEPHONE ENCOUNTER
----- Message from Lila Stern PharmD sent at 9/1/2023  1:02 PM CDT -----  Regarding: Call back request  Good afternoon. I'm a pharmacist with the post-discharge department.    While on a call with Ms. Subramanian to review her medications, she mentioned that there is bruising around one of the incision sites. She would like to know if that is normal and requests a call back from a nurse to discuss further. Her contact number is [phone].    Please note, this message was sent on the patient's behalf as a courtesy. Please do not reply to this message as this mailbox is not routinely monitored. Thanks.     Lila

## 2023-09-01 NOTE — PROGRESS NOTES
"Phoned patient in response to reply of "3" to post-discharge texting tracker. Ms. Subramanian states that her pain is being well controlled by alternating Tylenol and ibuprofen. She states that she has only had to take 1 oxycodone and that was because she needed to rest. She has not taken any oxycodone since. She just wanted to let us know. She also mentioned that there is bruising by the incision and would like a nurse to call her back to discuss further. She would like to know if that is normal. Sent patient's contact info to team members with request for nurse call back. Also sent message to the office of Dr. Jackson with her request for a call back to discuss further.  "

## 2023-09-04 ENCOUNTER — PATIENT MESSAGE (OUTPATIENT)
Dept: GYNECOLOGIC ONCOLOGY | Facility: CLINIC | Age: 73
End: 2023-09-04
Payer: MEDICARE

## 2023-09-07 ENCOUNTER — PATIENT MESSAGE (OUTPATIENT)
Dept: FAMILY MEDICINE | Facility: CLINIC | Age: 73
End: 2023-09-07
Payer: MEDICARE

## 2023-09-07 ENCOUNTER — ON-DEMAND VIRTUAL (OUTPATIENT)
Dept: URGENT CARE | Facility: CLINIC | Age: 73
End: 2023-09-07
Payer: MEDICARE

## 2023-09-07 ENCOUNTER — OFFICE VISIT (OUTPATIENT)
Dept: FAMILY MEDICINE | Facility: CLINIC | Age: 73
End: 2023-09-07
Payer: MEDICARE

## 2023-09-07 VITALS
DIASTOLIC BLOOD PRESSURE: 52 MMHG | HEART RATE: 60 BPM | TEMPERATURE: 99 F | HEIGHT: 66 IN | WEIGHT: 189.25 LBS | SYSTOLIC BLOOD PRESSURE: 100 MMHG | OXYGEN SATURATION: 96 % | BODY MASS INDEX: 30.41 KG/M2

## 2023-09-07 DIAGNOSIS — L50.9 URTICARIA: Primary | ICD-10-CM

## 2023-09-07 DIAGNOSIS — R21 RASH: Primary | ICD-10-CM

## 2023-09-07 PROCEDURE — 1126F PR PAIN SEVERITY QUANTIFIED, NO PAIN PRESENT: ICD-10-PCS | Mod: HCNC,CPTII,S$GLB, | Performed by: INTERNAL MEDICINE

## 2023-09-07 PROCEDURE — 99999 PR PBB SHADOW E&M-EST. PATIENT-LVL IV: CPT | Mod: PBBFAC,HCNC,, | Performed by: INTERNAL MEDICINE

## 2023-09-07 PROCEDURE — 3078F PR MOST RECENT DIASTOLIC BLOOD PRESSURE < 80 MM HG: ICD-10-PCS | Mod: HCNC,CPTII,S$GLB, | Performed by: INTERNAL MEDICINE

## 2023-09-07 PROCEDURE — 1126F AMNT PAIN NOTED NONE PRSNT: CPT | Mod: HCNC,CPTII,S$GLB, | Performed by: INTERNAL MEDICINE

## 2023-09-07 PROCEDURE — 1159F MED LIST DOCD IN RCRD: CPT | Mod: HCNC,CPTII,S$GLB, | Performed by: INTERNAL MEDICINE

## 2023-09-07 PROCEDURE — 1160F PR REVIEW ALL MEDS BY PRESCRIBER/CLIN PHARMACIST DOCUMENTED: ICD-10-PCS | Mod: HCNC,CPTII,S$GLB, | Performed by: INTERNAL MEDICINE

## 2023-09-07 PROCEDURE — 3288F FALL RISK ASSESSMENT DOCD: CPT | Mod: HCNC,CPTII,S$GLB, | Performed by: INTERNAL MEDICINE

## 2023-09-07 PROCEDURE — 3288F PR FALLS RISK ASSESSMENT DOCUMENTED: ICD-10-PCS | Mod: HCNC,CPTII,S$GLB, | Performed by: INTERNAL MEDICINE

## 2023-09-07 PROCEDURE — 3074F PR MOST RECENT SYSTOLIC BLOOD PRESSURE < 130 MM HG: ICD-10-PCS | Mod: HCNC,CPTII,S$GLB, | Performed by: INTERNAL MEDICINE

## 2023-09-07 PROCEDURE — 99212 OFFICE O/P EST SF 10 MIN: CPT | Mod: 95,,, | Performed by: INTERNAL MEDICINE

## 2023-09-07 PROCEDURE — 3074F SYST BP LT 130 MM HG: CPT | Mod: HCNC,CPTII,S$GLB, | Performed by: INTERNAL MEDICINE

## 2023-09-07 PROCEDURE — 3044F HG A1C LEVEL LT 7.0%: CPT | Mod: HCNC,CPTII,S$GLB, | Performed by: INTERNAL MEDICINE

## 2023-09-07 PROCEDURE — 3008F PR BODY MASS INDEX (BMI) DOCUMENTED: ICD-10-PCS | Mod: HCNC,CPTII,S$GLB, | Performed by: INTERNAL MEDICINE

## 2023-09-07 PROCEDURE — 3044F PR MOST RECENT HEMOGLOBIN A1C LEVEL <7.0%: ICD-10-PCS | Mod: HCNC,CPTII,S$GLB, | Performed by: INTERNAL MEDICINE

## 2023-09-07 PROCEDURE — 99212 PR OFFICE/OUTPT VISIT, EST, LEVL II, 10-19 MIN: ICD-10-PCS | Mod: 95,,, | Performed by: INTERNAL MEDICINE

## 2023-09-07 PROCEDURE — 1101F PT FALLS ASSESS-DOCD LE1/YR: CPT | Mod: HCNC,CPTII,S$GLB, | Performed by: INTERNAL MEDICINE

## 2023-09-07 PROCEDURE — 99213 PR OFFICE/OUTPT VISIT, EST, LEVL III, 20-29 MIN: ICD-10-PCS | Mod: HCNC,S$GLB,, | Performed by: INTERNAL MEDICINE

## 2023-09-07 PROCEDURE — 99999 PR PBB SHADOW E&M-EST. PATIENT-LVL IV: ICD-10-PCS | Mod: PBBFAC,HCNC,, | Performed by: INTERNAL MEDICINE

## 2023-09-07 PROCEDURE — 1111F DSCHRG MED/CURRENT MED MERGE: CPT | Mod: HCNC,CPTII,S$GLB, | Performed by: INTERNAL MEDICINE

## 2023-09-07 PROCEDURE — 1101F PR PT FALLS ASSESS DOC 0-1 FALLS W/OUT INJ PAST YR: ICD-10-PCS | Mod: HCNC,CPTII,S$GLB, | Performed by: INTERNAL MEDICINE

## 2023-09-07 PROCEDURE — 3078F DIAST BP <80 MM HG: CPT | Mod: HCNC,CPTII,S$GLB, | Performed by: INTERNAL MEDICINE

## 2023-09-07 PROCEDURE — 99213 OFFICE O/P EST LOW 20 MIN: CPT | Mod: HCNC,S$GLB,, | Performed by: INTERNAL MEDICINE

## 2023-09-07 PROCEDURE — 1111F PR DISCHARGE MEDS RECONCILED W/ CURRENT OUTPATIENT MED LIST: ICD-10-PCS | Mod: HCNC,CPTII,S$GLB, | Performed by: INTERNAL MEDICINE

## 2023-09-07 PROCEDURE — 3008F BODY MASS INDEX DOCD: CPT | Mod: HCNC,CPTII,S$GLB, | Performed by: INTERNAL MEDICINE

## 2023-09-07 PROCEDURE — 1160F RVW MEDS BY RX/DR IN RCRD: CPT | Mod: HCNC,CPTII,S$GLB, | Performed by: INTERNAL MEDICINE

## 2023-09-07 PROCEDURE — 1159F PR MEDICATION LIST DOCUMENTED IN MEDICAL RECORD: ICD-10-PCS | Mod: HCNC,CPTII,S$GLB, | Performed by: INTERNAL MEDICINE

## 2023-09-07 RX ORDER — TRIAMCINOLONE ACETONIDE 1 MG/G
CREAM TOPICAL 2 TIMES DAILY
Qty: 45 G | Refills: 0 | Status: SHIPPED | OUTPATIENT
Start: 2023-09-07 | End: 2023-10-02

## 2023-09-07 RX ORDER — TRIAMCINOLONE ACETONIDE 1 MG/G
CREAM TOPICAL 2 TIMES DAILY
Qty: 45 G | Refills: 0 | Status: SHIPPED | OUTPATIENT
Start: 2023-09-07 | End: 2023-09-07 | Stop reason: SDUPTHER

## 2023-09-07 RX ORDER — METHYLPREDNISOLONE 4 MG/1
TABLET ORAL
Qty: 1 EACH | Refills: 0 | Status: SHIPPED | OUTPATIENT
Start: 2023-09-07 | End: 2023-09-07 | Stop reason: SDUPTHER

## 2023-09-07 RX ORDER — METHYLPREDNISOLONE 4 MG/1
TABLET ORAL
Qty: 1 EACH | Refills: 0 | Status: SHIPPED | OUTPATIENT
Start: 2023-09-07 | End: 2023-10-02

## 2023-09-07 NOTE — PROGRESS NOTES
This note was created by combination of typed  and M-Modal dictation.  Transcription errors may be present.  If there are any questions, please contact me.    Assessment and Plan:   Assessment and Plan    Urticaria  -localized over the abdomen.  Does not look like cellulitis, does not look like fungal, do not think this is viral   She is been using Hibiclens only on her abdomen, alternating every other day with the shower.  Given location I suspect this maybe due to hyper cleanse.    She is been using ibuprofen alternating with Tylenol.  Typically does not use ibuprofen.  The localized involvement suggests more hyper cleanse than ibuprofen.    She only took oxycodone last Wednesday and then took 1 last night after the itching started so this is not due to the oxycodone  Symptomatic treatment with topical triamcinolone.  If insufficient, prescription for Medrol Dosepak sent to pharmacy  -     Discontinue: triamcinolone acetonide 0.1% (KENALOG) 0.1 % cream; Apply topically 2 (two) times daily. for 14 days  Dispense: 45 g; Refill: 0  -     Discontinue: methylPREDNISolone (MEDROL DOSEPACK) 4 mg tablet; use as directed  Dispense: 1 each; Refill: 0  -     methylPREDNISolone (MEDROL DOSEPACK) 4 mg tablet; use as directed  Dispense: 1 each; Refill: 0  -     triamcinolone acetonide 0.1% (KENALOG) 0.1 % cream; Apply topically 2 (two) times daily. for 14 days  Dispense: 45 g; Refill: 0             Medications Discontinued During This Encounter   Medication Reason    ibuprofen (ADVIL,MOTRIN) 600 MG tablet     triamcinolone acetonide 0.1% (KENALOG) 0.1 % cream Reorder    methylPREDNISolone (MEDROL DOSEPACK) 4 mg tablet Reorder       meds sent this encounter:  Medications Ordered This Encounter   Medications    methylPREDNISolone (MEDROL DOSEPACK) 4 mg tablet     Sig: use as directed     Dispense:  1 each     Refill:  0    triamcinolone acetonide 0.1% (KENALOG) 0.1 % cream     Sig: Apply topically 2 (two) times daily.  for 14 days     Dispense:  45 g     Refill:  0         Follow Up:   Future Appointments   Date Time Provider Department Center   9/7/2023  2:20 PM Ritesh Hogue MD [facility] MED [place]   9/14/2023  9:45 AM Berry Jackson MD [facility] GYN ONC [place]   10/2/2023 10:30 [facility] [place]         Subjective:   Subjective   Chief Complaint   Patient presents with    Rash       HPI  Lenore is a 73 y.o. female.    Social History     Tobacco Use    Smoking status: Never    Smokeless tobacco: Never   Substance Use Topics    Alcohol use: Yes     Alcohol/week: 7.0 standard drinks of alcohol     Types: 7 Glasses of wine per week     Comment: occasionally          Social History     Social History Narrative    Not on file       Patient's last menstrual period was 04/09/2010.    Last appointment with this clinic was 7/12/2023. Last visit with me Visit date not found   To summarize last visit and events leading up to today:  Depression and anxiety   8/29/23 TLH/BSO for postmenopausal bleeding    Today's visit:  Please note: Chronic medical problems that are stable but are being addressed at this visit may not be listed/documented here, but may be addressed in more detail in the Assessment and Plan section.   Below are salient features of chronic medical conditions, or new/acute conditions and their details.    She is here accompanied by her .    She had her hysterectomy done last week.    Last Wednesday, over week ago, she took a dose of oxycodone.  Since then she had not taken it.    She is been washing her abdomen/incision area with Hibiclens alternating every other day with shower.    Yesterday night she started getting itching on her abdomen.  Interrupted her sleep as she was scratching a lot   Woke this morning with a rash over her abdomen, mostly over the sides.  She had what they suspect was a superficial blood vessel that ruptured with bruising on the left lateral abdomen.  No  accompanying fevers or chills.  No aches or pains.  Is taking her Lexapro and is taking her allergy medicine.  She is been alternating Tylenol with ibuprofen.  Normally she takes Tylenol and really does not take ibuprofen.  Not taking topical estrogen cream  Lives with , he has no rash. No pets    Patient Care Team:  Newton Allen MD as PCP - General (Family Medicine)  Edilia Tom MA (Inactive) as Care Coordinator      Patient Active Problem List    Diagnosis Date Noted    S/P RA-TLH/BSO/SLNB 08/29/2023    Endometrial cancer 08/15/2023     7/28/23: EMB FIGO G1 endometrioid endometrial carcinoma      HERVE (obstructive sleep apnea) 05/03/2017    Chronic rhinitis 07/31/2015    Oral herpes simplex, not currently active 07/31/2015    Primary osteoarthritis involving multiple joints 07/31/2015    Class 1 obesity due to excess calories with serious comorbidity in adult 07/31/2015    Family history of diabetes mellitus 07/31/2015    Depression with anxiety        PAST MEDICAL PROBLEMS, PAST SURGICAL HISTORY: please see relevant portions of the electronic medical record    ALLERGIES AND MEDICATIONS: updated and reviewed.  Medication List with Changes/Refills   Current Medications    ACETAMINOPHEN (TYLENOL) 650 MG TBSR    Take 1 tablet (650 mg total) by mouth every 6 (six) hours as needed.    ALBUTEROL (PROVENTIL/VENTOLIN HFA) 90 MCG/ACTUATION INHALER    Inhale 2 puffs into the lungs every 6 (six) hours as needed for Wheezing. Rescue    ASPIRIN (ECOTRIN) 81 MG EC TABLET    Take 81 mg by mouth once daily.    CALCIUM CARBONATE/VITAMIN D3 (CALCIUM WITH VITAMIN D3 ORAL)    Take by mouth.    CETIRIZINE (ZYRTEC) 10 MG TABLET    TAKE 1 TABLET(10 MG) BY MOUTH EVERY DAY    ESCITALOPRAM OXALATE (LEXAPRO) 20 MG TABLET    Take 1 tablet (20 mg total) by mouth once daily.    ESTRADIOL (ESTRACE) 0.01 % (0.1 MG/GRAM) VAGINAL CREAM    Place 0.5 g vaginally twice a week. Insert 0.5grams intravaginally twice weekly    FLAXSEED OIL  "ORAL    Take by mouth.    FLUTICASONE PROPIONATE (FLONASE) 50 MCG/ACTUATION NASAL SPRAY    USE 1 TO 2 SPRAYS IN EACH NOSTRIL ONCE DAILY    GLUCOSAMINE-CHONDROITIN 500-400 MG TABLET    Take 1 tablet by mouth 3 (three) times daily.    IBUPROFEN (ADVIL,MOTRIN) 600 MG TABLET    Take 1 tablet (600 mg total) by mouth every 6 (six) hours as needed for Pain.    OMEGA-3 FATTY ACIDS/FISH OIL (FISH OIL-OMEGA-3 FATTY ACIDS) 300-1,000 MG CAPSULE    Take by mouth once daily.    OXYCODONE (ROXICODONE) 5 MG IMMEDIATE RELEASE TABLET    Take 1 tablet (5 mg total) by mouth every 4 (four) hours as needed for Pain.    SENNA 8.6 MG TABLET    Take 1 tablet by mouth 2 (two) times daily as needed for Constipation.    VALACYCLOVIR (VALTREX) 1000 MG TABLET    Take 1 tablet (1,000 mg total) by mouth daily as needed.    VIT A/C/E AC/ZNOX/CUPRIC OXIDE (EYE VITAMIN AND MINERALS ORAL)    Take by mouth.         Objective:   Objective   Physical Exam   Vitals:    09/07/23 1413   BP: (!) 100/52   Pulse: 60   Temp: 98.9 °F (37.2 °C)   TempSrc: Oral   SpO2: 96%   Weight: 85.9 kg (189 lb 4.2 oz)   Height: 5' 6" (1.676 m)    Body mass index is 30.55 kg/m².            Physical Exam  Constitutional:       General: She is not in acute distress.     Appearance: She is well-developed.   Eyes:      Extraocular Movements: Extraocular movements intact.   Cardiovascular:      Rate and Rhythm: Normal rate and regular rhythm.      Heart sounds: Normal heart sounds. No murmur heard.  Pulmonary:      Effort: Pulmonary effort is normal.      Breath sounds: Normal breath sounds.   Abdominal:      General: There is no distension.      Tenderness: There is no abdominal tenderness. There is no guarding.      Comments: The incision sites are clean dry and intact without drainage.  The rash does seem to cluster somewhat on the edges   Musculoskeletal:         General: Normal range of motion.      Right lower leg: No edema.      Left lower leg: No edema.   Skin:     General: " Skin is warm and dry.      Findings: Rash present.      Comments: Please see the photograph-diffuse blanching macular rash, spanning both sides, seems to be particularly noticeable on the edges of the abdomen.  Does seem to cluster some around the incision sites.  No induration, nontender no fluctuance.  No pus no vesicles.  Nondermatomal.  Area of ecchymosis on the left lateral quadrant   The rash does seem to extend to the proximal thighs just past the inguinal fold   Neurological:      Mental Status: She is alert and oriented to person, place, and time.      Coordination: Coordination normal.   Psychiatric:         Behavior: Behavior normal.         Thought Content: Thought content normal.

## 2023-09-07 NOTE — PROGRESS NOTES
Subjective:      Patient ID: Lenore Subramanian is a 73 y.o. female.    Vitals:  vitals were not taken for this visit.     Chief Complaint: Rash      Visit Type: TELE AUDIOVISUAL    Present with the patient at the time of consultation: TELEMED PRESENT WITH PATIENT: None    Past Medical History:   Diagnosis Date    Anxiety     Asthma     as a child    Endometrial cancer 8/15/2023    Mental disorder     Depression    HERVE (obstructive sleep apnea)      Past Surgical History:   Procedure Laterality Date    COLONOSCOPY N/A 10/25/2016    Procedure: COLONOSCOPY;  Surgeon: JANINA Dominguez MD;  Location: [address]);  Service: Endoscopy;  Laterality: N/A;    LYMPH NODE BIOPSY N/A 8/29/2023    Procedure: BIOPSY, LYMPH NODE;  Surgeon: Berry Jackson MD;  Location: [facility];  Service: OB/GYN;  Laterality: N/A;    LYSIS OF ADHESIONS OF URETER Left 8/29/2023    Procedure: URETEROLYSIS, ROBOTIC;  Surgeon: Berry Jackson MD;  Location: [facility];  Service: OB/GYN;  Laterality: Left;    MAPPING, LYMPH NODE, SENTINEL N/A 8/29/2023    Procedure: MAPPING, LYMPH NODE, SENTINEL;  Surgeon: Berry Jackson MD;  Location: [facility];  Service: OB/GYN;  Laterality: N/A;    ROBOT-ASSISTED LAPAROSCOPIC ABDOMINAL HYSTERECTOMY USING DA JUAN XI N/A 8/29/2023    Procedure: XI ROBOTIC HYSTERECTOMY;  Surgeon: Berry Jackson MD;  Location: [facility];  Service: OB/GYN;  Laterality: N/A;  3 hr case / MEDICARE COVERAGE MUST BE SURGERY ADMIT    ROBOT-ASSISTED LAPAROSCOPIC SALPINGO-OOPHORECTOMY USING DA JUAN XI Bilateral 8/29/2023    Procedure: XI ROBOTIC SALPINGO-OOPHORECTOMY;  Surgeon: Berry Jackson MD;  Location: [facility];  Service: OB/GYN;  Laterality: Bilateral;    TONSILLECTOMY  1958    TUBAL LIGATION  1979    PP BTL     Review of patient's allergies indicates:   Allergen Reactions    Poison ivy [vit e-nonoxynol 9-aloe vera] Rash     Current Outpatient Medications on File Prior to Visit   Medication Sig Dispense  Refill    acetaminophen (TYLENOL) 650 MG TbSR Take 1 tablet (650 mg total) by mouth every 6 (six) hours as needed. 30 tablet 3    albuterol (PROVENTIL/VENTOLIN HFA) 90 mcg/actuation inhaler Inhale 2 puffs into the lungs every 6 (six) hours as needed for Wheezing. Rescue 18 g 0    aspirin (ECOTRIN) 81 MG EC tablet Take 81 mg by mouth once daily.      calcium carbonate/vitamin D3 (CALCIUM WITH VITAMIN D3 ORAL) Take by mouth.      cetirizine (ZYRTEC) 10 MG tablet TAKE 1 TABLET(10 MG) BY MOUTH EVERY DAY 90 tablet 0    EScitalopram oxalate (LEXAPRO) 20 MG tablet Take 1 tablet (20 mg total) by mouth once daily. 90 tablet 3    estradiol (ESTRACE) 0.01 % (0.1 mg/gram) vaginal cream Place 0.5 g vaginally twice a week. Insert 0.5grams intravaginally twice weekly 42 g 0    FLAXSEED OIL ORAL Take by mouth.      fluticasone propionate (FLONASE) 50 mcg/actuation nasal spray USE 1 TO 2 SPRAYS IN EACH NOSTRIL ONCE DAILY 48 g 5    glucosamine-chondroitin 500-400 mg tablet Take 1 tablet by mouth 3 (three) times daily.      ibuprofen (ADVIL,MOTRIN) 600 MG tablet Take 1 tablet (600 mg total) by mouth every 6 (six) hours as needed for Pain. 30 tablet 3    omega-3 fatty acids/fish oil (FISH OIL-OMEGA-3 FATTY ACIDS) 300-1,000 mg capsule Take by mouth once daily.      oxyCODONE (ROXICODONE) 5 MG immediate release tablet Take 1 tablet (5 mg total) by mouth every 4 (four) hours as needed for Pain. 10 tablet 0    SENNA 8.6 mg tablet Take 1 tablet by mouth 2 (two) times daily as needed for Constipation. 30 tablet 3    valACYclovir (VALTREX) 1000 MG tablet Take 1 tablet (1,000 mg total) by mouth daily as needed. 30 tablet 6    vit A/C/E ac/ZnOx/cupric oxide (EYE VITAMIN AND MINERALS ORAL) Take by mouth.       No current facility-administered medications on file prior to visit.     Family History   Problem Relation Age of Onset    Hypertension Mother     Multiple myeloma Father     Colon cancer Sister 61    Hypertrophic cardiomyopathy Son      Breast cancer Maternal Aunt 72    Breast cancer Paternal Aunt 58    Diabetes Paternal Grandmother     Heart disease Neg Hx     Stroke Neg Hx     Ovarian cancer Neg Hx     Melanoma Neg Hx            Ohs Peq Odvv Intake    9/7/2023 12:52 PM CDT - Filed by Patient   Describe your reason for todays visit Itching occurred on abdomen 2 day ago, rash broke out during the last night. Intense itching, requesting relief,   What is your current physical address in the event of a medical emergency? [id number], [place]. [phone]   Are you able to take your vital signs? No   Please attach any relevant images or files          In [place] via video conference.     74 y/o woman with a history of a hysterectomy 1 week ago. She now has a rash on her abdomen that has spread rapidly from below her breast to her incision line. The rash is very itchy. She says it is on both sides (but it looks most prominent on one side). No fever.     Rash  Pertinent negatives include no fever.       Constitution: Negative for fever.   Skin:  Positive for rash and erythema.   Allergic/Immunologic: Positive for itching.        Objective:   The physical exam was conducted virtually.  Physical Exam   Constitutional: She does not appear ill. No distress.   Neurological: She is alert.   Skin: erythema   The rash is extensive from under her breast to her surgery incision line. It looks allergic but also it appears to be along dermatomal lines on that side? This could be zoster like versus hives.   No other pertinent findings.     Assessment:     1. Rash        Plan:       Rash    Since she just had surgery and the rash begins at her incision site and spreads upward I think she needs to go in and be seen in person. I cannot really tell if this is hives versus a possible zoster situation, which could have been triggered by her recent surgery. Someone needs to examine her in person to differentiate the two and be sure that the incision area is not  a concern. The rash is very itchy, which could be either problem. I recommend you call your primary or if they cannot see you go into urgent care today.

## 2023-09-08 LAB
FINAL PATHOLOGIC DIAGNOSIS: NORMAL
GROSS: NORMAL
Lab: NORMAL
MICROSCOPIC EXAM: NORMAL

## 2023-09-14 ENCOUNTER — OFFICE VISIT (OUTPATIENT)
Dept: GYNECOLOGIC ONCOLOGY | Facility: CLINIC | Age: 73
End: 2023-09-14
Payer: MEDICARE

## 2023-09-14 VITALS
DIASTOLIC BLOOD PRESSURE: 68 MMHG | HEIGHT: 66 IN | WEIGHT: 188.25 LBS | HEART RATE: 55 BPM | BODY MASS INDEX: 30.25 KG/M2 | SYSTOLIC BLOOD PRESSURE: 124 MMHG

## 2023-09-14 DIAGNOSIS — C54.1 ENDOMETRIAL CANCER: Primary | ICD-10-CM

## 2023-09-14 PROCEDURE — 3078F DIAST BP <80 MM HG: CPT | Mod: HCNC,CPTII,S$GLB, | Performed by: STUDENT IN AN ORGANIZED HEALTH CARE EDUCATION/TRAINING PROGRAM

## 2023-09-14 PROCEDURE — 3044F PR MOST RECENT HEMOGLOBIN A1C LEVEL <7.0%: ICD-10-PCS | Mod: HCNC,CPTII,S$GLB, | Performed by: STUDENT IN AN ORGANIZED HEALTH CARE EDUCATION/TRAINING PROGRAM

## 2023-09-14 PROCEDURE — 1159F PR MEDICATION LIST DOCUMENTED IN MEDICAL RECORD: ICD-10-PCS | Mod: HCNC,CPTII,S$GLB, | Performed by: STUDENT IN AN ORGANIZED HEALTH CARE EDUCATION/TRAINING PROGRAM

## 2023-09-14 PROCEDURE — 3288F PR FALLS RISK ASSESSMENT DOCUMENTED: ICD-10-PCS | Mod: HCNC,CPTII,S$GLB, | Performed by: STUDENT IN AN ORGANIZED HEALTH CARE EDUCATION/TRAINING PROGRAM

## 2023-09-14 PROCEDURE — 99214 OFFICE O/P EST MOD 30 MIN: CPT | Mod: 24,HCNC,S$GLB, | Performed by: STUDENT IN AN ORGANIZED HEALTH CARE EDUCATION/TRAINING PROGRAM

## 2023-09-14 PROCEDURE — 1101F PR PT FALLS ASSESS DOC 0-1 FALLS W/OUT INJ PAST YR: ICD-10-PCS | Mod: HCNC,CPTII,S$GLB, | Performed by: STUDENT IN AN ORGANIZED HEALTH CARE EDUCATION/TRAINING PROGRAM

## 2023-09-14 PROCEDURE — 99214 PR OFFICE/OUTPT VISIT, EST, LEVL IV, 30-39 MIN: ICD-10-PCS | Mod: 24,HCNC,S$GLB, | Performed by: STUDENT IN AN ORGANIZED HEALTH CARE EDUCATION/TRAINING PROGRAM

## 2023-09-14 PROCEDURE — 1126F PR PAIN SEVERITY QUANTIFIED, NO PAIN PRESENT: ICD-10-PCS | Mod: HCNC,CPTII,S$GLB, | Performed by: STUDENT IN AN ORGANIZED HEALTH CARE EDUCATION/TRAINING PROGRAM

## 2023-09-14 PROCEDURE — 3008F PR BODY MASS INDEX (BMI) DOCUMENTED: ICD-10-PCS | Mod: HCNC,CPTII,S$GLB, | Performed by: STUDENT IN AN ORGANIZED HEALTH CARE EDUCATION/TRAINING PROGRAM

## 2023-09-14 PROCEDURE — 1159F MED LIST DOCD IN RCRD: CPT | Mod: HCNC,CPTII,S$GLB, | Performed by: STUDENT IN AN ORGANIZED HEALTH CARE EDUCATION/TRAINING PROGRAM

## 2023-09-14 PROCEDURE — 99999 PR PBB SHADOW E&M-EST. PATIENT-LVL III: ICD-10-PCS | Mod: PBBFAC,HCNC,, | Performed by: STUDENT IN AN ORGANIZED HEALTH CARE EDUCATION/TRAINING PROGRAM

## 2023-09-14 PROCEDURE — 1126F AMNT PAIN NOTED NONE PRSNT: CPT | Mod: HCNC,CPTII,S$GLB, | Performed by: STUDENT IN AN ORGANIZED HEALTH CARE EDUCATION/TRAINING PROGRAM

## 2023-09-14 PROCEDURE — 3074F PR MOST RECENT SYSTOLIC BLOOD PRESSURE < 130 MM HG: ICD-10-PCS | Mod: HCNC,CPTII,S$GLB, | Performed by: STUDENT IN AN ORGANIZED HEALTH CARE EDUCATION/TRAINING PROGRAM

## 2023-09-14 PROCEDURE — 3078F PR MOST RECENT DIASTOLIC BLOOD PRESSURE < 80 MM HG: ICD-10-PCS | Mod: HCNC,CPTII,S$GLB, | Performed by: STUDENT IN AN ORGANIZED HEALTH CARE EDUCATION/TRAINING PROGRAM

## 2023-09-14 PROCEDURE — 1111F PR DISCHARGE MEDS RECONCILED W/ CURRENT OUTPATIENT MED LIST: ICD-10-PCS | Mod: HCNC,CPTII,S$GLB, | Performed by: STUDENT IN AN ORGANIZED HEALTH CARE EDUCATION/TRAINING PROGRAM

## 2023-09-14 PROCEDURE — 99999 PR PBB SHADOW E&M-EST. PATIENT-LVL III: CPT | Mod: PBBFAC,HCNC,, | Performed by: STUDENT IN AN ORGANIZED HEALTH CARE EDUCATION/TRAINING PROGRAM

## 2023-09-14 PROCEDURE — 3288F FALL RISK ASSESSMENT DOCD: CPT | Mod: HCNC,CPTII,S$GLB, | Performed by: STUDENT IN AN ORGANIZED HEALTH CARE EDUCATION/TRAINING PROGRAM

## 2023-09-14 PROCEDURE — 1111F DSCHRG MED/CURRENT MED MERGE: CPT | Mod: HCNC,CPTII,S$GLB, | Performed by: STUDENT IN AN ORGANIZED HEALTH CARE EDUCATION/TRAINING PROGRAM

## 2023-09-14 PROCEDURE — 1101F PT FALLS ASSESS-DOCD LE1/YR: CPT | Mod: HCNC,CPTII,S$GLB, | Performed by: STUDENT IN AN ORGANIZED HEALTH CARE EDUCATION/TRAINING PROGRAM

## 2023-09-14 PROCEDURE — 3008F BODY MASS INDEX DOCD: CPT | Mod: HCNC,CPTII,S$GLB, | Performed by: STUDENT IN AN ORGANIZED HEALTH CARE EDUCATION/TRAINING PROGRAM

## 2023-09-14 PROCEDURE — 3074F SYST BP LT 130 MM HG: CPT | Mod: HCNC,CPTII,S$GLB, | Performed by: STUDENT IN AN ORGANIZED HEALTH CARE EDUCATION/TRAINING PROGRAM

## 2023-09-14 PROCEDURE — 3044F HG A1C LEVEL LT 7.0%: CPT | Mod: HCNC,CPTII,S$GLB, | Performed by: STUDENT IN AN ORGANIZED HEALTH CARE EDUCATION/TRAINING PROGRAM

## 2023-09-14 NOTE — PROGRESS NOTES
Referring Provider:  No referring provider defined for this encounter.   Subjective:      Patient ID: Lenore Subramanian is a 73 y.o. female.    Chief Complaint: No chief complaint on file.    Problem List Items Addressed This Visit          Oncology    Endometrial cancer - Primary    Overview     7/28/23: EMB FIGO G1 endometrioid endometrial carcinoma  8/29/23: RA-TLH BSO SLN. Path IB G1 endometrioid endometrial carcinoma (0/7 SLN, No LVSI, 73% MI, pMMR, ER/IL +)    Adjuvant therapy  Plan for vaginal brachytherapy         Relevant Orders    Ambulatory referral/consult to Radiation Oncology      HPI Bruising at left lateral port site has dramatically improved. Reports some fullness and pressure in that area. Denies other symptoms    Review of Systems   Constitutional:  Negative for chills, fatigue and fever.   Respiratory:  Negative for cough and shortness of breath.    Cardiovascular:  Negative for chest pain.   Gastrointestinal:  Negative for abdominal distention, abdominal pain, constipation and diarrhea.   Genitourinary:  Negative for dysuria, pelvic pain and vaginal bleeding.   Musculoskeletal:  Negative for back pain.   Psychiatric/Behavioral:  Negative for dysphoric mood. The patient is not nervous/anxious.      Past Medical History:   Diagnosis Date    Anxiety     Asthma     as a child    Endometrial cancer 8/15/2023    Mental disorder     Depression    HERVE (obstructive sleep apnea)       Past Surgical History:   Procedure Laterality Date    COLONOSCOPY N/A 10/25/2016    Procedure: COLONOSCOPY;  Surgeon: JANINA Dominguez MD;  Location: [address];  Service: Endoscopy;  Laterality: N/A;    LYMPH NODE BIOPSY N/A 8/29/2023    Procedure: BIOPSY, LYMPH NODE;  Surgeon: Berry Jackson MD;  Location: [facility];  Service: OB/GYN;  Laterality: N/A;    LYSIS OF ADHESIONS OF URETER Left 8/29/2023    Procedure: URETEROLYSIS, ROBOTIC;  Surgeon: Berry Jackson MD;  Location: [facility];  Service: OB/GYN;   Laterality: Left;    MAPPING, LYMPH NODE, SENTINEL N/A 8/29/2023    Procedure: MAPPING, LYMPH NODE, SENTINEL;  Surgeon: Berry Jackson MD;  Location: [facility];  Service: OB/GYN;  Laterality: N/A;    ROBOT-ASSISTED LAPAROSCOPIC ABDOMINAL HYSTERECTOMY USING DA JUAN XI N/A 8/29/2023    Procedure: XI ROBOTIC HYSTERECTOMY;  Surgeon: Berry Jackson MD;  Location: [facility] OR;  Service: OB/GYN;  Laterality: N/A;  3 hr case / MEDICARE COVERAGE MUST BE SURGERY ADMIT    ROBOT-ASSISTED LAPAROSCOPIC SALPINGO-OOPHORECTOMY USING DA JUAN XI Bilateral 8/29/2023    Procedure: XI ROBOTIC SALPINGO-OOPHORECTOMY;  Surgeon: Berry Jackson MD;  Location: [facility];  Service: OB/GYN;  Laterality: Bilateral;    TONSILLECTOMY  1958    TUBAL LIGATION  1979    PP BTL      Family History   Problem Relation Age of Onset    Hypertension Mother     Multiple myeloma Father     Colon cancer Sister 61    Hypertrophic cardiomyopathy Son     Breast cancer Maternal Aunt 72    Breast cancer Paternal Aunt 58    Diabetes Paternal Grandmother     Heart disease Neg Hx     Stroke Neg Hx     Ovarian cancer Neg Hx     Melanoma Neg Hx       Social History     Socioeconomic History    Marital status:         Objective:      Vitals:    09/14/23 0948   BP: 124/68   Pulse: (!) 55      Physical Exam  Constitutional:       General: She is not in acute distress.  HENT:      Head: Normocephalic.   Eyes:      Extraocular Movements: Extraocular movements intact.      Conjunctiva/sclera: Conjunctivae normal.   Cardiovascular:      Rate and Rhythm: Normal rate.      Pulses: Normal pulses.   Pulmonary:      Effort: Pulmonary effort is normal. No respiratory distress.      Breath sounds: No wheezing.   Abdominal:      General: There is no distension.      Tenderness: There is no abdominal tenderness. There is no guarding or rebound.   Genitourinary:     Comments: External genitalia normal. Vagina normal. Uterus, cervix, and adnexa surgically absent.  Vaginal cuff intact.   Musculoskeletal:         General: No deformity.   Neurological:      Mental Status: She is alert and oriented to person, place, and time.   Psychiatric:         Mood and Affect: Mood normal.         Behavior: Behavior normal.         Thought Content: Thought content normal.         Lab Results   Component Value Date    WBC 6.45 05/09/2023    HGB 14.7 05/09/2023    HCT 45.1 05/09/2023    MCV 95 05/09/2023     05/09/2023        Assessment:       Endometrial cancer  -     Ambulatory referral/consult to Radiation Oncology; Future; Expected date: 09/21/2023         Plan:       Endometrial carcinoma: Stage IB Grade 1-  We reviewed the eitiology and prognosis for early stage endometrial cancer. We reviewed her pathology report from surgery in detail. Given that she meets high-intermediate risk criteria, we discussed options for adjuvant therapy including vaginal cuff brachytherapy, external beam radiation, or surveillance without adjuvant therapy.  I would recommend vaginal cuff brachytherapy to reduce the risk of vaginal cuff recurrence. We reviewed the expected surveillance schedule with plans for a surveillance visit in 3 months followed by every 6 months for 1 year, and then yearly visits there after.   Referral to radiation oncology for VBT  RTC in 3 months for surveillance.     2. Obesity: recommend diet and exercise to achieve and maintain a healthy weight.     Though this visit took place within the global post op period, counseling today covered issues not related to the surgery, specifically the prognosis of her pathology and the pros and cons of adjuvant treatment including chemotherapy, radiation, or additional surgery.      As part of the medical decision making process I reviewed the notes from her 9/7 telemedicine visit, relevant labs, and pathology report.        Berry Jackson MD

## 2023-09-14 NOTE — Clinical Note
Tho, thanks again for sending Lenore to see me. She is such a sweet lady. Path from her surgery showed a IB G1 endometrial cancer. I'm recommending vaginal cuff brachytherapy to reduce the risk of vaginal recurrence. I'll continue to follow her for surveillance visits.  Please feel free to reach out anytime with questions or referrals, and I will always work to make sure I get patients seen as quickly as possible.  Thanks, Berry Jackson Cell: [phone]

## 2023-09-15 ENCOUNTER — TELEPHONE (OUTPATIENT)
Dept: FAMILY MEDICINE | Facility: CLINIC | Age: 73
End: 2023-09-15
Payer: MEDICARE

## 2023-09-29 ENCOUNTER — TELEPHONE (OUTPATIENT)
Dept: ADMINISTRATIVE | Facility: CLINIC | Age: 73
End: 2023-09-29
Payer: MEDICARE

## 2023-10-02 ENCOUNTER — OFFICE VISIT (OUTPATIENT)
Dept: FAMILY MEDICINE | Facility: CLINIC | Age: 73
End: 2023-10-02
Payer: MEDICARE

## 2023-10-02 VITALS
TEMPERATURE: 98 F | HEIGHT: 66 IN | HEART RATE: 53 BPM | OXYGEN SATURATION: 95 % | SYSTOLIC BLOOD PRESSURE: 124 MMHG | DIASTOLIC BLOOD PRESSURE: 68 MMHG | BODY MASS INDEX: 30.51 KG/M2 | WEIGHT: 189.81 LBS

## 2023-10-02 DIAGNOSIS — F41.8 DEPRESSION WITH ANXIETY: ICD-10-CM

## 2023-10-02 DIAGNOSIS — M15.9 PRIMARY OSTEOARTHRITIS INVOLVING MULTIPLE JOINTS: ICD-10-CM

## 2023-10-02 DIAGNOSIS — C54.1 ENDOMETRIAL CANCER: ICD-10-CM

## 2023-10-02 DIAGNOSIS — B00.2 ORAL HERPES SIMPLEX, NOT CURRENTLY ACTIVE: ICD-10-CM

## 2023-10-02 DIAGNOSIS — J31.0 CHRONIC RHINITIS: ICD-10-CM

## 2023-10-02 DIAGNOSIS — E66.09 CLASS 1 OBESITY DUE TO EXCESS CALORIES WITH SERIOUS COMORBIDITY AND BODY MASS INDEX (BMI) OF 30.0 TO 30.9 IN ADULT: ICD-10-CM

## 2023-10-02 DIAGNOSIS — Z23 FLU VACCINE NEED: ICD-10-CM

## 2023-10-02 DIAGNOSIS — Z00.00 ENCOUNTER FOR PREVENTIVE HEALTH EXAMINATION: Primary | ICD-10-CM

## 2023-10-02 PROCEDURE — 1101F PR PT FALLS ASSESS DOC 0-1 FALLS W/OUT INJ PAST YR: ICD-10-PCS | Mod: HCNC,CPTII,S$GLB, | Performed by: NURSE PRACTITIONER

## 2023-10-02 PROCEDURE — 99999 PR PBB SHADOW E&M-EST. PATIENT-LVL IV: CPT | Mod: PBBFAC,HCNC,, | Performed by: NURSE PRACTITIONER

## 2023-10-02 PROCEDURE — 90694 VACC AIIV4 NO PRSRV 0.5ML IM: CPT | Mod: HCNC,S$GLB,, | Performed by: NURSE PRACTITIONER

## 2023-10-02 PROCEDURE — G0439 PPPS, SUBSEQ VISIT: HCPCS | Mod: HCNC,S$GLB,, | Performed by: NURSE PRACTITIONER

## 2023-10-02 PROCEDURE — 1159F PR MEDICATION LIST DOCUMENTED IN MEDICAL RECORD: ICD-10-PCS | Mod: HCNC,CPTII,S$GLB, | Performed by: NURSE PRACTITIONER

## 2023-10-02 PROCEDURE — 3044F HG A1C LEVEL LT 7.0%: CPT | Mod: HCNC,CPTII,S$GLB, | Performed by: NURSE PRACTITIONER

## 2023-10-02 PROCEDURE — 1170F PR FUNCTIONAL STATUS ASSESSED: ICD-10-PCS | Mod: HCNC,CPTII,S$GLB, | Performed by: NURSE PRACTITIONER

## 2023-10-02 PROCEDURE — 3288F PR FALLS RISK ASSESSMENT DOCUMENTED: ICD-10-PCS | Mod: HCNC,CPTII,S$GLB, | Performed by: NURSE PRACTITIONER

## 2023-10-02 PROCEDURE — 3078F PR MOST RECENT DIASTOLIC BLOOD PRESSURE < 80 MM HG: ICD-10-PCS | Mod: HCNC,CPTII,S$GLB, | Performed by: NURSE PRACTITIONER

## 2023-10-02 PROCEDURE — 3074F PR MOST RECENT SYSTOLIC BLOOD PRESSURE < 130 MM HG: ICD-10-PCS | Mod: HCNC,CPTII,S$GLB, | Performed by: NURSE PRACTITIONER

## 2023-10-02 PROCEDURE — 1160F RVW MEDS BY RX/DR IN RCRD: CPT | Mod: HCNC,CPTII,S$GLB, | Performed by: NURSE PRACTITIONER

## 2023-10-02 PROCEDURE — G0008 ADMIN INFLUENZA VIRUS VAC: HCPCS | Mod: HCNC,S$GLB,, | Performed by: NURSE PRACTITIONER

## 2023-10-02 PROCEDURE — G0439 PR MEDICARE ANNUAL WELLNESS SUBSEQUENT VISIT: ICD-10-PCS | Mod: HCNC,S$GLB,, | Performed by: NURSE PRACTITIONER

## 2023-10-02 PROCEDURE — G0008 FLU VACCINE - QUADRIVALENT - ADJUVANTED: ICD-10-PCS | Mod: HCNC,S$GLB,, | Performed by: NURSE PRACTITIONER

## 2023-10-02 PROCEDURE — 3078F DIAST BP <80 MM HG: CPT | Mod: HCNC,CPTII,S$GLB, | Performed by: NURSE PRACTITIONER

## 2023-10-02 PROCEDURE — 3288F FALL RISK ASSESSMENT DOCD: CPT | Mod: HCNC,CPTII,S$GLB, | Performed by: NURSE PRACTITIONER

## 2023-10-02 PROCEDURE — 1170F FXNL STATUS ASSESSED: CPT | Mod: HCNC,CPTII,S$GLB, | Performed by: NURSE PRACTITIONER

## 2023-10-02 PROCEDURE — 1159F MED LIST DOCD IN RCRD: CPT | Mod: HCNC,CPTII,S$GLB, | Performed by: NURSE PRACTITIONER

## 2023-10-02 PROCEDURE — 90694 FLU VACCINE - QUADRIVALENT - ADJUVANTED: ICD-10-PCS | Mod: HCNC,S$GLB,, | Performed by: NURSE PRACTITIONER

## 2023-10-02 PROCEDURE — 3008F BODY MASS INDEX DOCD: CPT | Mod: HCNC,CPTII,S$GLB, | Performed by: NURSE PRACTITIONER

## 2023-10-02 PROCEDURE — 3008F PR BODY MASS INDEX (BMI) DOCUMENTED: ICD-10-PCS | Mod: HCNC,CPTII,S$GLB, | Performed by: NURSE PRACTITIONER

## 2023-10-02 PROCEDURE — 99999 PR PBB SHADOW E&M-EST. PATIENT-LVL IV: ICD-10-PCS | Mod: PBBFAC,HCNC,, | Performed by: NURSE PRACTITIONER

## 2023-10-02 PROCEDURE — 3044F PR MOST RECENT HEMOGLOBIN A1C LEVEL <7.0%: ICD-10-PCS | Mod: HCNC,CPTII,S$GLB, | Performed by: NURSE PRACTITIONER

## 2023-10-02 PROCEDURE — 1101F PT FALLS ASSESS-DOCD LE1/YR: CPT | Mod: HCNC,CPTII,S$GLB, | Performed by: NURSE PRACTITIONER

## 2023-10-02 PROCEDURE — 1160F PR REVIEW ALL MEDS BY PRESCRIBER/CLIN PHARMACIST DOCUMENTED: ICD-10-PCS | Mod: HCNC,CPTII,S$GLB, | Performed by: NURSE PRACTITIONER

## 2023-10-02 PROCEDURE — 3074F SYST BP LT 130 MM HG: CPT | Mod: HCNC,CPTII,S$GLB, | Performed by: NURSE PRACTITIONER

## 2023-10-02 NOTE — PROGRESS NOTES
"  Lenore Subramanian presented for a  Medicare AWV and comprehensive Health Risk Assessment today. The following components were reviewed and updated:    Medical history  Family History  Social history  Allergies and Current Medications  Health Risk Assessment  Health Maintenance  Care Team       ** See Completed Assessments for Annual Wellness Visit within the encounter summary.**       The following assessments were completed:  Living Situation  CAGE  Depression Screening  Timed Get Up and Go  Whisper Test  Cognitive Function Screening  Nutrition Screening  ADL Screening  PAQ Screening          Vitals:    10/02/23 1030   BP: 124/68   BP Location: Right arm   Patient Position: Sitting   BP Method: Large (Manual)   Pulse: (!) 53   Temp: 98 °F (36.7 °C)   TempSrc: Oral   SpO2: 95%   Weight: 86.1 kg (189 lb 13.1 oz)   Height: 5' 6" (1.676 m)     Body mass index is 30.64 kg/m².  Physical Exam  Vitals and nursing note reviewed.   Constitutional:       Appearance: Normal appearance. She is obese.   Cardiovascular:      Rate and Rhythm: Bradycardia present.      Pulses: Normal pulses.      Heart sounds: Normal heart sounds.   Pulmonary:      Effort: Pulmonary effort is normal.      Breath sounds: Normal breath sounds.   Musculoskeletal:         General: Normal range of motion.   Neurological:      Mental Status: She is alert and oriented to person, place, and time.   Psychiatric:         Mood and Affect: Mood normal.         Behavior: Behavior normal.             Diagnoses and health risks identified today and associated recommendations/orders:    1. Encounter for preventive health examination  Pt was seen today for an Annual Wellness visit. Healthcare maintenance and screening recommendations were discussed and updated as indicated. Return in one year for AWV.    Review current opioid prescriptions:n/a  Screen for potential Substance Use Disorders:n/a    2. Endometrial cancer  The current medical regimen is effective;  " continue present plan and medications.    3. Class 1 obesity due to excess calories with serious comorbidity and body mass index (BMI) of 30.0 to 30.9 in adult  The patient is asked to make an attempt to improve diet and exercise patterns to aid in medical management of this problem.    4. Depression with anxiety  The current medical regimen is effective;  continue present plan and medications.    5. Chronic rhinitis  The current medical regimen is effective;  continue present plan and medications.    6. Oral herpes simplex, not currently active  The current medical regimen is effective;  continue present plan and medications.    7. Primary osteoarthritis involving multiple joints  The current medical regimen is effective;  continue present plan and medications.    8. Flu vaccine need  Seasonal flu discussed. Understanding verbalized.  - Influenza (FLUAD) - Quadrivalent (Adjuvanted) *Preferred* (65+) (PF)      Provided Lenore with a 5-10 year written screening schedule and personal prevention plan. Recommendations were developed using the USPSTF age appropriate recommendations. Education, counseling, and referrals were provided as needed. After Visit Summary printed and given to patient which includes a list of additional screenings\tests needed.    Follow up in about 1 year (around 10/2/2024).    SHANNON Longoria  I offered to discuss advanced care planning, including how to pick a person who would make decisions for you if you were unable to make them for yourself, called a health care power of , and what kind of decisions you might make such as use of life sustaining treatments such as ventilators and tube feeding when faced with a life limiting illness recorded on a living will that they will need to know. (How you want to be cared for as you near the end of your natural life)     X Patient is interested in learning more about how to make advanced directives.  I provided them paperwork and offered to  discuss this with them.

## 2023-10-02 NOTE — PATIENT INSTRUCTIONS
Counseling and Referral of Other Preventative  (Italic type indicates deductible and co-insurance are waived)    Patient Name: Lenore Subramanian  Today's Date: 10/2/2023    Health Maintenance       Date Due Completion Date    COVID-19 Vaccine (4 - Pfizer series) 12/15/2021 10/20/2021    Influenza Vaccine (1) 09/01/2023 12/8/2022    Mammogram 01/06/2024 1/6/2023    Override on 8/18/2017: Done    DEXA Scan 01/12/2024 1/12/2021    Colorectal Cancer Screening 10/25/2026 5/13/2021    TETANUS VACCINE 05/03/2027 5/3/2017    Lipid Panel 05/09/2028 5/9/2023        Orders Placed This Encounter   Procedures    Influenza (FLUAD) - Quadrivalent (Adjuvanted) *Preferred* (65+) (PF)     The following information is provided to all patients.  This information is to help you find resources for any of the problems found today that may be affecting your health:                Living healthy guide: www.[facility].[place].gov      Understanding Diabetes: www.diabetes.org      Eating healthy: www.cdc.gov/healthyweight      CDC home safety checklist: www.cdc.gov/steadi/patient.html      Agency on Aging: www.goea.[place].gov      Alcoholics anonymous (AA): www.aa.org      Physical Activity: www.trinity.nih.gov/tm3rzrw      Tobacco use: www.quitwithusla.org

## 2023-10-02 NOTE — PROGRESS NOTES
Pt tolerated injection well. Pt instructed to wait inside facility for 15 min after injection, pt verbalized understanding.

## 2023-10-12 ENCOUNTER — PATIENT MESSAGE (OUTPATIENT)
Dept: GYNECOLOGIC ONCOLOGY | Facility: CLINIC | Age: 73
End: 2023-10-12
Payer: MEDICARE

## 2023-10-13 ENCOUNTER — TELEPHONE (OUTPATIENT)
Dept: RADIATION ONCOLOGY | Facility: CLINIC | Age: 73
End: 2023-10-13
Payer: MEDICARE

## 2023-10-13 NOTE — TELEPHONE ENCOUNTER
Call to pt to schedule consult in Radiation Oncology. Appt scheduled with Dr Lucero 10/16/23 at 9:00 AM.

## 2023-10-13 NOTE — TELEPHONE ENCOUNTER
----- Message from Aristeo Carty RN sent at 10/12/2023 11:25 AM CDT -----  Good morning,    This patient has a referral to Rad Onc from Dr. Jackson for VBT. Could you all get her scheduled soon? Thanks!!    J'me

## 2023-10-16 ENCOUNTER — OFFICE VISIT (OUTPATIENT)
Dept: RADIATION ONCOLOGY | Facility: CLINIC | Age: 73
End: 2023-10-16
Payer: MEDICARE

## 2023-10-16 VITALS
BODY MASS INDEX: 31.18 KG/M2 | OXYGEN SATURATION: 96 % | TEMPERATURE: 98 F | DIASTOLIC BLOOD PRESSURE: 59 MMHG | HEIGHT: 66 IN | SYSTOLIC BLOOD PRESSURE: 112 MMHG | WEIGHT: 194 LBS | HEART RATE: 52 BPM

## 2023-10-16 DIAGNOSIS — C54.1 ENDOMETRIAL CANCER: Primary | ICD-10-CM

## 2023-10-16 PROCEDURE — 3074F SYST BP LT 130 MM HG: CPT | Mod: HCNC,CPTII,S$GLB, | Performed by: RADIOLOGY

## 2023-10-16 PROCEDURE — 1101F PR PT FALLS ASSESS DOC 0-1 FALLS W/OUT INJ PAST YR: ICD-10-PCS | Mod: HCNC,CPTII,S$GLB, | Performed by: RADIOLOGY

## 2023-10-16 PROCEDURE — 99999 PR PBB SHADOW E&M-EST. PATIENT-LVL III: CPT | Mod: PBBFAC,HCNC,, | Performed by: RADIOLOGY

## 2023-10-16 PROCEDURE — 3044F PR MOST RECENT HEMOGLOBIN A1C LEVEL <7.0%: ICD-10-PCS | Mod: HCNC,CPTII,S$GLB, | Performed by: RADIOLOGY

## 2023-10-16 PROCEDURE — 1126F AMNT PAIN NOTED NONE PRSNT: CPT | Mod: HCNC,CPTII,S$GLB, | Performed by: RADIOLOGY

## 2023-10-16 PROCEDURE — 3044F HG A1C LEVEL LT 7.0%: CPT | Mod: HCNC,CPTII,S$GLB, | Performed by: RADIOLOGY

## 2023-10-16 PROCEDURE — 99205 PR OFFICE/OUTPT VISIT, NEW, LEVL V, 60-74 MIN: ICD-10-PCS | Mod: HCNC,S$GLB,, | Performed by: RADIOLOGY

## 2023-10-16 PROCEDURE — 1101F PT FALLS ASSESS-DOCD LE1/YR: CPT | Mod: HCNC,CPTII,S$GLB, | Performed by: RADIOLOGY

## 2023-10-16 PROCEDURE — 3078F DIAST BP <80 MM HG: CPT | Mod: HCNC,CPTII,S$GLB, | Performed by: RADIOLOGY

## 2023-10-16 PROCEDURE — 3008F BODY MASS INDEX DOCD: CPT | Mod: HCNC,CPTII,S$GLB, | Performed by: RADIOLOGY

## 2023-10-16 PROCEDURE — 3074F PR MOST RECENT SYSTOLIC BLOOD PRESSURE < 130 MM HG: ICD-10-PCS | Mod: HCNC,CPTII,S$GLB, | Performed by: RADIOLOGY

## 2023-10-16 PROCEDURE — 3008F PR BODY MASS INDEX (BMI) DOCUMENTED: ICD-10-PCS | Mod: HCNC,CPTII,S$GLB, | Performed by: RADIOLOGY

## 2023-10-16 PROCEDURE — 99999 PR PBB SHADOW E&M-EST. PATIENT-LVL III: ICD-10-PCS | Mod: PBBFAC,HCNC,, | Performed by: RADIOLOGY

## 2023-10-16 PROCEDURE — 3288F PR FALLS RISK ASSESSMENT DOCUMENTED: ICD-10-PCS | Mod: HCNC,CPTII,S$GLB, | Performed by: RADIOLOGY

## 2023-10-16 PROCEDURE — 3288F FALL RISK ASSESSMENT DOCD: CPT | Mod: HCNC,CPTII,S$GLB, | Performed by: RADIOLOGY

## 2023-10-16 PROCEDURE — 3078F PR MOST RECENT DIASTOLIC BLOOD PRESSURE < 80 MM HG: ICD-10-PCS | Mod: HCNC,CPTII,S$GLB, | Performed by: RADIOLOGY

## 2023-10-16 PROCEDURE — 1126F PR PAIN SEVERITY QUANTIFIED, NO PAIN PRESENT: ICD-10-PCS | Mod: HCNC,CPTII,S$GLB, | Performed by: RADIOLOGY

## 2023-10-16 PROCEDURE — 99205 OFFICE O/P NEW HI 60 MIN: CPT | Mod: HCNC,S$GLB,, | Performed by: RADIOLOGY

## 2023-10-16 NOTE — PROGRESS NOTES
PATIENT IDENTIFICATION:  Patient Name: Lenore Subramanian  MRN: [id number]  : 1950    DIAGNOSIS:  Cancer Staging   Endometrial cancer  Staging form: Corpus Uteri - Carcinoma and Carcinosarcoma, AJCC 8th Edition  - Pathologic stage from 2023: FIGO Stage IB (pT1b, pN0(sn), cM0) - Signed by Vannessa Lucero MD on 10/16/2023      HISTORY OF PRESENT ILLNESS:   The patient is a 73-year-old woman with a FIGO stage IB endometrial cancer.  She is status post TAHBSO and is referred for consideration of vaginal cuff brachytherapy.      The patient presented postmenopausal bleeding.  She underwent endometrial biopsy on 2023 which revealed a FIGO grade 1 endometrial endometrioid adenocarcinoma.    The patient was referred to Dr. Jackson for surgical management.  She was taken to the operating room on 2023 for surgical resection.  Final pathology confirmed the presence of a FIGO grade 1 endometrial endometrioid adenocarcinoma with 73% myometrial invasion.  The tumor measured 3 cm in greatest dimension.  There was no lymphovascular invasion.  Seven pelvic lymph nodes were negative for evidence of metastatic carcinoma.    Oncology History   Endometrial cancer   8/15/2023 Initial Diagnosis    Endometrial cancer     2023 Cancer Staged    Staging form: Corpus Uteri - Carcinoma and Carcinosarcoma, AJCC 8th Edition  - Pathologic stage from 2023: FIGO Stage IB (pT1b, pN0(sn), cM0)     2023 Surgery    Surgeon: Dr. Berry Jackson  Robotic Hysterectomy         REVIEW OF SYSTEMS:   Review of Systems   Constitutional:  Negative for fever, malaise/fatigue and weight loss.   HENT:  Negative for ear pain, hearing loss, sinus pain and sore throat.    Eyes:  Negative for blurred vision, double vision and pain.   Respiratory:  Negative for cough, hemoptysis, shortness of breath and wheezing.    Cardiovascular:  Negative for chest pain, palpitations and leg swelling.   Gastrointestinal:  Negative for  abdominal pain, blood in stool, constipation, diarrhea, heartburn, nausea and vomiting.   Genitourinary:  Negative for dysuria, frequency, hematuria and urgency.   Musculoskeletal:  Negative for back pain and joint pain.   Skin:  Negative for itching and rash.   Neurological:  Negative for tingling, focal weakness, seizures and headaches.   Psychiatric/Behavioral:  Negative for depression. The patient is not nervous/anxious.          PAST MEDICAL HISTORY:  Past Medical History:   Diagnosis Date    Anxiety     Asthma     as a child    Endometrial cancer 8/15/2023    Mental disorder     Depression    HERVE (obstructive sleep apnea)        PAST SURGICAL HISTORY:  Past Surgical History:   Procedure Laterality Date    COLONOSCOPY N/A 10/25/2016    Procedure: COLONOSCOPY;  Surgeon: JANINA Dominguez MD;  Location: [address];  Service: Endoscopy;  Laterality: N/A;    LYMPH NODE BIOPSY N/A 8/29/2023    Procedure: BIOPSY, LYMPH NODE;  Surgeon: Berry Jackson MD;  Location: [facility];  Service: OB/GYN;  Laterality: N/A;    LYSIS OF ADHESIONS OF URETER Left 8/29/2023    Procedure: URETEROLYSIS, ROBOTIC;  Surgeon: Berry Jackson MD;  Location: [facility];  Service: OB/GYN;  Laterality: Left;    MAPPING, LYMPH NODE, SENTINEL N/A 8/29/2023    Procedure: MAPPING, LYMPH NODE, SENTINEL;  Surgeon: Berry Jackson MD;  Location: [facility];  Service: OB/GYN;  Laterality: N/A;    ROBOT-ASSISTED LAPAROSCOPIC ABDOMINAL HYSTERECTOMY USING DA JUAN XI N/A 8/29/2023    Procedure: XI ROBOTIC HYSTERECTOMY;  Surgeon: Berry Jackson MD;  Location: [facility];  Service: OB/GYN;  Laterality: N/A;  3 hr case / MEDICARE COVERAGE MUST BE SURGERY ADMIT    ROBOT-ASSISTED LAPAROSCOPIC SALPINGO-OOPHORECTOMY USING DA JUAN XI Bilateral 8/29/2023    Procedure: XI ROBOTIC SALPINGO-OOPHORECTOMY;  Surgeon: Berry Jackson MD;  Location: [facility];  Service: OB/GYN;  Laterality: Bilateral;    TONSILLECTOMY  1958    TUBAL LIGATION   1979    PP BTL       ALLERGIES:   Review of patient's allergies indicates:   Allergen Reactions    Chlorhexidine Rash    Poison ivy [vit e-nonoxynol 9-aloe vera] Rash       MEDICATIONS:  Current Outpatient Medications   Medication Sig    acetaminophen (TYLENOL) 650 MG TbSR Take 1 tablet (650 mg total) by mouth every 6 (six) hours as needed.    albuterol (PROVENTIL/VENTOLIN HFA) 90 mcg/actuation inhaler Inhale 2 puffs into the lungs every 6 (six) hours as needed for Wheezing. Rescue    aspirin (ECOTRIN) 81 MG EC tablet Take 81 mg by mouth once daily.    calcium carbonate/vitamin D3 (CALCIUM WITH VITAMIN D3 ORAL) Take by mouth.    cetirizine (ZYRTEC) 10 MG tablet TAKE 1 TABLET(10 MG) BY MOUTH EVERY DAY    EScitalopram oxalate (LEXAPRO) 20 MG tablet Take 1 tablet (20 mg total) by mouth once daily.    FLAXSEED OIL ORAL Take by mouth.    fluticasone propionate (FLONASE) 50 mcg/actuation nasal spray USE 1 TO 2 SPRAYS IN EACH NOSTRIL ONCE DAILY    glucosamine-chondroitin 500-400 mg tablet Take 1 tablet by mouth 3 (three) times daily.    omega-3 fatty acids/fish oil (FISH OIL-OMEGA-3 FATTY ACIDS) 300-1,000 mg capsule Take by mouth once daily.    vit A/C/E ac/ZnOx/cupric oxide (EYE VITAMIN AND MINERALS ORAL) Take by mouth.    estradiol (ESTRACE) 0.01 % (0.1 mg/gram) vaginal cream Place 0.5 g vaginally twice a week. Insert 0.5grams intravaginally twice weekly    valACYclovir (VALTREX) 1000 MG tablet Take 1 tablet (1,000 mg total) by mouth daily as needed. (Patient not taking: Reported on 10/16/2023)     No current facility-administered medications for this visit.       SOCIAL HISTORY:  Social History     Socioeconomic History    Marital status:    Tobacco Use    Smoking status: Never     Passive exposure: Never    Smokeless tobacco: Never   Substance and Sexual Activity    Alcohol use: Yes     Alcohol/week: 7.0 standard drinks of alcohol     Types: 7 Glasses of wine per week     Comment: occasionally    Drug use: No     Sexual activity: Yes     Partners: Male     Birth control/protection: Post-menopausal       FAMILY HISTORY:  Family History   Problem Relation Age of Onset    Hypertension Mother     Multiple myeloma Father     Colon cancer Sister 61    Hypertrophic cardiomyopathy Son     Breast cancer Maternal Aunt 72    Breast cancer Paternal Aunt 58    Diabetes Paternal Grandmother     Heart disease Neg Hx     Stroke Neg Hx     Ovarian cancer Neg Hx     Melanoma Neg Hx          PHYSICAL EXAMINATION:  ECO  Vitals:    10/16/23 0928   BP: (!) 112/59   Pulse: (!) 52   Temp: 98 °F (36.7 °C)     Body mass index is 31.31 kg/m².    Physical Exam  Exam conducted with a chaperone present.   Constitutional:       Appearance: Normal appearance.   HENT:      Head: Normocephalic and atraumatic.      Nose: Nose normal.      Mouth/Throat:      Mouth: Mucous membranes are moist.   Cardiovascular:      Rate and Rhythm: Normal rate.   Pulmonary:      Effort: Pulmonary effort is normal.   Abdominal:      General: Abdomen is flat.      Palpations: Abdomen is soft.   Genitourinary:     General: Normal vulva.      Uterus: Absent.    Musculoskeletal:         General: Normal range of motion.      Cervical back: Normal range of motion.   Skin:     General: Skin is warm and dry.   Neurological:      General: No focal deficit present.      Mental Status: She is alert. Mental status is at baseline.             ASSESSMENT/PLAN:  Lenore was seen today for endometrial cancer.    Diagnoses and all orders for this visit:    Endometrial cancer  -     Ambulatory referral/consult to Radiation Oncology        The patient has intermediate risk endometrial cancer.  She is recommended vaginal cuff brachytherapy to decrease her risk of local recurrence.  She will receive dose of 21 Gy delivered in 3 fractions over 3 weeks.    The risks and benefits of treatment have been discussed with the patient and she expressed full understanding. she understands the treatment  plan and willing to proceed accordingly.    I spent approximately 60 minutes reviewing the available records and evaluating the patient, out of which over 50% of the time was spent face to face with the patient in counseling and coordinating this patient's care.

## 2023-11-01 ENCOUNTER — HOSPITAL ENCOUNTER (OUTPATIENT)
Dept: RADIATION THERAPY | Facility: HOSPITAL | Age: 73
Discharge: HOME | End: 2023-11-01
Attending: RADIOLOGY
Payer: MEDICARE

## 2023-11-01 ENCOUNTER — PROCEDURE VISIT (OUTPATIENT)
Dept: RADIATION ONCOLOGY | Facility: CLINIC | Age: 73
End: 2023-11-01
Payer: MEDICARE

## 2023-11-01 DIAGNOSIS — C54.1 ENDOMETRIAL CANCER: Primary | ICD-10-CM

## 2023-11-01 PROCEDURE — 77014 HC CT GUIDANCE RADIATION THERAPY FLDS PLACEMENT: CPT | Mod: TC,HCNC | Performed by: RADIOLOGY

## 2023-11-01 PROCEDURE — 77334 RADIATION TREATMENT AID(S): CPT | Mod: TC,HCNC | Performed by: RADIOLOGY

## 2023-11-01 PROCEDURE — 77334 RADIATION TREATMENT AID(S): CPT | Mod: 26,HCNC,, | Performed by: RADIOLOGY

## 2023-11-01 PROCEDURE — 77290 THER RAD SIMULAJ FIELD CPLX: CPT | Mod: 26,HCNC,, | Performed by: RADIOLOGY

## 2023-11-01 PROCEDURE — 77263 THER RADIOLOGY TX PLNG CPLX: CPT | Mod: HCNC,,, | Performed by: RADIOLOGY

## 2023-11-01 PROCEDURE — 77263 PR  RADIATION THERAPY PLAN COMPLEX: ICD-10-PCS | Mod: HCNC,,, | Performed by: RADIOLOGY

## 2023-11-01 PROCEDURE — 77290 PR  SET RADN THERAPY FIELD COMPLEX: ICD-10-PCS | Mod: 26,HCNC,, | Performed by: RADIOLOGY

## 2023-11-01 PROCEDURE — 77334 PR  RADN TREATMENT AID(S) COMPLX: ICD-10-PCS | Mod: 26,HCNC,, | Performed by: RADIOLOGY

## 2023-11-01 PROCEDURE — 77290 THER RAD SIMULAJ FIELD CPLX: CPT | Mod: TC,HCNC | Performed by: RADIOLOGY

## 2023-11-01 NOTE — PROCEDURES
Procedures      PATIENT IDENTIFICATION:  Patient Name: Lenore Subramanian  MRN: [id number]  : 1950    DIAGNOSIS: Cancer Staging   Endometrial cancer  Staging form: Corpus Uteri - Carcinoma and Carcinosarcoma, AJCC 8th Edition  - Pathologic stage from 2023: FIGO Stage IB (pT1b, pN0(sn), cM0) - Signed by Vannessa Lucero MD on 10/16/2023    Endometrial cancer [C54.1]      REFERRING PHYSICIAN: No referring provider defined for this encounter.    Date of procedure: 2023    PATIENT INFORMATION: The patient is a 73-year-old woman with a FIGO stage IB endometrial cancer.  She is status post TAHBSO and is referred for consideration of vaginal cuff brachytherapy.       The patient presented postmenopausal bleeding.  She underwent endometrial biopsy on 2023 which revealed a FIGO grade 1 endometrial endometrioid adenocarcinoma.     The patient was referred to Dr. Jackson for surgical management.  She was taken to the operating room on 2023 for surgical resection.  Final pathology confirmed the presence of a FIGO grade 1 endometrial endometrioid adenocarcinoma with 73% myometrial invasion.  The tumor measured 3 cm in greatest dimension.  There was no lymphovascular invasion.  Seven pelvic lymph nodes were negative for evidence of metastatic carcinoma.  Oncology History   Endometrial cancer   8/15/2023 Initial Diagnosis    Endometrial cancer     2023 Cancer Staged    Staging form: Corpus Uteri - Carcinoma and Carcinosarcoma, AJCC 8th Edition  - Pathologic stage from 2023: FIGO Stage IB (pT1b, pN0(sn), cM0)     2023 Surgery    Surgeon: Dr. Berry Jackson  Robotic Hysterectomy         PROCEDURE: CT simulation for vaginal cylinder    DEVICE USED: 3 cm vaginal cylinder      The patient was brought to the CT simulator and placed on the couch in the supine position.  Exam was performed and the appropriate cylinder was placed.  Transaxial images were performed through the pelvis to  confirm adequate positioning and placement of the device.  Prior to the procedure, the patient was instructed to keep a full bladder to minimize dose to the small bowel.  After the procedure, the patient was given a treatment start date.    PLANNED DOSE: 21 Gy in 3 fractions

## 2023-11-07 PROCEDURE — 77470 SPECIAL RADIATION TREATMENT: CPT | Mod: 59,TC,HCNC | Performed by: RADIOLOGY

## 2023-11-07 PROCEDURE — 77295 PR 3D RADIOTHERAPY PLAN: ICD-10-PCS | Mod: 26,HCNC,, | Performed by: RADIOLOGY

## 2023-11-07 PROCEDURE — 77295 3-D RADIOTHERAPY PLAN: CPT | Mod: TC,HCNC | Performed by: RADIOLOGY

## 2023-11-07 PROCEDURE — 77470 SPECIAL RADIATION TREATMENT: CPT | Mod: 26,59,HCNC, | Performed by: RADIOLOGY

## 2023-11-07 PROCEDURE — 77470 PR  SPECIAL RADIATION TREATMENT: ICD-10-PCS | Mod: 26,59,HCNC, | Performed by: RADIOLOGY

## 2023-11-07 PROCEDURE — 77300 RADIATION THERAPY DOSE PLAN: CPT | Mod: 26,HCNC,, | Performed by: RADIOLOGY

## 2023-11-07 PROCEDURE — 77300 RADIATION THERAPY DOSE PLAN: CPT | Mod: TC,HCNC | Performed by: RADIOLOGY

## 2023-11-07 PROCEDURE — 77295 3-D RADIOTHERAPY PLAN: CPT | Mod: 26,HCNC,, | Performed by: RADIOLOGY

## 2023-11-07 PROCEDURE — 77370 RADIATION PHYSICS CONSULT: CPT | Mod: HCNC | Performed by: RADIOLOGY

## 2023-11-07 PROCEDURE — 77300 PR RADIATION THERAPY,DOSIMETRY PLAN: ICD-10-PCS | Mod: 26,HCNC,, | Performed by: RADIOLOGY

## 2023-11-08 ENCOUNTER — PROCEDURE VISIT (OUTPATIENT)
Dept: RADIATION ONCOLOGY | Facility: CLINIC | Age: 73
End: 2023-11-08
Payer: MEDICARE

## 2023-11-08 DIAGNOSIS — C54.1 ENDOMETRIAL CANCER: Primary | ICD-10-CM

## 2023-11-08 PROCEDURE — 77770 HDR RDNCL NTRSTL/ICAV BRCHTX: CPT | Mod: 26,HCNC,, | Performed by: RADIOLOGY

## 2023-11-08 PROCEDURE — 57156 INS VAG BRACHYTX DEVICE: CPT | Mod: TC,HCNC | Performed by: RADIOLOGY

## 2023-11-08 PROCEDURE — C1717 BRACHYTX, NON-STR,HDR IR-192: HCPCS | Mod: HCNC | Performed by: RADIOLOGY

## 2023-11-08 PROCEDURE — 57156 PR INSERT VAGINAL RADIATION DEVICE: ICD-10-PCS | Mod: HCNC,,, | Performed by: RADIOLOGY

## 2023-11-08 PROCEDURE — 57156 INS VAG BRACHYTX DEVICE: CPT | Mod: HCNC,,, | Performed by: RADIOLOGY

## 2023-11-08 PROCEDURE — 77770 HDR RDNCL NTRSTL/ICAV BRCHTX: CPT | Mod: TC,HCNC | Performed by: RADIOLOGY

## 2023-11-08 PROCEDURE — 77770 PR HDR RDNCL NTRSTL/ICAV BRCHTX 1 CH: ICD-10-PCS | Mod: 26,HCNC,, | Performed by: RADIOLOGY

## 2023-11-08 NOTE — NURSING
11/08/2023  Nurse: Jatinder Jauregui    Procedure: Vaginal Cylinder    1:36 PM Patient arrived from Home.  Time out performed.      2:22 PM Positioned on Stretcher in the Frog Leg position. Positioned with Knee Immobilizer by myself and 3.0 vaginal applicator inserted.HDR treatment given with a full bladder.    2:35 PM  Discharge instructions reviewed with patient.  Patient verbalized understanding.  Patient discharged to home.

## 2023-11-08 NOTE — PROCEDURES
Procedures    PATIENT IDENTIFICATION:  Patient Name: Lenore Subramanian  MRN: [id number]  : 1950    DIAGNOSIS: Cancer Staging   Endometrial cancer  Staging form: Corpus Uteri - Carcinoma and Carcinosarcoma, AJCC 8th Edition  - Pathologic stage from 2023: FIGO Stage IB (pT1b, pN0(sn), cM0) - Signed by Vannessa Lucero MD on 10/16/2023    Endometrial cancer [C54.1]      REFERRING PHYSICIAN: No referring provider defined for this encounter.    Date of procedure: 2023    PATIENT INFORMATION: The patient is a 73-year-old woman with a FIGO stage IB endometrial cancer. She is status post TAHBSO and is referred for consideration of vaginal cuff brachytherapy.   The patient presented postmenopausal bleeding. She underwent endometrial biopsy on 2023 which revealed a FIGO grade 1 endometrial endometrioid adenocarcinoma.  The patient was referred to Dr. Jackson for surgical management. She was taken to the operating room on 2023 for surgical resection. Final pathology confirmed the presence of a FIGO grade 1 endometrial endometrioid adenocarcinoma with 73% myometrial invasion. The tumor measured 3 cm in greatest dimension. There was no lymphovascular invasion. Seven pelvic lymph nodes were negative for evidence of metastatic carcinoma.      PROCEDURE: Intracavitary vaginal cylinder #1     AREA TREATED: Upper 5 cm of vagina     TREATMENT METHOD: Insertion of 3 cm vaginal cylinder     RADIATION: Iridium 192, high dose rate     DEPTH OF CALCULATION: vaginal surface     DOSE: 7 Gy      Time out:   Performed by Jatinder Jauregui RN     Identifiers: Name and date of birth     The patient was brought to the HDR delivery room and  the vaginal cylinder was placed into the vagina.  The brachytherapy catheter was connected to the cylinder and the treatment was delivered.  She received the 1 out of 3 planned fractions of HDR vaginal brachytherapy as noted above.  She tolerated the treatment well without any  unexpected events.

## 2023-11-15 ENCOUNTER — PROCEDURE VISIT (OUTPATIENT)
Dept: RADIATION ONCOLOGY | Facility: CLINIC | Age: 73
End: 2023-11-15
Payer: MEDICARE

## 2023-11-15 DIAGNOSIS — C54.1 ENDOMETRIAL CANCER: Primary | ICD-10-CM

## 2023-11-15 PROCEDURE — 77770 HDR RDNCL NTRSTL/ICAV BRCHTX: CPT | Mod: 26,HCNC,, | Performed by: RADIOLOGY

## 2023-11-15 PROCEDURE — 57156 INS VAG BRACHYTX DEVICE: CPT | Mod: TC,HCNC | Performed by: RADIOLOGY

## 2023-11-15 PROCEDURE — 77770 HDR RDNCL NTRSTL/ICAV BRCHTX: CPT | Mod: TC,HCNC | Performed by: RADIOLOGY

## 2023-11-15 PROCEDURE — 77770 PR HDR RDNCL NTRSTL/ICAV BRCHTX 1 CH: ICD-10-PCS | Mod: 26,HCNC,, | Performed by: RADIOLOGY

## 2023-11-15 PROCEDURE — C1717 BRACHYTX, NON-STR,HDR IR-192: HCPCS | Mod: HCNC | Performed by: RADIOLOGY

## 2023-11-15 PROCEDURE — 57156 INS VAG BRACHYTX DEVICE: CPT | Mod: HCNC,,, | Performed by: RADIOLOGY

## 2023-11-15 PROCEDURE — 57156 PR INSERT VAGINAL RADIATION DEVICE: ICD-10-PCS | Mod: HCNC,,, | Performed by: RADIOLOGY

## 2023-11-15 NOTE — NURSING
11/15/2023  Nurse: Jatinder Jauregui    Procedure: Vaginal Cylinder    2:42 PM Patient arrived from Home.  Time out performed.      3:11 PM Positioned on Stretcher in the Frog Leg position. Positioned with Knee Immobilizer by myself and 3.0 CM vaginal applicator inserted.HDR treatment given with a full bladder.         3:35 PM Discharge instructions reviewed with patient.  Patient verbalized understanding.  Patient discharged to home.

## 2023-11-15 NOTE — PROCEDURES
Procedures    PATIENT IDENTIFICATION:  Patient Name: Lenore Subramanian  MRN: [id number]  : 1950    DIAGNOSIS:  Cancer Staging   Endometrial cancer  Staging form: Corpus Uteri - Carcinoma and Carcinosarcoma, AJCC 8th Edition  - Pathologic stage from 2023: FIGO Stage IB (pT1b, pN0(sn), cM0) - Signed by Vannessa Lucero MD on 10/16/2023    Endometrial cancer [C54.1]      REFERRING PHYSICIAN: No referring provider defined for this encounter.    Date of procedure: 11/15/2023    PATIENT INFORMATION: The patient is a 73-year-old woman with a FIGO stage IB endometrial cancer. She is status post TAHBSO and is referred for consideration of vaginal cuff brachytherapy.   The patient presented postmenopausal bleeding. She underwent endometrial biopsy on 2023 which revealed a FIGO grade 1 endometrial endometrioid adenocarcinoma.  The patient was referred to Dr. Jackson for surgical management. She was taken to the operating room on 2023 for surgical resection. Final pathology confirmed the presence of a FIGO grade 1 endometrial endometrioid adenocarcinoma with 73% myometrial invasion. The tumor measured 3 cm in greatest dimension. There was no lymphovascular invasion. Seven pelvic lymph nodes were negative for evidence of metastatic carcinoma.      PROCEDURE: Intracavitary vaginal cylinder #2     AREA TREATED: Upper 5 cm of vagina     TREATMENT METHOD: Insertion of 3 cm vaginal cylinder     RADIATION: Iridium 192, high dose rate     DEPTH OF CALCULATION: vaginal surface     DOSE: 7 Gy      Time out:   Performed by Jatinder Jauregui RN     Identifiers: Name and date of birth     The patient was brought to the HDR delivery room and  the vaginal cylinder was placed into the vagina.  The brachytherapy catheter was connected to the cylinder and the treatment was delivered.  She received the 2 out of 3 planned fractions of HDR vaginal brachytherapy as noted above.  She tolerated the treatment well without any  unexpected events.

## 2023-11-16 ENCOUNTER — TELEPHONE (OUTPATIENT)
Dept: FAMILY MEDICINE | Facility: CLINIC | Age: 73
End: 2023-11-16
Payer: MEDICARE

## 2023-11-16 DIAGNOSIS — R93.89 ABNORMAL FINDINGS ON DIAGNOSTIC IMAGING OF OTHER SPECIFIED BODY STRUCTURES: ICD-10-CM

## 2023-11-16 DIAGNOSIS — R79.9 ABNORMAL FINDING OF BLOOD CHEMISTRY, UNSPECIFIED: ICD-10-CM

## 2023-11-16 DIAGNOSIS — Z00.00 ROUTINE PHYSICAL EXAMINATION: Primary | ICD-10-CM

## 2023-11-16 NOTE — TELEPHONE ENCOUNTER
----- Message from Tanya Martinez sent at 11/16/2023  4:48 PM CST -----  Regarding: Request for Lab Appointment  Type: Lab    Caller is requesting to schedule their Lab appointment prior to annual appointment.    Order is not listed in EPIC.  Please enter order and contact patient to schedule.    Name of Caller: Self     Preferred Date and Time of Labs- 12/11    Date of EPP Appointment: 12/13    Where would they like the lab performed? [facility]     Would the patient rather a call back or a response via [facility]? Call     Best Call Back Number: [phone]

## 2023-11-22 ENCOUNTER — PROCEDURE VISIT (OUTPATIENT)
Dept: RADIATION ONCOLOGY | Facility: CLINIC | Age: 73
End: 2023-11-22
Payer: MEDICARE

## 2023-11-22 DIAGNOSIS — C54.1 ENDOMETRIAL CANCER: Primary | ICD-10-CM

## 2023-11-22 PROCEDURE — C1717 BRACHYTX, NON-STR,HDR IR-192: HCPCS | Mod: HCNC | Performed by: RADIOLOGY

## 2023-11-22 PROCEDURE — 77770 HDR RDNCL NTRSTL/ICAV BRCHTX: CPT | Mod: 26,HCNC,, | Performed by: RADIOLOGY

## 2023-11-22 PROCEDURE — 77770 HDR RDNCL NTRSTL/ICAV BRCHTX: CPT | Mod: TC,HCNC | Performed by: RADIOLOGY

## 2023-11-22 PROCEDURE — 57156 PR INSERT VAGINAL RADIATION DEVICE: ICD-10-PCS | Mod: HCNC,,, | Performed by: RADIOLOGY

## 2023-11-22 PROCEDURE — 57156 INS VAG BRACHYTX DEVICE: CPT | Mod: HCNC,,, | Performed by: RADIOLOGY

## 2023-11-22 PROCEDURE — 57156 INS VAG BRACHYTX DEVICE: CPT | Mod: TC,HCNC | Performed by: RADIOLOGY

## 2023-11-22 PROCEDURE — 77770 PR HDR RDNCL NTRSTL/ICAV BRCHTX 1 CH: ICD-10-PCS | Mod: 26,HCNC,, | Performed by: RADIOLOGY

## 2023-11-22 NOTE — NURSING
11/22/2023  Nurse: Jatinder Jauregui    Procedure: Vaginal Cylinder    2:36 PM Patient arrived from Home.  Time out performed.      3:05 PM Repositioned in the supine position with frog leg. Positioned with Knee Immobilizer.      3:25 PM Discharge instructions reviewed with patient.  Patient verbalized understanding.  Patient discharged to home. Pt to f/u with Dr Lucero in 3 months. Appt given to pt.

## 2023-11-22 NOTE — PROCEDURES
Procedures    PATIENT IDENTIFICATION:  Patient Name: Lenore Subramanian  MRN: [id number]  : 1950    DIAGNOSIS:  Cancer Staging   Endometrial cancer  Staging form: Corpus Uteri - Carcinoma and Carcinosarcoma, AJCC 8th Edition  - Pathologic stage from 2023: FIGO Stage IB (pT1b, pN0(sn), cM0) - Signed by Vannessa Lucero MD on 10/16/2023    No primary diagnosis found.      REFERRING PHYSICIAN: No referring provider defined for this encounter.    Date of procedure: 2023    PATIENT INFORMATION: The patient is a 73-year-old woman with a FIGO stage IB endometrial cancer. She is status post TAHBSO and is referred for consideration of vaginal cuff brachytherapy.   The patient presented postmenopausal bleeding. She underwent endometrial biopsy on 2023 which revealed a FIGO grade 1 endometrial endometrioid adenocarcinoma.  The patient was referred to Dr. Jackson for surgical management. She was taken to the operating room on 2023 for surgical resection. Final pathology confirmed the presence of a FIGO grade 1 endometrial endometrioid adenocarcinoma with 73% myometrial invasion. The tumor measured 3 cm in greatest dimension. There was no lymphovascular invasion. Seven pelvic lymph nodes were negative for evidence of metastatic carcinoma.      PROCEDURE: Intracavitary vaginal cylinder #3     AREA TREATED: Upper 5 cm of vagina     TREATMENT METHOD: Insertion of 3 cm vaginal cylinder     RADIATION: Iridium 192, high dose rate     DEPTH OF CALCULATION: vaginal surface     DOSE: 7 Gy      Time out:   Performed by Jatinder Jauregui RN     Identifiers: Name and date of birth     The patient was brought to the HDR delivery room and  the vaginal cylinder was placed into the vagina.  The brachytherapy catheter was connected to the cylinder and the treatment was delivered.  She received the 3 out of 3 planned fractions of HDR vaginal brachytherapy as noted above.  She tolerated the treatment well without any  unexpected events.

## 2023-12-01 ENCOUNTER — HOSPITAL ENCOUNTER (OUTPATIENT)
Dept: RADIATION THERAPY | Facility: HOSPITAL | Age: 73
Discharge: HOME | End: 2023-12-01
Attending: RADIOLOGY
Payer: MEDICARE

## 2023-12-01 PROCEDURE — 77336 RADIATION PHYSICS CONSULT: CPT | Mod: HCNC | Performed by: RADIOLOGY

## 2023-12-08 ENCOUNTER — TELEPHONE (OUTPATIENT)
Dept: FAMILY MEDICINE | Facility: CLINIC | Age: 73
End: 2023-12-08
Payer: MEDICARE

## 2023-12-08 NOTE — TELEPHONE ENCOUNTER
----- Message from Jia Mojica MA sent at 12/8/2023  7:11 AM CST -----  Name of Who is Calling:ROWENA MADRID [[id number]]                What is the request in detail: Pt is complaining of right knee pain and would like to be seen today if possible. Please assist.                Can the clinic reply by [facility]: No                What [facility] to [facility] Back [facility]: [phone]

## 2023-12-11 ENCOUNTER — LAB VISIT (OUTPATIENT)
Dept: LAB | Facility: HOSPITAL | Age: 73
End: 2023-12-11
Attending: FAMILY MEDICINE
Payer: MEDICARE

## 2023-12-11 DIAGNOSIS — Z00.00 ROUTINE PHYSICAL EXAMINATION: ICD-10-CM

## 2023-12-11 DIAGNOSIS — R79.9 ABNORMAL FINDING OF BLOOD CHEMISTRY, UNSPECIFIED: ICD-10-CM

## 2023-12-11 LAB
CHOLEST SERPL-MCNC: 201 MG/DL (ref 120–199)
CHOLEST/HDLC SERPL: 3.5 {RATIO} (ref 2–5)
HDLC SERPL-MCNC: 57 MG/DL (ref 40–75)
HDLC SERPL: 28.4 % (ref 20–50)
LDLC SERPL CALC-MCNC: 127.8 MG/DL (ref 63–159)
NONHDLC SERPL-MCNC: 144 MG/DL
TRIGL SERPL-MCNC: 81 MG/DL (ref 30–150)

## 2023-12-11 PROCEDURE — 36415 COLL VENOUS BLD VENIPUNCTURE: CPT | Mod: HCNC,PO | Performed by: FAMILY MEDICINE

## 2023-12-11 PROCEDURE — 80061 LIPID PANEL: CPT | Mod: HCNC | Performed by: FAMILY MEDICINE

## 2023-12-12 ENCOUNTER — HOSPITAL ENCOUNTER (OUTPATIENT)
Dept: RADIOLOGY | Facility: HOSPITAL | Age: 73
Discharge: HOME | End: 2023-12-12
Attending: FAMILY MEDICINE
Payer: MEDICARE

## 2023-12-12 ENCOUNTER — OFFICE VISIT (OUTPATIENT)
Dept: FAMILY MEDICINE | Facility: CLINIC | Age: 73
End: 2023-12-12
Payer: MEDICARE

## 2023-12-12 VITALS
WEIGHT: 189.06 LBS | TEMPERATURE: 98 F | HEART RATE: 57 BPM | DIASTOLIC BLOOD PRESSURE: 58 MMHG | OXYGEN SATURATION: 97 % | HEIGHT: 66 IN | SYSTOLIC BLOOD PRESSURE: 118 MMHG | BODY MASS INDEX: 30.39 KG/M2

## 2023-12-12 DIAGNOSIS — M65.331 TRIGGER FINGER, RIGHT MIDDLE FINGER: ICD-10-CM

## 2023-12-12 DIAGNOSIS — Z78.0 POST-MENOPAUSE: ICD-10-CM

## 2023-12-12 DIAGNOSIS — Z12.31 ENCOUNTER FOR SCREENING MAMMOGRAM FOR BREAST CANCER: ICD-10-CM

## 2023-12-12 DIAGNOSIS — Z00.00 ROUTINE PHYSICAL EXAMINATION: Primary | ICD-10-CM

## 2023-12-12 DIAGNOSIS — M25.569 KNEE PAIN, UNSPECIFIED CHRONICITY, UNSPECIFIED LATERALITY: ICD-10-CM

## 2023-12-12 DIAGNOSIS — F41.8 DEPRESSION WITH ANXIETY: ICD-10-CM

## 2023-12-12 PROCEDURE — 99397 PER PM REEVAL EST PAT 65+ YR: CPT | Mod: HCNC,S$GLB,, | Performed by: FAMILY MEDICINE

## 2023-12-12 PROCEDURE — 3078F DIAST BP <80 MM HG: CPT | Mod: HCNC,CPTII,S$GLB, | Performed by: FAMILY MEDICINE

## 2023-12-12 PROCEDURE — 73565 X-RAY EXAM OF KNEES: CPT | Mod: 26,HCNC,, | Performed by: RADIOLOGY

## 2023-12-12 PROCEDURE — 73565 XR KNEE AP STANDING BILATERAL: ICD-10-PCS | Mod: 26,HCNC,, | Performed by: RADIOLOGY

## 2023-12-12 PROCEDURE — 3044F HG A1C LEVEL LT 7.0%: CPT | Mod: HCNC,CPTII,S$GLB, | Performed by: FAMILY MEDICINE

## 2023-12-12 PROCEDURE — 3074F SYST BP LT 130 MM HG: CPT | Mod: HCNC,CPTII,S$GLB, | Performed by: FAMILY MEDICINE

## 2023-12-12 PROCEDURE — 99397 PR PREVENTIVE VISIT,EST,65 & OVER: ICD-10-PCS | Mod: HCNC,S$GLB,, | Performed by: FAMILY MEDICINE

## 2023-12-12 PROCEDURE — 3044F PR MOST RECENT HEMOGLOBIN A1C LEVEL <7.0%: ICD-10-PCS | Mod: HCNC,CPTII,S$GLB, | Performed by: FAMILY MEDICINE

## 2023-12-12 PROCEDURE — 1101F PT FALLS ASSESS-DOCD LE1/YR: CPT | Mod: HCNC,CPTII,S$GLB, | Performed by: FAMILY MEDICINE

## 2023-12-12 PROCEDURE — 99999 PR PBB SHADOW E&M-EST. PATIENT-LVL V: ICD-10-PCS | Mod: PBBFAC,HCNC,, | Performed by: FAMILY MEDICINE

## 2023-12-12 PROCEDURE — 3008F BODY MASS INDEX DOCD: CPT | Mod: HCNC,CPTII,S$GLB, | Performed by: FAMILY MEDICINE

## 2023-12-12 PROCEDURE — 1125F PR PAIN SEVERITY QUANTIFIED, PAIN PRESENT: ICD-10-PCS | Mod: HCNC,CPTII,S$GLB, | Performed by: FAMILY MEDICINE

## 2023-12-12 PROCEDURE — 3008F PR BODY MASS INDEX (BMI) DOCUMENTED: ICD-10-PCS | Mod: HCNC,CPTII,S$GLB, | Performed by: FAMILY MEDICINE

## 2023-12-12 PROCEDURE — 3078F PR MOST RECENT DIASTOLIC BLOOD PRESSURE < 80 MM HG: ICD-10-PCS | Mod: HCNC,CPTII,S$GLB, | Performed by: FAMILY MEDICINE

## 2023-12-12 PROCEDURE — 1159F MED LIST DOCD IN RCRD: CPT | Mod: HCNC,CPTII,S$GLB, | Performed by: FAMILY MEDICINE

## 2023-12-12 PROCEDURE — 3074F PR MOST RECENT SYSTOLIC BLOOD PRESSURE < 130 MM HG: ICD-10-PCS | Mod: HCNC,CPTII,S$GLB, | Performed by: FAMILY MEDICINE

## 2023-12-12 PROCEDURE — 99999 PR PBB SHADOW E&M-EST. PATIENT-LVL V: CPT | Mod: PBBFAC,HCNC,, | Performed by: FAMILY MEDICINE

## 2023-12-12 PROCEDURE — 1125F AMNT PAIN NOTED PAIN PRSNT: CPT | Mod: HCNC,CPTII,S$GLB, | Performed by: FAMILY MEDICINE

## 2023-12-12 PROCEDURE — 3288F PR FALLS RISK ASSESSMENT DOCUMENTED: ICD-10-PCS | Mod: HCNC,CPTII,S$GLB, | Performed by: FAMILY MEDICINE

## 2023-12-12 PROCEDURE — 73565 X-RAY EXAM OF KNEES: CPT | Mod: TC,HCNC,FY,PO

## 2023-12-12 PROCEDURE — 1101F PR PT FALLS ASSESS DOC 0-1 FALLS W/OUT INJ PAST YR: ICD-10-PCS | Mod: HCNC,CPTII,S$GLB, | Performed by: FAMILY MEDICINE

## 2023-12-12 PROCEDURE — 3288F FALL RISK ASSESSMENT DOCD: CPT | Mod: HCNC,CPTII,S$GLB, | Performed by: FAMILY MEDICINE

## 2023-12-12 PROCEDURE — 1159F PR MEDICATION LIST DOCUMENTED IN MEDICAL RECORD: ICD-10-PCS | Mod: HCNC,CPTII,S$GLB, | Performed by: FAMILY MEDICINE

## 2023-12-12 RX ORDER — ESCITALOPRAM OXALATE 20 MG/1
20 TABLET ORAL DAILY
Qty: 90 TABLET | Refills: 3 | Status: SHIPPED | OUTPATIENT
Start: 2023-12-12

## 2023-12-12 NOTE — PROGRESS NOTES
[facility]  Progress Note    SUBJECTIVE:     Chief Complaint   Patient presents with    Annual Exam    Knee Pain     Both     Hand Pain     Middle and ring finger (right hand)       HPI   Lenore Subramanian  is a 73 y.o. female here for physical exam. Patient has no other new complaints/problems at this time.      Review of patient's allergies indicates:   Allergen Reactions    Chlorhexidine Rash    Poison ivy [vit e-nonoxynol 9-aloe vera] Rash       Past Medical History:   Diagnosis Date    Anxiety     Asthma     as a child    Endometrial cancer 8/15/2023    Mental disorder     Depression    HERVE (obstructive sleep apnea)      Past Surgical History:   Procedure Laterality Date    COLONOSCOPY N/A 10/25/2016    Procedure: COLONOSCOPY;  Surgeon: JANINA Dominguez MD;  Location: [address]);  Service: Endoscopy;  Laterality: N/A;    LYMPH NODE BIOPSY N/A 8/29/2023    Procedure: BIOPSY, LYMPH NODE;  Surgeon: Berry Jackson MD;  Location: [facility];  Service: OB/GYN;  Laterality: N/A;    LYSIS OF ADHESIONS OF URETER Left 8/29/2023    Procedure: URETEROLYSIS, ROBOTIC;  Surgeon: Berry Jackson MD;  Location: [facility];  Service: OB/GYN;  Laterality: Left;    MAPPING, LYMPH NODE, SENTINEL N/A 8/29/2023    Procedure: MAPPING, LYMPH NODE, SENTINEL;  Surgeon: Berry Jackson MD;  Location: [facility];  Service: OB/GYN;  Laterality: N/A;    ROBOT-ASSISTED LAPAROSCOPIC ABDOMINAL HYSTERECTOMY USING DA JUAN XI N/A 8/29/2023    Procedure: XI ROBOTIC HYSTERECTOMY;  Surgeon: Berry Jackson MD;  Location: [facility];  Service: OB/GYN;  Laterality: N/A;  3 hr case / MEDICARE COVERAGE MUST BE SURGERY ADMIT    ROBOT-ASSISTED LAPAROSCOPIC SALPINGO-OOPHORECTOMY USING DA JUAN XI Bilateral 8/29/2023    Procedure: XI ROBOTIC SALPINGO-OOPHORECTOMY;  Surgeon: Berry Jackson MD;  Location: [facility];  Service: OB/GYN;  Laterality: Bilateral;    TONSILLECTOMY  1958    TUBAL LIGATION  1979    PP BTL      Family History   Problem Relation Age of Onset    Hypertension Mother     Multiple myeloma Father     Colon cancer Sister 61    Hypertrophic cardiomyopathy Son     Breast cancer Maternal Aunt 72    Breast cancer Paternal Aunt 58    Diabetes Paternal Grandmother     Heart disease Neg Hx     Stroke Neg Hx     Ovarian cancer Neg Hx     Melanoma Neg Hx      Social History     Tobacco Use    Smoking status: Never     Passive exposure: Never    Smokeless tobacco: Never   Substance Use Topics    Alcohol use: Yes     Alcohol/week: 7.0 standard drinks of alcohol     Types: 7 Glasses of wine per week     Comment: occasionally    Drug use: No        Review of Systems   Constitutional:  Negative for chills, diaphoresis and fever.   HENT:  Negative for congestion, ear pain and sore throat.    Eyes:  Negative for photophobia and discharge.   Respiratory:  Negative for cough, shortness of breath and wheezing.    Cardiovascular:  Negative for chest pain and palpitations.   Gastrointestinal:  Negative for abdominal pain, constipation, diarrhea, nausea and vomiting.   Genitourinary:  Negative for dysuria and hematuria.   Musculoskeletal:  Positive for joint pain (R knee pain). Negative for back pain and myalgias.   Skin:  Negative for itching and rash.   Neurological:  Negative for dizziness, sensory change, focal weakness, weakness and headaches.   All other systems reviewed and are negative.    OBJECTIVE:     Vitals:    12/12/23 1353   BP: (!) 118/58   Pulse:    Temp:      Body mass index is 30.51 kg/m².    Physical Exam  Constitutional:       General: She is not in acute distress.     Appearance: She is not diaphoretic.   HENT:      Head: Normocephalic and atraumatic.      Right Ear: Tympanic membrane and ear canal normal. No hemotympanum. Tympanic membrane is not perforated, erythematous or bulging.      Left Ear: Tympanic membrane and ear canal normal. No hemotympanum. Tympanic membrane is not perforated, erythematous or  bulging.   Eyes:      Conjunctiva/sclera: Conjunctivae normal.      Pupils: Pupils are equal, round, and reactive to light.   Neck:      Thyroid: No thyromegaly.   Cardiovascular:      Rate and Rhythm: Normal rate and regular rhythm.      Heart sounds: Normal heart sounds. No murmur heard.     No friction rub. No gallop.   Pulmonary:      Effort: Pulmonary effort is normal. No respiratory distress.      Breath sounds: Normal breath sounds. No wheezing or rales.   Abdominal:      General: Bowel sounds are normal. There is no distension.      Palpations: Abdomen is soft.      Tenderness: There is no abdominal tenderness. There is no guarding or rebound.   Musculoskeletal:         General: No tenderness. Normal range of motion.   Skin:     General: Skin is warm.      Findings: No erythema or rash.   Neurological:      Mental Status: She is alert and oriented to person, place, and time.         Old records were reviewed. Labs and/or images were independently reviewed.    ASSESSMENT     1. Routine physical examination    2. Depression with anxiety    3. Knee pain, unspecified chronicity, unspecified laterality    4. Encounter for screening mammogram for breast cancer    5. Post-menopause        PLAN:     Routine physical examination   -     We briefly discussed diet, exercise, and routine preventive exams. All questions and comments addressed.    Depression with anxiety  -     EScitalopram oxalate (LEXAPRO) 20 MG tablet; Take 1 tablet (20 mg total) by mouth once daily.  Dispense: 90 tablet; Refill: 3  -     Stable. Continue current regimen.    Knee pain, unspecified chronicity, unspecified laterality  -     X-Ray Knee AP Standing Bilateral; Future; Expected date: 12/12/2023  -     consider knee injection on next time.     Encounter for screening mammogram for breast cancer  -     Mammo Digital Screening Bilat; Future; Expected date: 12/12/2023    Post-menopause  -     DXA Bone Density Axial Skeleton 1 or more sites;  Future; Expected date: 12/12/2023      RTC RADHA Allen MD  12/12/2023 2:01 PM

## 2023-12-14 ENCOUNTER — OFFICE VISIT (OUTPATIENT)
Dept: GYNECOLOGIC ONCOLOGY | Facility: CLINIC | Age: 73
End: 2023-12-14
Payer: MEDICARE

## 2023-12-14 VITALS
HEART RATE: 51 BPM | HEIGHT: 66 IN | DIASTOLIC BLOOD PRESSURE: 57 MMHG | SYSTOLIC BLOOD PRESSURE: 113 MMHG | BODY MASS INDEX: 30.47 KG/M2 | WEIGHT: 189.63 LBS

## 2023-12-14 DIAGNOSIS — E66.09 CLASS 1 OBESITY DUE TO EXCESS CALORIES WITH SERIOUS COMORBIDITY AND BODY MASS INDEX (BMI) OF 30.0 TO 30.9 IN ADULT: ICD-10-CM

## 2023-12-14 DIAGNOSIS — N89.8 VAGINAL LESION: ICD-10-CM

## 2023-12-14 DIAGNOSIS — C54.1 ENDOMETRIAL CANCER: Primary | ICD-10-CM

## 2023-12-14 PROCEDURE — 3044F HG A1C LEVEL LT 7.0%: CPT | Mod: HCNC,CPTII,S$GLB, | Performed by: STUDENT IN AN ORGANIZED HEALTH CARE EDUCATION/TRAINING PROGRAM

## 2023-12-14 PROCEDURE — 1101F PT FALLS ASSESS-DOCD LE1/YR: CPT | Mod: HCNC,CPTII,S$GLB, | Performed by: STUDENT IN AN ORGANIZED HEALTH CARE EDUCATION/TRAINING PROGRAM

## 2023-12-14 PROCEDURE — 99214 OFFICE O/P EST MOD 30 MIN: CPT | Mod: 25,HCNC,S$GLB, | Performed by: STUDENT IN AN ORGANIZED HEALTH CARE EDUCATION/TRAINING PROGRAM

## 2023-12-14 PROCEDURE — 1125F AMNT PAIN NOTED PAIN PRSNT: CPT | Mod: HCNC,CPTII,S$GLB, | Performed by: STUDENT IN AN ORGANIZED HEALTH CARE EDUCATION/TRAINING PROGRAM

## 2023-12-14 PROCEDURE — 3074F SYST BP LT 130 MM HG: CPT | Mod: HCNC,CPTII,S$GLB, | Performed by: STUDENT IN AN ORGANIZED HEALTH CARE EDUCATION/TRAINING PROGRAM

## 2023-12-14 PROCEDURE — 3008F PR BODY MASS INDEX (BMI) DOCUMENTED: ICD-10-PCS | Mod: HCNC,CPTII,S$GLB, | Performed by: STUDENT IN AN ORGANIZED HEALTH CARE EDUCATION/TRAINING PROGRAM

## 2023-12-14 PROCEDURE — 99999 PR PBB SHADOW E&M-EST. PATIENT-LVL III: ICD-10-PCS | Mod: PBBFAC,HCNC,, | Performed by: STUDENT IN AN ORGANIZED HEALTH CARE EDUCATION/TRAINING PROGRAM

## 2023-12-14 PROCEDURE — 1159F PR MEDICATION LIST DOCUMENTED IN MEDICAL RECORD: ICD-10-PCS | Mod: HCNC,CPTII,S$GLB, | Performed by: STUDENT IN AN ORGANIZED HEALTH CARE EDUCATION/TRAINING PROGRAM

## 2023-12-14 PROCEDURE — 1159F MED LIST DOCD IN RCRD: CPT | Mod: HCNC,CPTII,S$GLB, | Performed by: STUDENT IN AN ORGANIZED HEALTH CARE EDUCATION/TRAINING PROGRAM

## 2023-12-14 PROCEDURE — 3288F PR FALLS RISK ASSESSMENT DOCUMENTED: ICD-10-PCS | Mod: HCNC,CPTII,S$GLB, | Performed by: STUDENT IN AN ORGANIZED HEALTH CARE EDUCATION/TRAINING PROGRAM

## 2023-12-14 PROCEDURE — 88305 TISSUE EXAM BY PATHOLOGIST: CPT | Mod: HCNC | Performed by: PATHOLOGY

## 2023-12-14 PROCEDURE — 99214 PR OFFICE/OUTPT VISIT, EST, LEVL IV, 30-39 MIN: ICD-10-PCS | Mod: 25,HCNC,S$GLB, | Performed by: STUDENT IN AN ORGANIZED HEALTH CARE EDUCATION/TRAINING PROGRAM

## 2023-12-14 PROCEDURE — 3078F PR MOST RECENT DIASTOLIC BLOOD PRESSURE < 80 MM HG: ICD-10-PCS | Mod: HCNC,CPTII,S$GLB, | Performed by: STUDENT IN AN ORGANIZED HEALTH CARE EDUCATION/TRAINING PROGRAM

## 2023-12-14 PROCEDURE — 3044F PR MOST RECENT HEMOGLOBIN A1C LEVEL <7.0%: ICD-10-PCS | Mod: HCNC,CPTII,S$GLB, | Performed by: STUDENT IN AN ORGANIZED HEALTH CARE EDUCATION/TRAINING PROGRAM

## 2023-12-14 PROCEDURE — 88305 TISSUE EXAM BY PATHOLOGIST: CPT | Mod: 26,HCNC,, | Performed by: PATHOLOGY

## 2023-12-14 PROCEDURE — 3078F DIAST BP <80 MM HG: CPT | Mod: HCNC,CPTII,S$GLB, | Performed by: STUDENT IN AN ORGANIZED HEALTH CARE EDUCATION/TRAINING PROGRAM

## 2023-12-14 PROCEDURE — 88305 TISSUE EXAM BY PATHOLOGIST: ICD-10-PCS | Mod: 26,HCNC,, | Performed by: PATHOLOGY

## 2023-12-14 PROCEDURE — 57100 BIOPSY VAGINAL MUCOSA SIMPLE: CPT | Mod: HCNC,S$GLB,, | Performed by: STUDENT IN AN ORGANIZED HEALTH CARE EDUCATION/TRAINING PROGRAM

## 2023-12-14 PROCEDURE — 3008F BODY MASS INDEX DOCD: CPT | Mod: HCNC,CPTII,S$GLB, | Performed by: STUDENT IN AN ORGANIZED HEALTH CARE EDUCATION/TRAINING PROGRAM

## 2023-12-14 PROCEDURE — 3074F PR MOST RECENT SYSTOLIC BLOOD PRESSURE < 130 MM HG: ICD-10-PCS | Mod: HCNC,CPTII,S$GLB, | Performed by: STUDENT IN AN ORGANIZED HEALTH CARE EDUCATION/TRAINING PROGRAM

## 2023-12-14 PROCEDURE — 57100 PR BIOPSY OF VAGINA,SIMPLE: ICD-10-PCS | Mod: HCNC,S$GLB,, | Performed by: STUDENT IN AN ORGANIZED HEALTH CARE EDUCATION/TRAINING PROGRAM

## 2023-12-14 PROCEDURE — 3288F FALL RISK ASSESSMENT DOCD: CPT | Mod: HCNC,CPTII,S$GLB, | Performed by: STUDENT IN AN ORGANIZED HEALTH CARE EDUCATION/TRAINING PROGRAM

## 2023-12-14 PROCEDURE — 1101F PR PT FALLS ASSESS DOC 0-1 FALLS W/OUT INJ PAST YR: ICD-10-PCS | Mod: HCNC,CPTII,S$GLB, | Performed by: STUDENT IN AN ORGANIZED HEALTH CARE EDUCATION/TRAINING PROGRAM

## 2023-12-14 PROCEDURE — 1125F PR PAIN SEVERITY QUANTIFIED, PAIN PRESENT: ICD-10-PCS | Mod: HCNC,CPTII,S$GLB, | Performed by: STUDENT IN AN ORGANIZED HEALTH CARE EDUCATION/TRAINING PROGRAM

## 2023-12-14 PROCEDURE — 99999 PR PBB SHADOW E&M-EST. PATIENT-LVL III: CPT | Mod: PBBFAC,HCNC,, | Performed by: STUDENT IN AN ORGANIZED HEALTH CARE EDUCATION/TRAINING PROGRAM

## 2023-12-14 NOTE — PROGRESS NOTES
Lenore Subramanian   MRN: [id number]  : [id number]     Procedure note:    Procedure: Vaginal biopsy    A time out was performed immediately prior to procedure.    A bimanual exam was performed followed by insertion of the speculum. The vaginal lesion was identified and biopsied with Tischler forceps.    The speculum and tenaculum were removed and hemostasis was noted.    Specimen sent to pathology.    I was present and performed the entire procedure.    Berry Jackson MD

## 2023-12-14 NOTE — PROGRESS NOTES
Referring Provider:  Newton Allen MD  [address] [facility]  [place] [id number]   Subjective:      Patient ID: Lenore Subramanian is a 73 y.o. female.    Chief Complaint: No chief complaint on file.    Problem List Items Addressed This Visit          Oncology    Endometrial cancer - Primary    Overview     7/28/23: EMB FIGO G1 endometrioid endometrial carcinoma  8/29/23: RA-TLH BSO SLN. Path IB G1 endometrioid endometrial carcinoma (0/7 SLN, No LVSI, 73% MI, pMMR, ER/WV +)    Adjuvant therapy  VBT (21 Gy in 3 fractions 11/8-11/22/23) with Dr. Lucero            Endocrine    Class 1 obesity due to excess calories with serious comorbidity in adult        HPI Bruising at left lateral port site has dramatically improved. Reports some fullness and pressure in that area. Denies other symptoms.    Planning a family get together with her 3 children in [place] next week to celebrate [place].    Review of Systems   Constitutional:  Negative for chills, fatigue and fever.   Respiratory:  Negative for cough and shortness of breath.    Cardiovascular:  Negative for chest pain.   Gastrointestinal:  Negative for abdominal distention, abdominal pain, constipation and diarrhea.   Genitourinary:  Negative for dysuria, pelvic pain and vaginal bleeding.   Musculoskeletal:  Negative for back pain.   Psychiatric/Behavioral:  Negative for dysphoric mood. The patient is not nervous/anxious.      Past Medical History:   Diagnosis Date    Anxiety     Asthma     as a child    Endometrial cancer 8/15/2023    Mental disorder     Depression    HERVE (obstructive sleep apnea)       Past Surgical History:   Procedure Laterality Date    COLONOSCOPY N/A 10/25/2016    Procedure: COLONOSCOPY;  Surgeon: JANINA Dominguez MD;  Location: [address]);  Service: Endoscopy;  Laterality: N/A;    LYMPH NODE BIOPSY N/A 8/29/2023    Procedure: BIOPSY, LYMPH NODE;  Surgeon: Berry Jackson MD;  Location: [facility];  Service: OB/GYN;  Laterality:  N/A;    LYSIS OF ADHESIONS OF URETER Left 8/29/2023    Procedure: URETEROLYSIS, ROBOTIC;  Surgeon: Berry Jackson MD;  Location: [facility] OR;  Service: OB/GYN;  Laterality: Left;    MAPPING, LYMPH NODE, SENTINEL N/A 8/29/2023    Procedure: MAPPING, LYMPH NODE, SENTINEL;  Surgeon: Berry Jackson MD;  Location: [facility] OR;  Service: OB/GYN;  Laterality: N/A;    ROBOT-ASSISTED LAPAROSCOPIC ABDOMINAL HYSTERECTOMY USING DA JUAN XI N/A 8/29/2023    Procedure: XI ROBOTIC HYSTERECTOMY;  Surgeon: Berry Jackson MD;  Location: [facility] OR;  Service: OB/GYN;  Laterality: N/A;  3 hr case / MEDICARE COVERAGE MUST BE SURGERY ADMIT    ROBOT-ASSISTED LAPAROSCOPIC SALPINGO-OOPHORECTOMY USING DA JUAN XI Bilateral 8/29/2023    Procedure: XI ROBOTIC SALPINGO-OOPHORECTOMY;  Surgeon: Berry Jackson MD;  Location: [facility];  Service: OB/GYN;  Laterality: Bilateral;    TONSILLECTOMY  1958    TUBAL LIGATION  1979    PP BTL      Family History   Problem Relation Age of Onset    Hypertension Mother     Multiple myeloma Father     Colon cancer Sister 61    Hypertrophic cardiomyopathy Son     Breast cancer Maternal Aunt 72    Breast cancer Paternal Aunt 58    Diabetes Paternal Grandmother     Heart disease Neg Hx     Stroke Neg Hx     Ovarian cancer Neg Hx     Melanoma Neg Hx       Social History     Socioeconomic History    Marital status:         Objective:      Vitals:    12/14/23 1040   BP: (!) 113/57   Pulse: (!) 51        Physical Exam  Constitutional:       General: She is not in acute distress.  HENT:      Head: Normocephalic.   Eyes:      Extraocular Movements: Extraocular movements intact.      Conjunctiva/sclera: Conjunctivae normal.   Cardiovascular:      Rate and Rhythm: Normal rate.      Pulses: Normal pulses.   Pulmonary:      Effort: Pulmonary effort is normal. No respiratory distress.      Breath sounds: No wheezing.   Abdominal:      General: There is no distension.      Tenderness: There is no  abdominal tenderness. There is no guarding or rebound.   Genitourinary:     Comments: External genitalia normal. Vagina normal. Uterus, cervix, and adnexa surgically absent. 1 cm hypopigmented friable lesion at central aspect of vaginal cuff apex. Biopsied today.  Musculoskeletal:         General: No deformity.   Neurological:      Mental Status: She is alert and oriented to person, place, and time.   Psychiatric:         Mood and Affect: Mood normal.         Behavior: Behavior normal.         Thought Content: Thought content normal.         Lab Results   Component Value Date    WBC 6.45 05/09/2023    HGB 14.7 05/09/2023    HCT 45.1 05/09/2023    MCV 95 05/09/2023     05/09/2023        Assessment:       Endometrial cancer    Class 1 obesity due to excess calories with serious comorbidity and body mass index (BMI) of 30.0 to 30.9 in adult           Plan:       Endometrial carcinoma: Stage IB Grade 1-  Completed VBT 11/22/23. 1 cm hypopigmented lesion noted at apex of vaginal cuff. Suspect related to recent radiation. Biopsied today.  Reviewed that rapid recurrence of endometrial cancer in this time frame would be unusual, but biopsy could help exclude this.   Follow up path  [facility] in 3 months  Vaginal dilators supplied and instructed on use.    2. Obesity: recommend diet and exercise to achieve and maintain a healthy weight.       As part of the medical decision making process I reviewed the radiation treatment notes, and the 12/12 note from her PCP and relevant labs.        Berry Jackson MD

## 2023-12-18 LAB
FINAL PATHOLOGIC DIAGNOSIS: NORMAL
GROSS: NORMAL
Lab: NORMAL

## 2023-12-21 ENCOUNTER — TELEPHONE (OUTPATIENT)
Dept: FAMILY MEDICINE | Facility: CLINIC | Age: 73
End: 2023-12-21
Payer: MEDICARE

## 2023-12-21 NOTE — TELEPHONE ENCOUNTER
Called patient and she stated that she need her referral change to a in network provider within [facility]. I inform her that I will reach out to referrals about this and they will take care of this.

## 2023-12-21 NOTE — TELEPHONE ENCOUNTER
----- Message from Lan Castro sent at 12/21/2023 11:32 AM CST -----  Type: Patient Call Back    Who called:Self    What is the request in detail:Change referral to [facility]    Can the clinic reply by [facility]?No    Would the patient rather a call back or a response via [facility]? Call    Best call back number:.[phone] (home)       Additional Information:

## 2023-12-26 DIAGNOSIS — M79.641 RIGHT HAND PAIN: Primary | ICD-10-CM

## 2024-01-18 ENCOUNTER — HOSPITAL ENCOUNTER (OUTPATIENT)
Dept: RADIOLOGY | Facility: HOSPITAL | Age: 74
Discharge: HOME | End: 2024-01-18
Attending: FAMILY MEDICINE
Payer: MEDICARE

## 2024-01-18 ENCOUNTER — HOSPITAL ENCOUNTER (OUTPATIENT)
Dept: RADIOLOGY | Facility: CLINIC | Age: 74
Discharge: HOME | End: 2024-01-18
Attending: FAMILY MEDICINE
Payer: MEDICARE

## 2024-01-18 DIAGNOSIS — Z12.31 ENCOUNTER FOR SCREENING MAMMOGRAM FOR BREAST CANCER: ICD-10-CM

## 2024-01-18 DIAGNOSIS — Z78.0 POST-MENOPAUSE: ICD-10-CM

## 2024-01-18 PROCEDURE — 77080 DXA BONE DENSITY AXIAL: CPT | Mod: TC,HCNC,PO

## 2024-01-18 PROCEDURE — 77080 DXA BONE DENSITY AXIAL: CPT | Mod: 26,HCNC,, | Performed by: INTERNAL MEDICINE

## 2024-01-18 PROCEDURE — 77067 SCR MAMMO BI INCL CAD: CPT | Mod: 26,HCNC,, | Performed by: RADIOLOGY

## 2024-01-18 PROCEDURE — 77063 BREAST TOMOSYNTHESIS BI: CPT | Mod: 26,HCNC,, | Performed by: RADIOLOGY

## 2024-01-18 PROCEDURE — 77067 SCR MAMMO BI INCL CAD: CPT | Mod: TC,HCNC,PO

## 2024-01-25 ENCOUNTER — TELEPHONE (OUTPATIENT)
Dept: ORTHOPEDICS | Facility: CLINIC | Age: 74
End: 2024-01-25
Payer: MEDICARE

## 2024-01-25 NOTE — TELEPHONE ENCOUNTER
Spoke c pt. Confirmed  XR & appt c Dr. Randall. Advised pt that XR is located on 1st floor of [facility]. Pt expressed understanding & was thankful.

## 2024-01-30 ENCOUNTER — HOSPITAL ENCOUNTER (OUTPATIENT)
Dept: RADIOLOGY | Facility: OTHER | Age: 74
Discharge: HOME | End: 2024-01-30
Attending: ORTHOPAEDIC SURGERY
Payer: MEDICARE

## 2024-01-30 ENCOUNTER — OFFICE VISIT (OUTPATIENT)
Dept: ORTHOPEDICS | Facility: CLINIC | Age: 74
End: 2024-01-30
Payer: MEDICARE

## 2024-01-30 VITALS — BODY MASS INDEX: 30.47 KG/M2 | WEIGHT: 189.63 LBS | HEIGHT: 66 IN

## 2024-01-30 DIAGNOSIS — M65.341 TRIGGER FINGER, RIGHT RING FINGER: ICD-10-CM

## 2024-01-30 DIAGNOSIS — M79.641 RIGHT HAND PAIN: ICD-10-CM

## 2024-01-30 DIAGNOSIS — M67.449 MUCOUS CYST OF FINGER: Primary | ICD-10-CM

## 2024-01-30 PROCEDURE — 99204 OFFICE O/P NEW MOD 45 MIN: CPT | Mod: 25,HCNC,S$GLB, | Performed by: ORTHOPAEDIC SURGERY

## 2024-01-30 PROCEDURE — 73130 X-RAY EXAM OF HAND: CPT | Mod: 26,HCNC,RT, | Performed by: RADIOLOGY

## 2024-01-30 PROCEDURE — 3288F FALL RISK ASSESSMENT DOCD: CPT | Mod: HCNC,CPTII,S$GLB, | Performed by: ORTHOPAEDIC SURGERY

## 2024-01-30 PROCEDURE — 3008F BODY MASS INDEX DOCD: CPT | Mod: HCNC,CPTII,S$GLB, | Performed by: ORTHOPAEDIC SURGERY

## 2024-01-30 PROCEDURE — 73130 X-RAY EXAM OF HAND: CPT | Mod: TC,HCNC,FY,RT

## 2024-01-30 PROCEDURE — 20550 NJX 1 TENDON SHEATH/LIGAMENT: CPT | Mod: HCNC,RT,S$GLB,

## 2024-01-30 PROCEDURE — 1159F MED LIST DOCD IN RCRD: CPT | Mod: HCNC,CPTII,S$GLB, | Performed by: ORTHOPAEDIC SURGERY

## 2024-01-30 PROCEDURE — 1101F PT FALLS ASSESS-DOCD LE1/YR: CPT | Mod: HCNC,CPTII,S$GLB, | Performed by: ORTHOPAEDIC SURGERY

## 2024-01-30 PROCEDURE — 99999 PR PBB SHADOW E&M-EST. PATIENT-LVL III: CPT | Mod: PBBFAC,HCNC,, | Performed by: ORTHOPAEDIC SURGERY

## 2024-01-30 PROCEDURE — 1125F AMNT PAIN NOTED PAIN PRSNT: CPT | Mod: HCNC,CPTII,S$GLB, | Performed by: ORTHOPAEDIC SURGERY

## 2024-01-30 RX ADMIN — METHYLPREDNISOLONE ACETATE 40 MG: 40 INJECTION, SUSPENSION INTRA-ARTICULAR; INTRALESIONAL; INTRAMUSCULAR; SOFT TISSUE at 02:01

## 2024-01-30 NOTE — PROCEDURES
Tendion    Date/Time: 1/30/2024 2:15 PM    Performed by: Marcela Felix NP  Authorized by: Angela Powell MD    Consent Done?:  Yes (Verbal)  Indications:  Pain  Local anesthesia used?: Yes    Anesthesia:  Local infiltration  Anesthetic total (ml):  0.5    Location:  Ring finger  Site:  R ring flexor tendon sheath  Needle size:  25 G  Medications:  40 mg methylPREDNISolone acetate 40 mg/mL  Patient tolerance:  Patient tolerated the procedure well with no immediate complications

## 2024-01-30 NOTE — PROGRESS NOTES
[facility]  History & Physical  Orthopedics    SUBJECTIVE:     Chief Complaint:     Referring Provider:  none      History of Present Illness:  Patient is a 73 y.o. right hand dominant female who presents today with complaints of popping of right ring finger which began 6 months ago and pain sensation at right ring A1 pulley.  Denies any numbness or tingling.  She has an active bowler.  The patient denies any fevers, chills, N/V, D/C and presents for evaluation.    Review of patient's allergies indicates:   Allergen Reactions    Chlorhexidine Rash    Poison ivy [vit e-nonoxynol 9-aloe vera] Rash       Past Medical History:   Diagnosis Date    Anxiety     Asthma     as a child    Endometrial cancer 8/15/2023    Mental disorder     Depression    HERVE (obstructive sleep apnea)      Past Surgical History:   Procedure Laterality Date    COLONOSCOPY N/A 10/25/2016    Procedure: COLONOSCOPY;  Surgeon: JANINA Dominguez MD;  Location: [address]);  Service: Endoscopy;  Laterality: N/A;    LYMPH NODE BIOPSY N/A 8/29/2023    Procedure: BIOPSY, LYMPH NODE;  Surgeon: Berry Jackson MD;  Location: [facility];  Service: OB/GYN;  Laterality: N/A;    LYSIS OF ADHESIONS OF URETER Left 8/29/2023    Procedure: URETEROLYSIS, ROBOTIC;  Surgeon: Berry Jackson MD;  Location: [facility];  Service: OB/GYN;  Laterality: Left;    MAPPING, LYMPH NODE, SENTINEL N/A 8/29/2023    Procedure: MAPPING, LYMPH NODE, SENTINEL;  Surgeon: Berry Jackson MD;  Location: [facility];  Service: OB/GYN;  Laterality: N/A;    ROBOT-ASSISTED LAPAROSCOPIC ABDOMINAL HYSTERECTOMY USING DA JUAN XI N/A 8/29/2023    Procedure: XI ROBOTIC HYSTERECTOMY;  Surgeon: Berry Jackson MD;  Location: [facility];  Service: OB/GYN;  Laterality: N/A;  3 hr case / MEDICARE COVERAGE MUST BE SURGERY ADMIT    ROBOT-ASSISTED LAPAROSCOPIC SALPINGO-OOPHORECTOMY USING DA JUAN XI Bilateral 8/29/2023    Procedure: XI ROBOTIC  SALPINGO-OOPHORECTOMY;  Surgeon: Berry Jackson MD;  Location: [facility];  Service: OB/GYN;  Laterality: Bilateral;    TONSILLECTOMY  1958    TUBAL LIGATION  1979    PP BTL     Family History   Problem Relation Age of Onset    Hypertension Mother     Multiple myeloma Father     Colon cancer Sister 61    Hypertrophic cardiomyopathy Son     Breast cancer Maternal Aunt 72    Breast cancer Paternal Aunt 58    Diabetes Paternal Grandmother     Heart disease Neg Hx     Stroke Neg Hx     Ovarian cancer Neg Hx     Melanoma Neg Hx      Social History     Tobacco Use    Smoking status: Never     Passive exposure: Never    Smokeless tobacco: Never   Substance Use Topics    Alcohol use: Yes     Alcohol/week: 7.0 standard drinks of alcohol     Types: 7 Glasses of wine per week     Comment: occasionally    Drug use: No        Review of Systems:  Constitutional: Denies fever/chills  Neurological: Denies numbness/tingling (any exceptions noted in orthopaedic exam)   Psychiatric/Behavioral: Denies change in normal mood  Eyes: Denies change in vision  Cardiovascular: Denies chest pain  Respiratory: Denies shortness of breath  Hematologic/Lymphatic: Denies easy bleeding/bruising   Skin: Denies new rash or skin lesions   Gastrointestinal: Denies nausea/vomitting/diarrhea, change in bowel habits, abdominal pain   Allergic/Immunologic: Denies adverse reactions to current medications  Musculoskeletal: see HPI      OBJECTIVE:     Vital Signs (Most Recent)       Physical Exam:  Gen:  No acute distress  CV:  Peripherally well-perfused.  Pulses 2+ bilaterally.  Lungs:  Normal respiratory effort.  Abdomen:  Soft, non-tender, non-distended  Head/Neck:  Normocephalic.  Atraumatic. No TTP, AROM and PROM intact without pain  Neuro:  CN intact without deficit, SILT throughout B/L Upper & Lower Extremities    Right Hand/Wrist Examination:    Observation/Inspection:  Swelling  none    Deformity  none  Discoloration  none      Scars   none    Atrophy  none  +TTP right A1 pulley.     Neurovascular Exam:  Digits WWP, brisk CR < 3s throughout  NVI motor/LTS to M/R/U nerves, radial pulse 2+  Tinel's Test - Carpal Tunnel  Neg  Tinel's Test - Cubital Tunnel  Neg  Median Nerve Compression Test Neg    ROM hand full, painless    ROM wrist full, painless    ROM elbow full, painless    Abdomen not guarded  Respirations nonlabored  Perfusion intact    Diagnostic Results:   Xray :   Imaging - I independently viewed the patient's imaging as well as the radiology report.  Xrays of the patient's does not  demonstrate no evidence of any acute fractures or dislocations or significant degenerative changes.    EMG - none    ASSESSMENT/PLAN:     There are no diagnoses linked to this encounter.     73 y.o. yo female with right ring finer      Plan:  - Lenore was seen today for pain.    Diagnoses and all orders for this visit:    Mucous cyst of finger  Comments:  right ring finger    Trigger finger, right ring finger  Discuss pathophysiology of trigger finger.  Recommended a steroid injection today.  CSI performed.  She will follow-up in 6 weeks or message via [employer] for follow-up appointment if her symptoms continue.        Marcela Felix NP

## 2024-02-09 NOTE — PROGRESS NOTES
Patient seen and examined agree with plan discussed mucous cyst discussed excision of cyst osteophyte excision just observation versus fusion, patient also has trigger finger we discussed trigger finger treatment surgery versus injection patient is opting for injection today all questions were answered at this time

## 2024-02-21 RX ORDER — METHYLPREDNISOLONE ACETATE 40 MG/ML
40 INJECTION, SUSPENSION INTRA-ARTICULAR; INTRALESIONAL; INTRAMUSCULAR; SOFT TISSUE
Status: DISCONTINUED | OUTPATIENT
Start: 2024-01-30 | End: 2024-02-21 | Stop reason: HOSPADM

## 2024-02-28 ENCOUNTER — OFFICE VISIT (OUTPATIENT)
Dept: RADIATION ONCOLOGY | Facility: CLINIC | Age: 74
End: 2024-02-28
Payer: MEDICARE

## 2024-02-28 VITALS
TEMPERATURE: 98 F | BODY MASS INDEX: 31.78 KG/M2 | OXYGEN SATURATION: 96 % | SYSTOLIC BLOOD PRESSURE: 122 MMHG | HEART RATE: 53 BPM | DIASTOLIC BLOOD PRESSURE: 65 MMHG | WEIGHT: 196.88 LBS

## 2024-02-28 DIAGNOSIS — C54.1 ENDOMETRIAL CANCER: Primary | ICD-10-CM

## 2024-02-28 PROCEDURE — 3288F FALL RISK ASSESSMENT DOCD: CPT | Mod: HCNC,CPTII,S$GLB, | Performed by: RADIOLOGY

## 2024-02-28 PROCEDURE — 99024 POSTOP FOLLOW-UP VISIT: CPT | Mod: HCNC,S$GLB,, | Performed by: RADIOLOGY

## 2024-02-28 PROCEDURE — 1126F AMNT PAIN NOTED NONE PRSNT: CPT | Mod: HCNC,CPTII,S$GLB, | Performed by: RADIOLOGY

## 2024-02-28 PROCEDURE — 1101F PT FALLS ASSESS-DOCD LE1/YR: CPT | Mod: HCNC,CPTII,S$GLB, | Performed by: RADIOLOGY

## 2024-02-28 PROCEDURE — 99999 PR PBB SHADOW E&M-EST. PATIENT-LVL II: CPT | Mod: PBBFAC,HCNC,, | Performed by: RADIOLOGY

## 2024-02-28 PROCEDURE — 3078F DIAST BP <80 MM HG: CPT | Mod: HCNC,CPTII,S$GLB, | Performed by: RADIOLOGY

## 2024-02-28 PROCEDURE — 3074F SYST BP LT 130 MM HG: CPT | Mod: HCNC,CPTII,S$GLB, | Performed by: RADIOLOGY

## 2024-02-28 NOTE — PROGRESS NOTES
DEPARTMENT OF RADIATION ONCOLOGY  FOLLOW-UP NOTE        Patient Name: Lenore Subramanian  MRN: [id number]  : 1950    DIAGNOSIS:  Cancer Staging   Endometrial cancer  Staging form: Corpus Uteri - Carcinoma and Carcinosarcoma, AJCC 8th Edition  - Pathologic stage from 2023: FIGO Stage IB (pT1b, pN0(sn), cM0) - Signed by Vannessa Lucero MD on 10/16/2023      PATIENT IDENTIFICATION: The patient is a 73-year-old woman with a FIGO stage IB endometrial cancer.  She is status post TAHBSO and  vaginal cuff brachytherapy.       The patient presented postmenopausal bleeding.  She underwent endometrial biopsy on 2023 which revealed a FIGO grade 1 endometrial endometrioid adenocarcinoma.     The patient was referred to Dr. Jackson for surgical management.  She was taken to the operating room on 2023 for surgical resection.  Final pathology confirmed the presence of a FIGO grade 1 endometrial endometrioid adenocarcinoma with 73% myometrial invasion.  The tumor measured 3 cm in greatest dimension.  There was no lymphovascular invasion.  Seven pelvic lymph nodes were negative for evidence of metastatic carcinoma.    RADIATION: Iridium 192, high dose rate  Dose: 21 Gy in 3 fractions  Treatment completed: 23    HISTORY OF PRESENT ILLNESS: Ms. Subramanian returns to clinic today for routine post-radiation follow-up.  The patient is doing well post brachytherapy.  She uses vaginal dilator regularly.  She denies GI or  complaints.    Oncology History   Endometrial cancer   8/15/2023 Initial Diagnosis    Endometrial cancer     2023 Cancer Staged    Staging form: Corpus Uteri - Carcinoma and Carcinosarcoma, AJCC 8th Edition  - Pathologic stage from 2023: FIGO Stage IB (pT1b, pN0(sn), cM0)     2023 Surgery    Surgeon: Dr. Berry Jackson  Robotic Hysterectomy     2023 - 2023 Radiation Therapy    Treating physician: Dr. Vannessa Lucero  Treatment Summary  Course: C1 pelvis  2023    Treatment Site Ref. ID Energy Dose/Fx (Gy) #Fx Dose Correction (Gy) Total Dose (Gy) Start Date End Date Elapsed Days   VagCyl 3.0cm 0.5cm 4X 7 3 / 3 0 21 11/8/2023 11/22/2023 14          REVIEW OF SYSTEMS:   Review of Systems   Constitutional:  Positive for malaise/fatigue. Negative for fever and weight loss.   HENT:  Negative for ear pain, hearing loss, sinus pain and sore throat.    Eyes:  Negative for blurred vision, double vision and pain.   Respiratory:  Negative for cough, hemoptysis, shortness of breath and wheezing.    Cardiovascular:  Negative for chest pain, palpitations and leg swelling.   Gastrointestinal:  Negative for abdominal pain, blood in stool, constipation, diarrhea, heartburn, nausea and vomiting.   Genitourinary:  Negative for dysuria, frequency, hematuria and urgency.   Musculoskeletal:  Positive for joint pain. Negative for back pain.   Skin:  Negative for itching and rash.   Neurological:  Negative for tingling, focal weakness, seizures and headaches.   Psychiatric/Behavioral:  Negative for depression. The patient is not nervous/anxious.          ALLERGIES:   Review of patient's allergies indicates:   Allergen Reactions    Chlorhexidine Rash    Poison ivy [vit e-nonoxynol 9-aloe vera] Rash       MEDICATIONS:  Current Outpatient Medications   Medication Sig    acetaminophen (TYLENOL) 650 MG TbSR Take 1 tablet (650 mg total) by mouth every 6 (six) hours as needed.    albuterol (PROVENTIL/VENTOLIN HFA) 90 mcg/actuation inhaler Inhale 2 puffs into the lungs every 6 (six) hours as needed for Wheezing. Rescue    aspirin (ECOTRIN) 81 MG EC tablet Take 81 mg by mouth once daily.    calcium carbonate/vitamin D3 (CALCIUM WITH VITAMIN D3 ORAL) Take by mouth.    cetirizine (ZYRTEC) 10 MG tablet TAKE 1 TABLET(10 MG) BY MOUTH EVERY DAY    EScitalopram oxalate (LEXAPRO) 20 MG tablet Take 1 tablet (20 mg total) by mouth once daily.    FLAXSEED OIL ORAL Take by mouth.    fluticasone propionate  (FLONASE) 50 mcg/actuation nasal spray USE 1 TO 2 SPRAYS IN EACH NOSTRIL ONCE DAILY    glucosamine-chondroitin 500-400 mg tablet Take 1 tablet by mouth 3 (three) times daily.    omega-3 fatty acids/fish oil (FISH OIL-OMEGA-3 FATTY ACIDS) 300-1,000 mg capsule Take by mouth once daily.    valACYclovir (VALTREX) 1000 MG tablet Take 1 tablet (1,000 mg total) by mouth daily as needed.    vit A/C/E ac/ZnOx/cupric oxide (EYE VITAMIN AND MINERALS ORAL) Take by mouth.     No current facility-administered medications for this visit.           PHYSICAL EXAMINATION:  Vitals:    24 1259   BP: 122/65   Pulse: (!) 53   Temp: 97.7 °F (36.5 °C)     ECO  Body mass index is 31.78 kg/m².   Physical Exam  Constitutional:       General: She is not in acute distress.     Appearance: Normal appearance. She is normal weight. She is not ill-appearing.   HENT:      Head: Normocephalic and atraumatic.      Nose: Nose normal.   Pulmonary:      Effort: Pulmonary effort is normal.   Musculoskeletal:         General: Normal range of motion.   Neurological:      Mental Status: She is alert.   Skin:     General: Skin is warm and dry.         ASSESSMENT/PLAN:  Lenore was seen today for follow-up.    Diagnoses and all orders for this visit:    Endometrial cancer    The patient tolerated vaginal cuff brachytherapy well.  She is using vaginal dilator regularly.  Keep follow up with Dr Pan. RTC as needed in the future.

## 2024-03-18 ENCOUNTER — TELEPHONE (OUTPATIENT)
Dept: GYNECOLOGIC ONCOLOGY | Facility: CLINIC | Age: 74
End: 2024-03-18
Payer: MEDICARE

## 2024-03-18 NOTE — TELEPHONE ENCOUNTER
Pt confirmed next avail appt 03/21/24 3:15pm with Dr. Jackson [facility] gyn-onc location. Pt verbalized understanding. Appt letter mailed.     ----- Message from Andres Natarajan MA sent at 3/18/2024  9:32 AM CDT -----  Hi, see below. Thanks Andres   ----- Message -----  From: Doreen Woodruff RN  Sent: 3/18/2024   8:37 AM CDT  To: Andres Natarajan MA    Please reschedule. No show on 3/14.  ----- Message -----  From: Nhan Cuevas  Sent: 3/15/2024   4:50 PM CDT  To: Berry Jackson Staff    Type:  Patient Returning Call    Who Called:pt  Who Left Message for Patient:  Does the patient know what this is regarding?:appt reschedule   Would the patient rather a call back or a response via [facility]? Call  Best Call Back Number: [phone]  Additional Information: pt states she would like a call from office to reschedule appt. Thank you

## 2024-03-21 ENCOUNTER — OFFICE VISIT (OUTPATIENT)
Dept: GYNECOLOGIC ONCOLOGY | Facility: CLINIC | Age: 74
End: 2024-03-21
Payer: MEDICARE

## 2024-03-21 VITALS
HEIGHT: 66 IN | BODY MASS INDEX: 30.69 KG/M2 | SYSTOLIC BLOOD PRESSURE: 125 MMHG | HEART RATE: 56 BPM | DIASTOLIC BLOOD PRESSURE: 67 MMHG | WEIGHT: 190.94 LBS

## 2024-03-21 DIAGNOSIS — C54.1 ENDOMETRIAL CANCER: Primary | ICD-10-CM

## 2024-03-21 PROCEDURE — 3008F BODY MASS INDEX DOCD: CPT | Mod: HCNC,CPTII,S$GLB, | Performed by: STUDENT IN AN ORGANIZED HEALTH CARE EDUCATION/TRAINING PROGRAM

## 2024-03-21 PROCEDURE — 3078F DIAST BP <80 MM HG: CPT | Mod: HCNC,CPTII,S$GLB, | Performed by: STUDENT IN AN ORGANIZED HEALTH CARE EDUCATION/TRAINING PROGRAM

## 2024-03-21 PROCEDURE — 1101F PT FALLS ASSESS-DOCD LE1/YR: CPT | Mod: HCNC,CPTII,S$GLB, | Performed by: STUDENT IN AN ORGANIZED HEALTH CARE EDUCATION/TRAINING PROGRAM

## 2024-03-21 PROCEDURE — 1126F AMNT PAIN NOTED NONE PRSNT: CPT | Mod: HCNC,CPTII,S$GLB, | Performed by: STUDENT IN AN ORGANIZED HEALTH CARE EDUCATION/TRAINING PROGRAM

## 2024-03-21 PROCEDURE — 3288F FALL RISK ASSESSMENT DOCD: CPT | Mod: HCNC,CPTII,S$GLB, | Performed by: STUDENT IN AN ORGANIZED HEALTH CARE EDUCATION/TRAINING PROGRAM

## 2024-03-21 PROCEDURE — 1159F MED LIST DOCD IN RCRD: CPT | Mod: HCNC,CPTII,S$GLB, | Performed by: STUDENT IN AN ORGANIZED HEALTH CARE EDUCATION/TRAINING PROGRAM

## 2024-03-21 PROCEDURE — 99214 OFFICE O/P EST MOD 30 MIN: CPT | Mod: HCNC,S$GLB,, | Performed by: STUDENT IN AN ORGANIZED HEALTH CARE EDUCATION/TRAINING PROGRAM

## 2024-03-21 PROCEDURE — G2211 COMPLEX E/M VISIT ADD ON: HCPCS | Mod: HCNC,S$GLB,, | Performed by: STUDENT IN AN ORGANIZED HEALTH CARE EDUCATION/TRAINING PROGRAM

## 2024-03-21 PROCEDURE — 99999 PR PBB SHADOW E&M-EST. PATIENT-LVL III: CPT | Mod: PBBFAC,HCNC,, | Performed by: STUDENT IN AN ORGANIZED HEALTH CARE EDUCATION/TRAINING PROGRAM

## 2024-03-21 PROCEDURE — 3074F SYST BP LT 130 MM HG: CPT | Mod: HCNC,CPTII,S$GLB, | Performed by: STUDENT IN AN ORGANIZED HEALTH CARE EDUCATION/TRAINING PROGRAM

## 2024-03-21 NOTE — PROGRESS NOTES
Referring Provider:  Tho Nunez MD    Subjective:      Patient ID: Lenore Subramanian is a 73 y.o. female.    Chief Complaint: No chief complaint on file.    Problem List Items Addressed This Visit          Oncology    Endometrial cancer - Primary    Overview     7/28/23: EMB FIGO G1 endometrioid endometrial carcinoma  8/29/23: RA-TLH BSO SLN. Path IB G1 endometrioid endometrial carcinoma (0/7 SLN, No LVSI, 73% MI, pMMR, ER/MD +)    Adjuvant therapy  VBT (21 Gy in 3 fractions 11/8-11/22/23) with Dr. Lucero                 HPI Denies vaginal bleeding, abdominal pain, distention, or changes in bowel habits. She is doing well and has started a new exercise regimen.    Review of Systems   Constitutional:  Negative for chills, fatigue and fever.   Respiratory:  Negative for cough and shortness of breath.    Cardiovascular:  Negative for chest pain.   Gastrointestinal:  Negative for abdominal distention, abdominal pain, constipation and diarrhea.   Genitourinary:  Negative for dysuria, pelvic pain and vaginal bleeding.   Musculoskeletal:  Negative for back pain.   Psychiatric/Behavioral:  Negative for dysphoric mood. The patient is not nervous/anxious.      Past Medical History:   Diagnosis Date    Anxiety     Asthma     as a child    Endometrial cancer 8/15/2023    Mental disorder     Depression    HERVE (obstructive sleep apnea)       Past Surgical History:   Procedure Laterality Date    COLONOSCOPY N/A 10/25/2016    Procedure: COLONOSCOPY;  Surgeon: JANINA Dominguez MD;  Location: [address];  Service: Endoscopy;  Laterality: N/A;    LYMPH NODE BIOPSY N/A 8/29/2023    Procedure: BIOPSY, LYMPH NODE;  Surgeon: Berry Jackson MD;  Location: [facility];  Service: OB/GYN;  Laterality: N/A;    LYSIS OF ADHESIONS OF URETER Left 8/29/2023    Procedure: URETEROLYSIS, ROBOTIC;  Surgeon: Berry Jackson MD;  Location: [facility];  Service: OB/GYN;  Laterality: Left;    MAPPING, LYMPH NODE, SENTINEL N/A 8/29/2023     Procedure: MAPPING, LYMPH NODE, SENTINEL;  Surgeon: Berry Jackson MD;  Location: [facility] OR;  Service: OB/GYN;  Laterality: N/A;    ROBOT-ASSISTED LAPAROSCOPIC ABDOMINAL HYSTERECTOMY USING DA JUAN XI N/A 8/29/2023    Procedure: XI ROBOTIC HYSTERECTOMY;  Surgeon: Berry Jackson MD;  Location: [facility] OR;  Service: OB/GYN;  Laterality: N/A;  3 hr case / MEDICARE COVERAGE MUST BE SURGERY ADMIT    ROBOT-ASSISTED LAPAROSCOPIC SALPINGO-OOPHORECTOMY USING DA JUAN XI Bilateral 8/29/2023    Procedure: XI ROBOTIC SALPINGO-OOPHORECTOMY;  Surgeon: Berry Jackson MD;  Location: [facility] OR;  Service: OB/GYN;  Laterality: Bilateral;    TONSILLECTOMY  1958    TUBAL LIGATION  1979    PP BTL      Family History   Problem Relation Age of Onset    Hypertension Mother     Multiple myeloma Father     Colon cancer Sister 61    Hypertrophic cardiomyopathy Son     Breast cancer Maternal Aunt 72    Breast cancer Paternal Aunt 58    Diabetes Paternal Grandmother     Heart disease Neg Hx     Stroke Neg Hx     Ovarian cancer Neg Hx     Melanoma Neg Hx       Social History     Socioeconomic History    Marital status:         Objective:      Vitals:    03/21/24 1502   BP: 125/67   Pulse: (!) 56        Physical Exam  Constitutional:       General: She is not in acute distress.  HENT:      Head: Normocephalic.   Eyes:      Extraocular Movements: Extraocular movements intact.      Conjunctiva/sclera: Conjunctivae normal.   Cardiovascular:      Rate and Rhythm: Normal rate.      Pulses: Normal pulses.   Pulmonary:      Effort: Pulmonary effort is normal. No respiratory distress.      Breath sounds: No wheezing.   Abdominal:      General: There is no distension.      Tenderness: There is no abdominal tenderness. There is no guarding or rebound.   Genitourinary:     Comments: External genitalia normal. Vagina normal. Uterus, cervix, and adnexa surgically absent. A female staff member was present for the exam.  Musculoskeletal:          General: No deformity.   Neurological:      Mental Status: She is alert and oriented to person, place, and time.   Psychiatric:         Mood and Affect: Mood normal.         Behavior: Behavior normal.         Thought Content: Thought content normal.         Lab Results   Component Value Date    WBC 6.45 05/09/2023    HGB 14.7 05/09/2023    HCT 45.1 05/09/2023    MCV 95 05/09/2023     05/09/2023        Assessment:       Endometrial cancer             Plan:       Endometrial carcinoma: Stage IB Grade 1-  s/p RA TLH BSO SLN on 8/29/23. Completed VBT 11/22/23. Denies new symptoms. Exam with no evidence of disease.  [facility] in 6 months and then yearly thereafter (with alternating visits with Dr. Nunez in the between).    2. Obesity: recommend diet and exercise to achieve and maintain a healthy weight. She has started a new exercise program.      As part of the medical decision making process I reviewed the radiation follow up note from 2/28 and the hand surgery progress and procedure notes from 1/30.    Visit today is associated with current or anticipated ongoing medical care related to this patient's single serious condition/complex condition: endometrial carcinoma.           Berry Jackson MD

## 2024-03-21 NOTE — Clinical Note
Tho, thanks again for sending Lenore to see me. As you recall, I did a robotic hysterectomy BSO, and SLN dissection for her in August. Path showed a stage IB grade 1 endometrial carcinoma and she has completed adjuvant vaginal cuff brachytherapy. I'm planning to see her back in 6 months for surveillance. After that time, would you be willing to alternate q 6 months visits? We could each see her once per year so she can stay connected to each of us. If you ever have any questions or concerns, I'd be happy to get her in to see me between visits.  Please feel free to reach out anytime with questions or referrals, and I will always work to make sure I get patients seen as quickly as possible.  Thanks, Berry Jackson Cell: [phone]

## 2024-03-26 ENCOUNTER — TELEPHONE (OUTPATIENT)
Dept: PULMONOLOGY | Facility: CLINIC | Age: 74
End: 2024-03-26
Payer: MEDICARE

## 2024-03-26 ENCOUNTER — TELEPHONE (OUTPATIENT)
Dept: FAMILY MEDICINE | Facility: CLINIC | Age: 74
End: 2024-03-26
Payer: MEDICARE

## 2024-03-26 DIAGNOSIS — G47.33 OSA (OBSTRUCTIVE SLEEP APNEA): Primary | ICD-10-CM

## 2024-03-26 NOTE — TELEPHONE ENCOUNTER
----- Message from Johan Sharp sent at 3/26/2024 11:10 AM CDT -----   Name of Who is Calling:     What is the request in detail:  patient request call back in reference to schedule consult appointment / referred by  NORM OLSON      Please contact to further discuss and advise      Can the clinic reply by [facility]:     What Number to Call Back if not in [facility]:   [phone]

## 2024-03-26 NOTE — TELEPHONE ENCOUNTER
----- Message from Hailey Cheng sent at 3/26/2024  9:15 AM CDT -----  .Type: Patient Call Back    Who called: Self     What is the request in detail: Requesting a referral to Pulmonary. Stated she need a new / updated sleep study    Can the clinic reply by [facility]? No     Would the patient rather a call back or a response via [facility]? Call Back     Best call back number: .[phone] (home)       Additional Information:

## 2024-03-26 NOTE — TELEPHONE ENCOUNTER
I called Mrs Subramanian and she has a referral to see Dr Guzman . She tells me she has sleep apnea and her cpap is 12 years old She would like to go to the [facility] location but will come to [facility] if a sooner appointment is available. I told Mrs Subramanian I will speak to Dr Guzman  and call her tomorrow. She agreed. Ericak Reynaga

## 2024-03-26 NOTE — TELEPHONE ENCOUNTER
Spoke with patient. Referral was placed and patient was given number for sleep study to schedule.  Patient verbalized understanding

## 2024-03-27 ENCOUNTER — TELEPHONE (OUTPATIENT)
Dept: PULMONOLOGY | Facility: CLINIC | Age: 74
End: 2024-03-27
Payer: MEDICARE

## 2024-03-27 NOTE — TELEPHONE ENCOUNTER
----- Message from Angelia Reynolds sent at 3/27/2024 10:05 AM CDT -----  Regarding: Appt  Contact: pt@ [phone]  Pt is calling to speak to someone in the office to scheduled appt  as soon as possible . Please call to advise.pt @[phone] Thanks.

## 2024-03-27 NOTE — TELEPHONE ENCOUNTER
I called Mrs Subramanian and voicemail answered. I let her know that I see a visit with Dr Kirby tomorrow at the [facility] for her. I urged her to keep the appointment as Dr Guzman has limited availability. Ericka Reynaga

## 2024-03-28 ENCOUNTER — OFFICE VISIT (OUTPATIENT)
Dept: PULMONOLOGY | Facility: CLINIC | Age: 74
End: 2024-03-28
Payer: MEDICARE

## 2024-03-28 VITALS
OXYGEN SATURATION: 96 % | HEART RATE: 60 BPM | DIASTOLIC BLOOD PRESSURE: 70 MMHG | HEIGHT: 66 IN | SYSTOLIC BLOOD PRESSURE: 102 MMHG | BODY MASS INDEX: 31.2 KG/M2 | WEIGHT: 194.13 LBS

## 2024-03-28 DIAGNOSIS — G47.33 OSA (OBSTRUCTIVE SLEEP APNEA): ICD-10-CM

## 2024-03-28 PROCEDURE — 3288F FALL RISK ASSESSMENT DOCD: CPT | Mod: HCNC,CPTII,S$GLB, | Performed by: INTERNAL MEDICINE

## 2024-03-28 PROCEDURE — 1126F AMNT PAIN NOTED NONE PRSNT: CPT | Mod: HCNC,CPTII,S$GLB, | Performed by: INTERNAL MEDICINE

## 2024-03-28 PROCEDURE — 99203 OFFICE O/P NEW LOW 30 MIN: CPT | Mod: HCNC,S$GLB,, | Performed by: INTERNAL MEDICINE

## 2024-03-28 PROCEDURE — 1159F MED LIST DOCD IN RCRD: CPT | Mod: HCNC,CPTII,S$GLB, | Performed by: INTERNAL MEDICINE

## 2024-03-28 PROCEDURE — 3078F DIAST BP <80 MM HG: CPT | Mod: HCNC,CPTII,S$GLB, | Performed by: INTERNAL MEDICINE

## 2024-03-28 PROCEDURE — 99999 PR PBB SHADOW E&M-EST. PATIENT-LVL IV: CPT | Mod: PBBFAC,HCNC,, | Performed by: INTERNAL MEDICINE

## 2024-03-28 PROCEDURE — 3008F BODY MASS INDEX DOCD: CPT | Mod: HCNC,CPTII,S$GLB, | Performed by: INTERNAL MEDICINE

## 2024-03-28 PROCEDURE — 3074F SYST BP LT 130 MM HG: CPT | Mod: HCNC,CPTII,S$GLB, | Performed by: INTERNAL MEDICINE

## 2024-03-28 PROCEDURE — 1101F PT FALLS ASSESS-DOCD LE1/YR: CPT | Mod: HCNC,CPTII,S$GLB, | Performed by: INTERNAL MEDICINE

## 2024-03-28 NOTE — PATIENT INSTRUCTIONS
Please call [facility] at [phone] if you don't get a call in 7-10 working days.

## 2024-03-28 NOTE — PROGRESS NOTES
Lenore Subramanian  was seen as follow up.  Our last encounter was 2017.      CHIEF COMPLAINT:    Chief Complaint   Patient presents with    Apnea       HISTORY OF PRESENT ILLNESS: Lenore Subramanian is a 74 y.o. female is here for sleep evaluation.   Patient was diagnosed with chadd in 2008 at [facility].  Patient was seen by DAVIE Bolden and last appointment was 4/24/17.  Patient was provided new cpap unit 4/17.  Our first encounter was 8/18/17.  At that time, patient was doing well with cpap of 8 cm H20 with nasal pillow. No issue with cpap.  With cpap, patient denied apnea, snoring.  Feeling rested upon awake.      Currently with ResMed cpap.      [place] Sleepiness Scale score during initial sleep evaluation was 11.    SLEEP ROUTINE:  Activity the hour prior to sleep:   Hygiene and read    Bed partner:    Time to bed:  9-10 pm   Lights off:  yes  Sleep onset latency:  5-10 minutes        Disruptions or awakenings:    0-1 times    Wakeup time:      7 am   Perceived sleep quality:  rested       Daytime naps:      45 minutes in afternoon  Weekend sleep routine:      same  Caffeine use: 2 cups of coffee in am   exercise habit:   none      PAST MEDICAL HISTORY:    Active Ambulatory Problems     Diagnosis Date Noted    Depression with anxiety     Chronic rhinitis 07/31/2015    Oral herpes simplex, not currently active 07/31/2015    Primary osteoarthritis involving multiple joints 07/31/2015    Class 1 obesity due to excess calories with serious comorbidity in adult 07/31/2015    Family history of diabetes mellitus 07/31/2015    CHADD (obstructive sleep apnea) 05/03/2017    Endometrial cancer 08/15/2023    S/P RA-TLH/BSO/SLNB 08/29/2023     Resolved Ambulatory Problems     Diagnosis Date Noted    Microhematuria 07/31/2015    Screening 10/25/2016     Past Medical History:   Diagnosis Date    Anxiety     Asthma     Mental disorder                 PAST SURGICAL HISTORY:    Past Surgical History:   Procedure Laterality Date     COLONOSCOPY N/A 10/25/2016    Procedure: COLONOSCOPY;  Surgeon: JANINA Dominguez MD;  Location: [address]);  Service: Endoscopy;  Laterality: N/A;    LYMPH NODE BIOPSY N/A 8/29/2023    Procedure: BIOPSY, LYMPH NODE;  Surgeon: Berry Jackson MD;  Location: [facility];  Service: OB/GYN;  Laterality: N/A;    LYSIS OF ADHESIONS OF URETER Left 8/29/2023    Procedure: URETEROLYSIS, ROBOTIC;  Surgeon: Berry Jackson MD;  Location: [facility];  Service: OB/GYN;  Laterality: Left;    MAPPING, LYMPH NODE, SENTINEL N/A 8/29/2023    Procedure: MAPPING, LYMPH NODE, SENTINEL;  Surgeon: Berry Jackson MD;  Location: [facility];  Service: OB/GYN;  Laterality: N/A;    ROBOT-ASSISTED LAPAROSCOPIC ABDOMINAL HYSTERECTOMY USING DA JUAN XI N/A 8/29/2023    Procedure: XI ROBOTIC HYSTERECTOMY;  Surgeon: Berry Jackson MD;  Location: [facility];  Service: OB/GYN;  Laterality: N/A;  3 hr case / MEDICARE COVERAGE MUST BE SURGERY ADMIT    ROBOT-ASSISTED LAPAROSCOPIC SALPINGO-OOPHORECTOMY USING DA JUAN XI Bilateral 8/29/2023    Procedure: XI ROBOTIC SALPINGO-OOPHORECTOMY;  Surgeon: Berry Jackson MD;  Location: [facility];  Service: OB/GYN;  Laterality: Bilateral;    TONSILLECTOMY  1958    TUBAL LIGATION  1979    PP BTL         FAMILY HISTORY:                Family History   Problem Relation Age of Onset    Hypertension Mother     Multiple myeloma Father     Colon cancer Sister 61    Hypertrophic cardiomyopathy Son     Breast cancer Maternal Aunt 72    Breast cancer Paternal Aunt 58    Diabetes Paternal Grandmother     Heart disease Neg Hx     Stroke Neg Hx     Ovarian cancer Neg Hx     Melanoma Neg Hx        SOCIAL HISTORY:          Tobacco:   Social History     Tobacco Use   Smoking Status Never    Passive exposure: Never   Smokeless Tobacco Never       alcohol use:    Social History     Substance and Sexual Activity   Alcohol Use Yes    Alcohol/week: 7.0 standard drinks of alcohol    Types: 7 Glasses of wine  per week    Comment: occasionally                 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    ALLERGIES:    Review of patient's allergies indicates:   Allergen Reactions    Poison ivy [vit e-nonoxynol 9-aloe vera] Rash       CURRENT MEDICATIONS:    Current Outpatient Medications   Medication Sig Dispense Refill    acetaminophen (TYLENOL) 650 MG TbSR Take 1 tablet (650 mg total) by mouth every 6 (six) hours as needed. 30 tablet 3    albuterol (PROVENTIL/VENTOLIN HFA) 90 mcg/actuation inhaler Inhale 2 puffs into the lungs every 6 (six) hours as needed for Wheezing. Rescue 18 g 0    aspirin (ECOTRIN) 81 MG EC tablet Take 81 mg by mouth once daily.      calcium carbonate/vitamin D3 (CALCIUM WITH VITAMIN D3 ORAL) Take by mouth.      cetirizine (ZYRTEC) 10 MG tablet TAKE 1 TABLET(10 MG) BY MOUTH EVERY DAY 90 tablet 0    EScitalopram oxalate (LEXAPRO) 20 MG tablet Take 1 tablet (20 mg total) by mouth once daily. 90 tablet 3    FLAXSEED OIL ORAL Take by mouth.      fluticasone propionate (FLONASE) 50 mcg/actuation nasal spray USE 1 TO 2 SPRAYS IN EACH NOSTRIL ONCE DAILY 48 g 5    glucosamine-chondroitin 500-400 mg tablet Take 1 tablet by mouth 3 (three) times daily.      omega-3 fatty acids/fish oil (FISH OIL-OMEGA-3 FATTY ACIDS) 300-1,000 mg capsule Take by mouth once daily.      valACYclovir (VALTREX) 1000 MG tablet Take 1 tablet (1,000 mg total) by mouth daily as needed. 30 tablet 6    vit A/C/E ac/ZnOx/cupric oxide (EYE VITAMIN AND MINERALS ORAL) Take by mouth.       No current facility-administered medications for this visit.                  REVIEW OF SYSTEMS:     Sleep related symptoms as per HPI.  CONST:Denies weight gain    HEENT: Denies sinus congestion  PULM: Denies dyspnea  CARD:  Denies palpitations   GI:  Denies acid reflux  : Denies polyuria  NEURO: Denies headaches  PSYCH: Denies mood disturbance  HEME: Denies anemia   Otherwise, a balance of systems reviewed is negative.   "        PHYSICAL EXAM:  Vitals:    03/28/24 0956   BP: 102/70   Pulse: 60   SpO2: 96%   Weight: 88 kg (194 lb 1.8 oz)   Height: 5' 6" (1.676 m)   PainSc: 0-No pain     Body mass index is 31.33 kg/m².     GENERAL: Normal development, well groomed  HEENT:  Conjunctivae are non-erythematous; Pupils equal, round, and reactive to light; Nose is symmetrical; Nasal mucosa is pink and moist; Septum is midline; Inferior turbinates are normal; Nasal airflow is normal; Posterior pharynx is pink; Modified Mallampati: 3; Posterior palate is normal; Tonsils +1; Uvula is normal and pink;Tongue is normal; Dentition is fair; No TMJ tenderness; Jaw opening and protrusion without click and without discomfort.  NECK: Supple. Neck circumference is 15 inches. No [id number]. No palpable nodes.     SKIN: On face and neck: No abrasions, no rashes, no lesions.  No subcutaneous nodules are palpable.  RESPIRATORY: Chest is clear to auscultation.  Normal chest expansion and non-labored breathing at rest.  CARDIOVASCULAR: Normal S1, S2.  No murmurs, gallops or rubs. No carotid bruits bilaterally.  EXTREMITIES: No edema. No clubbing. No cyanosis. Station normal. Gait normal.        NEURO/PSYCH: Oriented to time, place and person. Normal attention span and concentration. Affect is full. Mood is normal.                                              DATA   PSG AHI 14.8/low sat 86%  Titration study 5/27/08---effective cpap 8cm    Lab Results   Component Value Date    TSH 3.343 05/09/2023     ASSESSMENT    Problem List Items Addressed This Visit       HERVE (obstructive sleep apnea)    Overview     Ahi of 15  Currently with ResMed cpap of 8 cm H20  29/30>4 hours.  Residual ahi of 4.5.  patient is benefiting from cpap  Current cpap is 7 years old.  Machine with message that motor life exceeded.    Will set up with new apap         Relevant Orders    CPAP FOR HOME USE          Education: During our discussion today, we talked about the etiology of " obstructive sleep apnea as well as the potential ramifications of untreated sleep apnea, which could include daytime sleepiness, hypertension, heart disease and/or stroke.     Precautions: The patient was advised to abstain from driving should they feel sleepy or drowsy.         Patient will No follow-ups on file. with md/np.    Please cc note to  Newton Allen MD.    40 minutes of total time spent on the encounter, which includes face to face time and non-face to face time preparing to see the patient (eg, review of tests), Obtaining and/or reviewing separately obtained history, documenting clinical information in the electronic or other health record, independently interpreting results (not separately reported) and communicating results to the patient/family/caregiver, or Care coordination (not separately reported).

## 2024-04-11 ENCOUNTER — OFFICE VISIT (OUTPATIENT)
Dept: GYNECOLOGIC ONCOLOGY | Facility: CLINIC | Age: 74
End: 2024-04-11
Payer: MEDICARE

## 2024-04-11 VITALS
DIASTOLIC BLOOD PRESSURE: 62 MMHG | SYSTOLIC BLOOD PRESSURE: 129 MMHG | BODY MASS INDEX: 30.85 KG/M2 | WEIGHT: 191.13 LBS | HEART RATE: 56 BPM

## 2024-04-11 DIAGNOSIS — C54.1 ENDOMETRIAL CANCER: Primary | ICD-10-CM

## 2024-04-11 PROCEDURE — 3008F BODY MASS INDEX DOCD: CPT | Mod: HCNC,CPTII,S$GLB, | Performed by: STUDENT IN AN ORGANIZED HEALTH CARE EDUCATION/TRAINING PROGRAM

## 2024-04-11 PROCEDURE — 3078F DIAST BP <80 MM HG: CPT | Mod: HCNC,CPTII,S$GLB, | Performed by: STUDENT IN AN ORGANIZED HEALTH CARE EDUCATION/TRAINING PROGRAM

## 2024-04-11 PROCEDURE — 3288F FALL RISK ASSESSMENT DOCD: CPT | Mod: HCNC,CPTII,S$GLB, | Performed by: STUDENT IN AN ORGANIZED HEALTH CARE EDUCATION/TRAINING PROGRAM

## 2024-04-11 PROCEDURE — G2211 COMPLEX E/M VISIT ADD ON: HCPCS | Mod: HCNC,S$GLB,, | Performed by: STUDENT IN AN ORGANIZED HEALTH CARE EDUCATION/TRAINING PROGRAM

## 2024-04-11 PROCEDURE — 3074F SYST BP LT 130 MM HG: CPT | Mod: HCNC,CPTII,S$GLB, | Performed by: STUDENT IN AN ORGANIZED HEALTH CARE EDUCATION/TRAINING PROGRAM

## 2024-04-11 PROCEDURE — 1159F MED LIST DOCD IN RCRD: CPT | Mod: HCNC,CPTII,S$GLB, | Performed by: STUDENT IN AN ORGANIZED HEALTH CARE EDUCATION/TRAINING PROGRAM

## 2024-04-11 PROCEDURE — 1125F AMNT PAIN NOTED PAIN PRSNT: CPT | Mod: HCNC,CPTII,S$GLB, | Performed by: STUDENT IN AN ORGANIZED HEALTH CARE EDUCATION/TRAINING PROGRAM

## 2024-04-11 PROCEDURE — 99213 OFFICE O/P EST LOW 20 MIN: CPT | Mod: HCNC,S$GLB,, | Performed by: STUDENT IN AN ORGANIZED HEALTH CARE EDUCATION/TRAINING PROGRAM

## 2024-04-11 PROCEDURE — 1101F PT FALLS ASSESS-DOCD LE1/YR: CPT | Mod: HCNC,CPTII,S$GLB, | Performed by: STUDENT IN AN ORGANIZED HEALTH CARE EDUCATION/TRAINING PROGRAM

## 2024-04-11 PROCEDURE — 99459 PELVIC EXAMINATION: CPT | Mod: HCNC,S$GLB,, | Performed by: STUDENT IN AN ORGANIZED HEALTH CARE EDUCATION/TRAINING PROGRAM

## 2024-04-11 PROCEDURE — 99999 PR PBB SHADOW E&M-EST. PATIENT-LVL III: CPT | Mod: PBBFAC,HCNC,, | Performed by: STUDENT IN AN ORGANIZED HEALTH CARE EDUCATION/TRAINING PROGRAM

## 2024-04-11 NOTE — PROGRESS NOTES
Referring Provider:  Tho Nunez MD    Subjective:      Patient ID: Lenore Subramanian is a 74 y.o. female.    Chief Complaint: No chief complaint on file.    Problem List Items Addressed This Visit          Oncology    Endometrial cancer - Primary    Overview     7/28/23: EMB FIGO G1 endometrioid endometrial carcinoma  8/29/23: RA-TLH BSO SLN. Path IB G1 endometrioid endometrial carcinoma (0/7 SLN, No LVSI, 73% MI, pMMR, ER/GA +)    Adjuvant therapy  VBT (21 Gy in 3 fractions 11/8-11/22/23) with Dr. Lucero                     HPI Reports a 2 day history of vaginal spotting and discharge.    Review of Systems   Constitutional:  Negative for chills, fatigue and fever.   Respiratory:  Negative for cough and shortness of breath.    Cardiovascular:  Negative for chest pain.   Gastrointestinal:  Negative for abdominal distention, abdominal pain, constipation and diarrhea.   Genitourinary:  Negative for dysuria, pelvic pain and vaginal bleeding.   Musculoskeletal:  Negative for back pain.   Psychiatric/Behavioral:  Negative for dysphoric mood. The patient is not nervous/anxious.      Past Medical History:   Diagnosis Date    Anxiety     Asthma     as a child    Endometrial cancer 8/15/2023    Mental disorder     Depression    HERVE (obstructive sleep apnea)       Past Surgical History:   Procedure Laterality Date    COLONOSCOPY N/A 10/25/2016    Procedure: COLONOSCOPY;  Surgeon: JANINA Dominguez MD;  Location: [address];  Service: Endoscopy;  Laterality: N/A;    LYMPH NODE BIOPSY N/A 8/29/2023    Procedure: BIOPSY, LYMPH NODE;  Surgeon: Berry Jackson MD;  Location: [facility];  Service: OB/GYN;  Laterality: N/A;    LYSIS OF ADHESIONS OF URETER Left 8/29/2023    Procedure: URETEROLYSIS, ROBOTIC;  Surgeon: Berry Jackson MD;  Location: [facility];  Service: OB/GYN;  Laterality: Left;    MAPPING, LYMPH NODE, SENTINEL N/A 8/29/2023    Procedure: MAPPING, LYMPH NODE, SENTINEL;  Surgeon: Berry Jackson  MD;  Location: [facility] OR;  Service: OB/GYN;  Laterality: N/A;    ROBOT-ASSISTED LAPAROSCOPIC ABDOMINAL HYSTERECTOMY USING DA JUAN XI N/A 8/29/2023    Procedure: XI ROBOTIC HYSTERECTOMY;  Surgeon: Berry Jackson MD;  Location: [facility] OR;  Service: OB/GYN;  Laterality: N/A;  3 hr case / MEDICARE COVERAGE MUST BE SURGERY ADMIT    ROBOT-ASSISTED LAPAROSCOPIC SALPINGO-OOPHORECTOMY USING DA JUAN XI Bilateral 8/29/2023    Procedure: XI ROBOTIC SALPINGO-OOPHORECTOMY;  Surgeon: Berry Jackson MD;  Location: [facility] OR;  Service: OB/GYN;  Laterality: Bilateral;    TONSILLECTOMY  1958    TUBAL LIGATION  1979    PP BTL      Family History   Problem Relation Age of Onset    Hypertension Mother     Multiple myeloma Father     Colon cancer Sister 61    Hypertrophic cardiomyopathy Son     Breast cancer Maternal Aunt 72    Breast cancer Paternal Aunt 58    Diabetes Paternal Grandmother     Heart disease Neg Hx     Stroke Neg Hx     Ovarian cancer Neg Hx     Melanoma Neg Hx       Social History     Socioeconomic History    Marital status:         Objective:      Vitals:    04/11/24 1007   BP: 129/62   Pulse: (!) 56        Physical Exam  Constitutional:       General: She is not in acute distress.  HENT:      Head: Normocephalic.   Eyes:      Extraocular Movements: Extraocular movements intact.      Conjunctiva/sclera: Conjunctivae normal.   Cardiovascular:      Rate and Rhythm: Normal rate.      Pulses: Normal pulses.   Pulmonary:      Effort: Pulmonary effort is normal. No respiratory distress.      Breath sounds: No wheezing.   Abdominal:      General: There is no distension.      Tenderness: There is no abdominal tenderness. There is no guarding or rebound.   Genitourinary:     Comments: External genitalia normal. Vagina with 1 cm lesion consistent with granulation tissue at right aspect of vaginal cuff, silver nitrate applied. Uterus, cervix, and adnexa surgically absent. A female staff member was present for the  exam.  Musculoskeletal:         General: No deformity.   Neurological:      Mental Status: She is alert and oriented to person, place, and time.   Psychiatric:         Mood and Affect: Mood normal.         Behavior: Behavior normal.         Thought Content: Thought content normal.         Lab Results   Component Value Date    WBC 6.45 05/09/2023    HGB 14.7 05/09/2023    HCT 45.1 05/09/2023    MCV 95 05/09/2023     05/09/2023        Assessment:       Endometrial cancer               Plan:       Endometrial carcinoma: Stage IB Grade 1-  s/p RA TLH BSO SLN on 8/29/23. Completed VBT 11/22/23. 2 day history of vaginal spotting and discharge exam consistent with 1 cm area of granulation tissue at previous biopsy site (negative for recurrent carcinoma). Silver nitrate applied. Will continue to monitor symptoms and exam and consider repeat biopsy or CT scan if lesion enlarges. Anticipate continued healing and resolution. Low suspicion of recurrent disease at this time.  RONC in 3 months    2. Obesity: recommend diet and exercise to achieve and maintain a healthy weight. She has started a new exercise program.        Visit today is associated with current or anticipated ongoing medical care related to this patient's single serious condition/complex condition: endometrial carcinoma.           Berry Jackson MD

## 2024-04-15 ENCOUNTER — PATIENT MESSAGE (OUTPATIENT)
Dept: FAMILY MEDICINE | Facility: CLINIC | Age: 74
End: 2024-04-15
Payer: MEDICARE

## 2024-04-15 DIAGNOSIS — H92.09 OTALGIA, UNSPECIFIED LATERALITY: Primary | ICD-10-CM

## 2024-05-13 ENCOUNTER — CLINICAL SUPPORT (OUTPATIENT)
Dept: AUDIOLOGY | Facility: CLINIC | Age: 74
End: 2024-05-13
Payer: MEDICARE

## 2024-05-13 ENCOUNTER — OFFICE VISIT (OUTPATIENT)
Dept: OTOLARYNGOLOGY | Facility: CLINIC | Age: 74
End: 2024-05-13
Payer: MEDICARE

## 2024-05-13 VITALS
SYSTOLIC BLOOD PRESSURE: 102 MMHG | DIASTOLIC BLOOD PRESSURE: 76 MMHG | HEIGHT: 66 IN | WEIGHT: 191.13 LBS | BODY MASS INDEX: 30.72 KG/M2

## 2024-05-13 DIAGNOSIS — J30.2 SEASONAL ALLERGIC RHINITIS, UNSPECIFIED TRIGGER: ICD-10-CM

## 2024-05-13 DIAGNOSIS — H61.21 IMPACTED CERUMEN OF RIGHT EAR: Primary | ICD-10-CM

## 2024-05-13 DIAGNOSIS — H69.93 EUSTACHIAN TUBE DYSFUNCTION, BILATERAL: ICD-10-CM

## 2024-05-13 DIAGNOSIS — H90.3 SENSORINEURAL HEARING LOSS (SNHL) OF BOTH EARS: ICD-10-CM

## 2024-05-13 DIAGNOSIS — H92.09 OTALGIA, UNSPECIFIED LATERALITY: ICD-10-CM

## 2024-05-13 DIAGNOSIS — H90.3 ASYMMETRICAL SENSORINEURAL HEARING LOSS: ICD-10-CM

## 2024-05-13 DIAGNOSIS — H90.6 MIXED CONDUCTIVE AND SENSORINEURAL HEARING LOSS OF BOTH EARS: Primary | ICD-10-CM

## 2024-05-13 PROCEDURE — 1101F PT FALLS ASSESS-DOCD LE1/YR: CPT | Mod: CPTII,S$GLB,, | Performed by: NURSE PRACTITIONER

## 2024-05-13 PROCEDURE — 3078F DIAST BP <80 MM HG: CPT | Mod: CPTII,S$GLB,, | Performed by: NURSE PRACTITIONER

## 2024-05-13 PROCEDURE — 3288F FALL RISK ASSESSMENT DOCD: CPT | Mod: CPTII,S$GLB,, | Performed by: NURSE PRACTITIONER

## 2024-05-13 PROCEDURE — 92557 COMPREHENSIVE HEARING TEST: CPT | Mod: S$GLB,,,

## 2024-05-13 PROCEDURE — 3008F BODY MASS INDEX DOCD: CPT | Mod: CPTII,S$GLB,, | Performed by: NURSE PRACTITIONER

## 2024-05-13 PROCEDURE — 69210 REMOVE IMPACTED EAR WAX UNI: CPT | Mod: S$GLB,,, | Performed by: NURSE PRACTITIONER

## 2024-05-13 PROCEDURE — 99214 OFFICE O/P EST MOD 30 MIN: CPT | Mod: 25,S$GLB,, | Performed by: NURSE PRACTITIONER

## 2024-05-13 PROCEDURE — 1126F AMNT PAIN NOTED NONE PRSNT: CPT | Mod: CPTII,S$GLB,, | Performed by: NURSE PRACTITIONER

## 2024-05-13 PROCEDURE — 1159F MED LIST DOCD IN RCRD: CPT | Mod: CPTII,S$GLB,, | Performed by: NURSE PRACTITIONER

## 2024-05-13 PROCEDURE — 92567 TYMPANOMETRY: CPT | Mod: S$GLB,,,

## 2024-05-13 PROCEDURE — 3074F SYST BP LT 130 MM HG: CPT | Mod: CPTII,S$GLB,, | Performed by: NURSE PRACTITIONER

## 2024-05-13 RX ORDER — FLUTICASONE PROPIONATE 50 MCG
1 SPRAY, SUSPENSION (ML) NASAL 2 TIMES DAILY
Qty: 18.2 ML | Refills: 3 | Status: SHIPPED | OUTPATIENT
Start: 2024-05-13

## 2024-05-13 RX ORDER — AZELASTINE 1 MG/ML
1 SPRAY, METERED NASAL 2 TIMES DAILY
Qty: 30 ML | Refills: 3 | Status: SHIPPED | OUTPATIENT
Start: 2024-05-13

## 2024-05-13 NOTE — PROGRESS NOTES
Please click on link to view Audiogram:  Document on 5/13/2024 10:01 AM by Deepika Cool AU.D: Audiogram    Ms. Lenore Subramanian, a 74 y.o. female, was seen in the clinic today for an audiological evaluation.     Impacted cerumen noted in the right ear upon otoscopic inspection. Otoscopy clear for the left ear. Continued audiological testing after patient was seen for ear cleaning.     Tympanometry testing revealed a Type Ad tympanogram for the right ear and a Type A tympanogram for the left ear.     Audiological testing revealed a moderate to severe mixed hearing loss (MHL) for the right ear and a mild to severe MHL for the left ear. A speech reception threshold was obtained at 55 dBHL for the right ear and at 35 dBHL for the left ear. Speech discrimination was 100% for the right ear and 96% for the left ear.      Recommendations:  1. Otologic evaluation  2. Repeat audiological evaluation in conjunction with medical treatment  3. Hearing protection when in noise

## 2024-05-13 NOTE — PATIENT INSTRUCTIONS
"Information and instructions from your visit with me today:    Start using the following medication nasal sprays:   Fluticasone spray:    This medication is a steroid spray. It stays within the nose and does not have absorption into the body that leads to side effects that one has with oral steroid medication. Fluticasone nasal spray is the same as the Flonase brand nasal spray. Discuss with your pharmacist if the price is lower over the counter or with a prescription ( this varies depending on insurance). The medication that is over the counter is the same as the prescription medication. Use this medication as instructed on the prescription, 1-2 sprays on each side of your nose twice daily.     Azelastine  spray:  This medication is an antihistamine used to treat nasal symptoms of allergy, which works specifically in the nose unlike antihistamine pills which have more of an effect on the whole body. Use this medication as instructed on the prescription, 1 spray on each side of your nose twice daily.     Additional instructions for medication sprays  Place the tip of the medication bottle in your nose and aim slightly up and out on each side to get medication high and deep into your nose and sinuses, and not have it all deposit in the very front of your nose. Aim the tip of the nozzle towards the outer corner of your eye . You can imagine aiming towards the back of your eyeball on each side for this, as opposed to straight back to the center of your nose and head.     You need to use this medication every day regardless of symptoms, as it takes time ( a few weeks) to work and get the benefits. It does not work on an "as needed" basis like taking a decongestant. If your symptoms only occur in a particular season, then the medication can be used seasonally instead of year long. For seasonal symptoms, you should start using the spray twice daily a month before when you normally have symptoms ( for example, if symptoms " start in August, should start at the end of June).       NASAL SALINE SPRAY ( simply saline and arm and hammer are examples) There are several different brands found in the cold and flu aisle of the pharmacy. You can use any brand of saline spray - this will deliver the saline by a gentle mist ( if you have difficulty or discomfort with nasal rinse/ a lot of fluid in the nose, this will be more comfortable).       Always rinse your nose with saline prior to using medication sprays and wait a couple of hours before using again. You can use the saline throughout the day to help with stuffy nose or dry nose.    Do not use nasal decongestant sprays such as Afrin or similar products long term ( over 3 days) .  This can cause long term physical nasal addiction. Afrin should only be used if having nose bleeds, severe nasal congestion , or severe ear pain/fullness and should not be used for more than 2-3 days in a row . It is a not a medication that should be used for a long period of time.     It was nice meeting you today, and I look forward to helping you feel better soon. Please don't hesitate to call if you have any other questions or concerns, or if I can be of any assistance in the meantime.          JOSE Crowder, FNP-C  Otorhinolaryngology

## 2024-05-13 NOTE — PROGRESS NOTES
OTOLARYNGOLOGY CLINIC NOTE  Date:  05/13/2024     Chief complaint:  Chief Complaint   Patient presents with    Cerumen Impaction    Hearing Loss     History of Present Illness  Lenore Subramanian is a 74 y.o. female  presenting today for a new evaluation and treatment of hearing loss.  Pt reports she hears better out of her left ear.  Pt reports her right ear always holds the wax.  Pt reports using debrox but it doesn't come out.  Pt denies any otalgia, otorrhea, tinnitus or vertigo.  Pt reports having allergy symptoms including nasal congestion, post nasal drip and rhinitis.  Pt reports she has been taking oral antihistamines.  Pt reports also having sleep apnea in which she has been using CPAP machine.  Pt reports getting a new tubing symptoms and has been able to tolerate it.      Review of medical records and prior documentation  Past medical records were reviewed with data pertinent to the chief complaint summarized in the HPI. Information obtained from review of medical records is attributed to respective sources in the HPI with reference to sources of information at their mention. Records reviewed included all recent notes from referring provider, primary care, and related subspecialty evaluations as available. This review of records was performed and additional data obtained to supplement history obtained from the patient and further inform medical decision making involved in formulating a plan of care accounting for all history and treatment relevant to the issues addressed.    Past Medical History  Past Medical History:   Diagnosis Date    Anxiety     Asthma     as a child    Endometrial cancer 8/15/2023    Mental disorder     Depression    HERVE (obstructive sleep apnea)       Past Surgical History  Past Surgical History:   Procedure Laterality Date    COLONOSCOPY N/A 10/25/2016    Procedure: COLONOSCOPY;  Surgeon: JANINA Dominguez MD;  Location: [address];  Service: Endoscopy;  Laterality: N/A;     LYMPH NODE BIOPSY N/A 8/29/2023    Procedure: BIOPSY, LYMPH NODE;  Surgeon: Berry Jackson MD;  Location: [facility] OR;  Service: OB/GYN;  Laterality: N/A;    LYSIS OF ADHESIONS OF URETER Left 8/29/2023    Procedure: URETEROLYSIS, ROBOTIC;  Surgeon: Berry Jackson MD;  Location: [facility] OR;  Service: OB/GYN;  Laterality: Left;    MAPPING, LYMPH NODE, SENTINEL N/A 8/29/2023    Procedure: MAPPING, LYMPH NODE, SENTINEL;  Surgeon: Berry Jackson MD;  Location: [facility] OR;  Service: OB/GYN;  Laterality: N/A;    ROBOT-ASSISTED LAPAROSCOPIC ABDOMINAL HYSTERECTOMY USING DA JUAN XI N/A 8/29/2023    Procedure: XI ROBOTIC HYSTERECTOMY;  Surgeon: Berry Jackson MD;  Location: [facility] OR;  Service: OB/GYN;  Laterality: N/A;  3 hr case / MEDICARE COVERAGE MUST BE SURGERY ADMIT    ROBOT-ASSISTED LAPAROSCOPIC SALPINGO-OOPHORECTOMY USING DA JUAN XI Bilateral 8/29/2023    Procedure: XI ROBOTIC SALPINGO-OOPHORECTOMY;  Surgeon: Berry Jackson MD;  Location: [facility];  Service: OB/GYN;  Laterality: Bilateral;    TONSILLECTOMY  1958    TUBAL LIGATION  1979    PP BTL      Medications  Current Outpatient Medications on File Prior to Visit   Medication Sig Dispense Refill    acetaminophen (TYLENOL) 650 MG TbSR Take 1 tablet (650 mg total) by mouth every 6 (six) hours as needed. 30 tablet 3    albuterol (PROVENTIL/VENTOLIN HFA) 90 mcg/actuation inhaler Inhale 2 puffs into the lungs every 6 (six) hours as needed for Wheezing. Rescue 18 g 0    aspirin (ECOTRIN) 81 MG EC tablet Take 81 mg by mouth once daily.      calcium carbonate/vitamin D3 (CALCIUM WITH VITAMIN D3 ORAL) Take by mouth.      cetirizine (ZYRTEC) 10 MG tablet TAKE 1 TABLET(10 MG) BY MOUTH EVERY DAY 90 tablet 0    EScitalopram oxalate (LEXAPRO) 20 MG tablet Take 1 tablet (20 mg total) by mouth once daily. 90 tablet 3    FLAXSEED OIL ORAL Take by mouth.      glucosamine-chondroitin 500-400 mg tablet Take 1 tablet by mouth 3 (three) times daily.      omega-3  "fatty acids/fish oil (FISH OIL-OMEGA-3 FATTY ACIDS) 300-1,000 mg capsule Take by mouth once daily.      valACYclovir (VALTREX) 1000 MG tablet Take 1 tablet (1,000 mg total) by mouth daily as needed. 30 tablet 6    vit A/C/E ac/ZnOx/cupric oxide (EYE VITAMIN AND MINERALS ORAL) Take by mouth.      [DISCONTINUED] fluticasone propionate (FLONASE) 50 mcg/actuation nasal spray USE 1 TO 2 SPRAYS IN EACH NOSTRIL ONCE DAILY 48 g 5     No current facility-administered medications on file prior to visit.     Review of Systems  Review of Systems   Constitutional: Negative.    HENT:  Positive for ear pain and hearing loss.    Eyes: Negative.    Respiratory: Negative.     Cardiovascular: Negative.    Gastrointestinal: Negative.    Skin: Negative.       Social History   reports that she has never smoked. She has never been exposed to tobacco smoke. She has never used smokeless tobacco. She reports current alcohol use of about 7.0 standard drinks of alcohol per week. She reports that she does not use drugs.     Family History  Family History   Problem Relation Name Age of Onset    Hypertension Mother      Multiple myeloma Father      Colon cancer Sister x 1 61    Hypertrophic cardiomyopathy Son      Breast cancer Maternal Aunt  72    Breast cancer Paternal Aunt  58    Diabetes Paternal Grandmother      Heart disease Neg Hx      Stroke Neg Hx      Ovarian cancer Neg Hx      Melanoma Neg Hx        Physical Exam   Vitals:    05/13/24 0939   BP: 102/76    Body mass index is 30.85 kg/m².  Weight: 86.7 kg (191 lb 2.2 oz)   Height: 5' 6" (167.6 cm)     GENERAL: no acute distress.  HEAD: normocephalic.   EYES: lids and lashes normal. No scleral icterus  EARS: external ear without lesion, normal pinna shape and position.  External auditory canal with normal cerumen, tympanic membrane fully visible, no perforation , no retraction. No middle ear effusion. Ossicles intact.   NOSE: external nose without significant bony abnormality  ORAL " CAVITY/OROPHARYNX: tongue midline and mobile. Symmetric palate rise. Uvula midline.   NECK: trachea midline.   LYMPH NODES:No cervical lymphadenopathy.  RESPIRATORY: no stridor, no stertor. Voice normal. Respirations nonlabored.  NEURO: alert, responds to questions appropriately.   Cranial nerve exam as indicated in above sections and additionally showed facial movement symmetric with good eye closure and symmetric smile.   PSYCH:mood appropriate    Procedure Note   Procedure performed: microscopic examination of ears with cerumen disimpaction    Indication for procedure: unilateral cerumen impaction     Description of procedure:  After verbal consent was obtained, the patient was positioned in semi recumbent position and speculum was placed in the right / left  ear and microscope brought into the field.  The microscope was positioned and magnification adjusted for appropriate visualization. Otologic instruments including various size otologic suctions and curette were used to remove the cerumen from the right external auditory canals under visualization with the operating microscope. After cleaning, visualization was again performed and at various levels of higher magnification to optimize views of the ear canal, tympanic membrane, ossicles and middle ear space. Findings as indicated below. All portions of the procedure and examination by otomicroscopy were tolerated well without complication.     Findings:  Right complete cerumen impaction removed entirely revealing normal external auditory canal; tympanic membrane without bulging, retraction, or perforation; no evidence of middle ear fluid or effusion.     Imaging:  The patient does not have any pertinent and/or recent imaging of the head and neck.     Labs:  CBC  Recent Labs   Lab 12/09/21  0911 11/30/22  1018 05/09/23  0728   WBC 8.10 6.23 6.45   Hemoglobin 15.2 15.0 14.7   Hematocrit 47.5 46.3 45.1   MCV 98 98 95   Platelets 217 223 223     BMP  Recent Labs    Lab 12/09/21  0911 11/30/22  1018 05/09/23  0728   Glucose 88 78 89   Sodium 143 139 142   Potassium 5.1 4.2 4.7   Chloride 107 102 105   CO2 25 25 27   BUN 9 8 11   Creatinine 0.8 0.8 0.8   Calcium 9.8 9.1 9.6     Assessment  1. Asymmetrical sensorineural hearing loss  - Creatinine, Serum; Future    2. Seasonal allergic rhinitis, unspecified trigger  - fluticasone propionate (FLONASE) 50 mcg/actuation nasal spray; 1 spray (50 mcg total) by Each Nostril route 2 (two) times daily.  Dispense: 18.2 mL; Refill: 3  - azelastine (ASTELIN) 137 mcg (0.1 %) nasal spray; 1 spray (137 mcg total) by Nasal route 2 (two) times daily.  Dispense: 30 mL; Refill: 3    3. Eustachian tube dysfunction, bilateral    4. Otalgia, unspecified laterality  - Ambulatory referral/consult to ENT    5. Impacted cerumen of right ear    6. Sensorineural hearing loss (SNHL) of both ears  - MRI IAC/Temporal Bones W W/O Contrast; Future     Plan:  ASNHL:  Audiogram reviewed and discussed with patient. Pt advised she will need MRI to check for acoustic neuroma.  Encouraged hearing protection while in noisy environments.   Allergic rhinitis(AR):  discussed about pathophysiology of allergic disease and sinus disease. We discussed about treatment options for this including but not limited to medications and potential surgeries as well as when surgery is indicated.  - I recommend dual nasal spray therapy as combo of steroid and antihistamine nasal spray has been shown in evidence-based studies to be better than either alone.   -Discussed medication administration technique (point toward outer corner of eye and not towards nasal septum) and use nasal saline prior to medication sprays.   -We discussed importance of saline use prior to medication sprays to help sprays work better and to clean the nose.   -Counseled on risk of bleeding, risk of increased intraocular pressure/cataract with long term use.   -Discussed how to increase and decrease nasal spray  regimen based on symptoms and that sprays can be used on prn basis but work best when used daily and take about 2-3 weeks to get to max level. If patient using sprays on a prn basis and symptoms not controlled should go back to daily /bid use  -discussed as well as gave patient physical documentation with photos about the saline and other medication sprays (paperwork summarized discussion topics)  Eustachian tube dysfunction (ETD) : ETD can be idiopathic, due to anatomic obstruction,  or caused by  Inflammation from either allergic rhinitis (AR ) or laryngopharyngeal reflux (LPR).    Sometimes this can lead to development of a middle ear effusion (DONAL) which may or may not get secondarily infected. Usually, the fluid will resolve without intervention however in some cases it can take 8-12 weeks for fluid to resolve completely. Occasionally a tympanostomy tube (PET) needs to be placed for this ETD treatment in certain conditions (persistent DONAL >2-3 months or if severe pain associated with or without DONAL, significant retraction of tympanic membrane that appears to be starting to cause conductive hearing changes). No middle ear effusion today.   In addition to treatment trials for either AR or LPR, the patient can gently auto insufflate daily to intermittently equalize middle ear pressure. If unsuccessful, pt should not continue with more frequent or more forceful attempts with this maneuver. Intermittent use of Afrin can also help however I advise to only use if having severe pain given risk of rhinitis medicamentosa (pt counseled extensively about  risk of physical addiction and not to use for prolonged time period).    Discussed plan of care with patient in detail and all questions answered. Patient reported understanding of plan of care.

## 2024-05-29 ENCOUNTER — LAB VISIT (OUTPATIENT)
Dept: LAB | Facility: HOSPITAL | Age: 74
End: 2024-05-29
Attending: NURSE PRACTITIONER
Payer: MEDICARE

## 2024-05-29 DIAGNOSIS — H90.3 ASYMMETRICAL SENSORINEURAL HEARING LOSS: ICD-10-CM

## 2024-05-29 LAB
CREAT SERPL-MCNC: 0.8 MG/DL (ref 0.5–1.4)
EST. GFR  (NO RACE VARIABLE): >60 ML/MIN/1.73 M^2

## 2024-05-29 PROCEDURE — 82565 ASSAY OF CREATININE: CPT | Performed by: NURSE PRACTITIONER

## 2024-05-29 PROCEDURE — 36415 COLL VENOUS BLD VENIPUNCTURE: CPT | Mod: PO | Performed by: NURSE PRACTITIONER

## 2024-06-05 ENCOUNTER — HOSPITAL ENCOUNTER (OUTPATIENT)
Dept: RADIOLOGY | Facility: HOSPITAL | Age: 74
Discharge: HOME | End: 2024-06-05
Attending: NURSE PRACTITIONER
Payer: MEDICARE

## 2024-06-05 DIAGNOSIS — H90.3 SENSORINEURAL HEARING LOSS (SNHL) OF BOTH EARS: ICD-10-CM

## 2024-06-05 PROCEDURE — 70553 MRI BRAIN STEM W/O & W/DYE: CPT | Mod: 26,,, | Performed by: RADIOLOGY

## 2024-06-05 PROCEDURE — A9585 GADOBUTROL INJECTION: HCPCS | Performed by: NURSE PRACTITIONER

## 2024-06-05 PROCEDURE — 70553 MRI BRAIN STEM W/O & W/DYE: CPT | Mod: TC

## 2024-06-05 PROCEDURE — 25500020 PHARM REV CODE 255: Performed by: NURSE PRACTITIONER

## 2024-06-05 RX ORDER — GADOBUTROL 604.72 MG/ML
10 INJECTION INTRAVENOUS
Status: COMPLETED | OUTPATIENT
Start: 2024-06-05 | End: 2024-06-05

## 2024-06-05 RX ADMIN — GADOBUTROL 10 ML: 604.72 INJECTION INTRAVENOUS at 03:06

## 2024-07-10 NOTE — PROGRESS NOTES
Referring Provider:  Tho Nunez MD    Subjective:      Patient ID: Lenore Subramanian is a 74 y.o. female.    Chief Complaint: Follow-up (3 mth follow up )    Problem List Items Addressed This Visit          Oncology    Endometrial cancer - Primary    Overview     7/28/23: EMB FIGO G1 endometrioid endometrial carcinoma  8/29/23: RA-TLH BSO SLN. Path IB G1 endometrioid endometrial carcinoma (0/7 SLN, No LVSI, 73% MI, pMMR, ER/CA +)    Adjuvant therapy  VBT (21 Gy in 3 fractions 11/8-11/22/23) with Dr. Lucero                     HPI No further bleeding since last visit. Consistently using her dilator. Enjoying the new garden she planted this summer. Growing okra and tomatoes. Says she has been feeling very good.     Review of Systems   Constitutional:  Negative for chills, fatigue and fever.   Respiratory:  Negative for cough and shortness of breath.    Cardiovascular:  Negative for chest pain.   Gastrointestinal:  Negative for abdominal distention, abdominal pain, constipation and diarrhea.   Genitourinary:  Negative for dysuria, pelvic pain and vaginal bleeding.   Musculoskeletal:  Negative for back pain.   Psychiatric/Behavioral:  Negative for dysphoric mood. The patient is not nervous/anxious.      Past Medical History:   Diagnosis Date    Anxiety     Asthma     as a child    Endometrial cancer 8/15/2023    Mental disorder     Depression    HERVE (obstructive sleep apnea)       Past Surgical History:   Procedure Laterality Date    COLONOSCOPY N/A 10/25/2016    Procedure: COLONOSCOPY;  Surgeon: JANINA Dominguez MD;  Location: [address];  Service: Endoscopy;  Laterality: N/A;    LYMPH NODE BIOPSY N/A 8/29/2023    Procedure: BIOPSY, LYMPH NODE;  Surgeon: Berry Jackson MD;  Location: [facility];  Service: OB/GYN;  Laterality: N/A;    LYSIS OF ADHESIONS OF URETER Left 8/29/2023    Procedure: URETEROLYSIS, ROBOTIC;  Surgeon: Berry Jackson MD;  Location: [facility];  Service: OB/GYN;  Laterality:  Left;    MAPPING, LYMPH NODE, SENTINEL N/A 8/29/2023    Procedure: MAPPING, LYMPH NODE, SENTINEL;  Surgeon: Berry Jackson MD;  Location: [facility];  Service: OB/GYN;  Laterality: N/A;    ROBOT-ASSISTED LAPAROSCOPIC ABDOMINAL HYSTERECTOMY USING DA JUAN XI N/A 8/29/2023    Procedure: XI ROBOTIC HYSTERECTOMY;  Surgeon: Berry Jackson MD;  Location: [facility] OR;  Service: OB/GYN;  Laterality: N/A;  3 hr case / MEDICARE COVERAGE MUST BE SURGERY ADMIT    ROBOT-ASSISTED LAPAROSCOPIC SALPINGO-OOPHORECTOMY USING DA JUAN XI Bilateral 8/29/2023    Procedure: XI ROBOTIC SALPINGO-OOPHORECTOMY;  Surgeon: Berry Jackson MD;  Location: [facility] OR;  Service: OB/GYN;  Laterality: Bilateral;    TONSILLECTOMY  1958    TUBAL LIGATION  1979    PP BTL      Family History   Problem Relation Name Age of Onset    Hypertension Mother      Multiple myeloma Father      Colon cancer Sister x 1 61    Hypertrophic cardiomyopathy Son      Breast cancer Maternal Aunt  72    Breast cancer Paternal Aunt  58    Diabetes Paternal Grandmother      Heart disease Neg Hx      Stroke Neg Hx      Ovarian cancer Neg Hx      Melanoma Neg Hx        Social History     Socioeconomic History    Marital status:         Objective:      Vitals:    07/11/24 1051   BP: 129/73   Pulse: (!) 54          Physical Exam  Constitutional:       General: She is not in acute distress.  HENT:      Head: Normocephalic.   Eyes:      Extraocular Movements: Extraocular movements intact.      Conjunctiva/sclera: Conjunctivae normal.   Cardiovascular:      Rate and Rhythm: Normal rate.      Pulses: Normal pulses.   Pulmonary:      Effort: Pulmonary effort is normal. No respiratory distress.      Breath sounds: No wheezing.   Abdominal:      General: There is no distension.      Tenderness: There is no abdominal tenderness. There is no guarding or rebound.   Genitourinary:     Comments: External genitalia normal. Vagina without lesions. Vaginal cuff smooth. Uterus,  cervix, and adnexa surgically absent. No palpable masses. A female staff member was present for the exam.  Musculoskeletal:         General: No deformity.   Neurological:      Mental Status: She is alert and oriented to person, place, and time.   Psychiatric:         Mood and Affect: Mood normal.         Behavior: Behavior normal.         Thought Content: Thought content normal.         Lab Results   Component Value Date    WBC 6.45 05/09/2023    HGB 14.7 05/09/2023    HCT 45.1 05/09/2023    MCV 95 05/09/2023     05/09/2023        Assessment:       Endometrial cancer                 Plan:       Endometrial carcinoma: Stage IB Grade 1-  s/p RA TLH BSO SLN on 8/29/23. Completed VBT 11/22/23. No signs or symptoms of recurrent disease. Continue surveillance with visits every 6 months for the first 2 years and then annually until 5 years.   RONC in 6 months    2. Obesity: recommend diet and exercise to achieve and maintain a healthy weight. She has been active gardening.    As part of the medical decision making process I reviewed the ENT note and audiogram from 5/13 and Cr from 5/29.      Visit today is associated with current or anticipated ongoing medical care related to this patient's single serious condition/complex condition: endometrial carcinoma.           Berry Jackson MD

## 2024-07-11 ENCOUNTER — OFFICE VISIT (OUTPATIENT)
Dept: GYNECOLOGIC ONCOLOGY | Facility: CLINIC | Age: 74
End: 2024-07-11
Payer: MEDICARE

## 2024-07-11 VITALS
HEART RATE: 54 BPM | DIASTOLIC BLOOD PRESSURE: 73 MMHG | SYSTOLIC BLOOD PRESSURE: 129 MMHG | BODY MASS INDEX: 32.32 KG/M2 | WEIGHT: 194 LBS | HEIGHT: 65 IN

## 2024-07-11 DIAGNOSIS — E66.09 CLASS 1 OBESITY DUE TO EXCESS CALORIES WITH SERIOUS COMORBIDITY AND BODY MASS INDEX (BMI) OF 30.0 TO 30.9 IN ADULT: ICD-10-CM

## 2024-07-11 DIAGNOSIS — C54.1 ENDOMETRIAL CANCER: Primary | ICD-10-CM

## 2024-07-11 PROCEDURE — 99999 PR PBB SHADOW E&M-EST. PATIENT-LVL III: CPT | Mod: PBBFAC,HCNC,, | Performed by: STUDENT IN AN ORGANIZED HEALTH CARE EDUCATION/TRAINING PROGRAM

## 2024-07-19 ENCOUNTER — OFFICE VISIT (OUTPATIENT)
Dept: PULMONOLOGY | Facility: CLINIC | Age: 74
End: 2024-07-19
Payer: MEDICARE

## 2024-07-19 VITALS
SYSTOLIC BLOOD PRESSURE: 124 MMHG | DIASTOLIC BLOOD PRESSURE: 74 MMHG | BODY MASS INDEX: 32.27 KG/M2 | HEART RATE: 55 BPM | HEIGHT: 65 IN | OXYGEN SATURATION: 96 % | WEIGHT: 193.69 LBS

## 2024-07-19 DIAGNOSIS — G47.33 OSA (OBSTRUCTIVE SLEEP APNEA): Primary | ICD-10-CM

## 2024-07-19 PROCEDURE — 1101F PT FALLS ASSESS-DOCD LE1/YR: CPT | Mod: HCNC,CPTII,S$GLB, | Performed by: INTERNAL MEDICINE

## 2024-07-19 PROCEDURE — 3078F DIAST BP <80 MM HG: CPT | Mod: HCNC,CPTII,S$GLB, | Performed by: INTERNAL MEDICINE

## 2024-07-19 PROCEDURE — 3288F FALL RISK ASSESSMENT DOCD: CPT | Mod: HCNC,CPTII,S$GLB, | Performed by: INTERNAL MEDICINE

## 2024-07-19 PROCEDURE — 99213 OFFICE O/P EST LOW 20 MIN: CPT | Mod: HCNC,S$GLB,, | Performed by: INTERNAL MEDICINE

## 2024-07-19 PROCEDURE — 1126F AMNT PAIN NOTED NONE PRSNT: CPT | Mod: HCNC,CPTII,S$GLB, | Performed by: INTERNAL MEDICINE

## 2024-07-19 PROCEDURE — 1159F MED LIST DOCD IN RCRD: CPT | Mod: HCNC,CPTII,S$GLB, | Performed by: INTERNAL MEDICINE

## 2024-07-19 PROCEDURE — 99999 PR PBB SHADOW E&M-EST. PATIENT-LVL III: CPT | Mod: PBBFAC,HCNC,, | Performed by: INTERNAL MEDICINE

## 2024-07-19 PROCEDURE — 3008F BODY MASS INDEX DOCD: CPT | Mod: HCNC,CPTII,S$GLB, | Performed by: INTERNAL MEDICINE

## 2024-07-19 PROCEDURE — 3074F SYST BP LT 130 MM HG: CPT | Mod: HCNC,CPTII,S$GLB, | Performed by: INTERNAL MEDICINE

## 2024-07-19 NOTE — PROGRESS NOTES
Lenore Subramanian  was seen as follow up.    CHIEF COMPLAINT:    Chief Complaint   Patient presents with    Apnea       HISTORY OF PRESENT ILLNESS: Lenore Subramanian is a 74 y.o. female is here for sleep evaluation.   Patient was diagnosed with chadd in 2008 at [facility].  Patient was seen by DAVIE Bolden and last appointment was 4/24/17.  Patient was provided new cpap unit 4/17.  Our first encounter was 8/18/17.  At that time, patient was doing well with cpap of 8 cm H20 with nasal pillow. No issue with cpap.  With cpap, patient denied apnea, snoring.  Feeling rested upon awake.      Patient was set up with apap 3/2024.  Like new resmed with Airfit n30.      [place] Sleepiness Scale score during initial sleep evaluation was 11.    SLEEP ROUTINE:  Activity the hour prior to sleep:   Hygiene and read    Bed partner:    Time to bed:  9-10 pm   Lights off:  yes  Sleep onset latency:  5-10 minutes        Disruptions or awakenings:    0-1 times    Wakeup time:      7 am   Perceived sleep quality:  rested       Daytime naps:      60 minutes in afternoon on some day  Weekend sleep routine:      same  Caffeine use: 2 cups of coffee in am   exercise habit:  active in garden    PAST MEDICAL HISTORY:    Active Ambulatory Problems     Diagnosis Date Noted    Depression with anxiety     Chronic rhinitis 07/31/2015    Oral herpes simplex, not currently active 07/31/2015    Primary osteoarthritis involving multiple joints 07/31/2015    Class 1 obesity due to excess calories with serious comorbidity in adult 07/31/2015    Family history of diabetes mellitus 07/31/2015    CHADD (obstructive sleep apnea) 05/03/2017    Endometrial cancer 08/15/2023    S/P RA-TLH/BSO/SLNB 08/29/2023     Resolved Ambulatory Problems     Diagnosis Date Noted    Microhematuria 07/31/2015    Screening 10/25/2016     Past Medical History:   Diagnosis Date    Anxiety     Asthma     Mental disorder                 PAST SURGICAL HISTORY:    Past Surgical History:    Procedure Laterality Date    COLONOSCOPY N/A 10/25/2016    Procedure: COLONOSCOPY;  Surgeon: JANINA Dominguez MD;  Location: [address]);  Service: Endoscopy;  Laterality: N/A;    LYMPH NODE BIOPSY N/A 8/29/2023    Procedure: BIOPSY, LYMPH NODE;  Surgeon: Berry Jackson MD;  Location: [facility];  Service: OB/GYN;  Laterality: N/A;    LYSIS OF ADHESIONS OF URETER Left 8/29/2023    Procedure: URETEROLYSIS, ROBOTIC;  Surgeon: Berry Jackson MD;  Location: [facility];  Service: OB/GYN;  Laterality: Left;    MAPPING, LYMPH NODE, SENTINEL N/A 8/29/2023    Procedure: MAPPING, LYMPH NODE, SENTINEL;  Surgeon: Berry Jackson MD;  Location: [facility];  Service: OB/GYN;  Laterality: N/A;    ROBOT-ASSISTED LAPAROSCOPIC ABDOMINAL HYSTERECTOMY USING DA JUAN XI N/A 8/29/2023    Procedure: XI ROBOTIC HYSTERECTOMY;  Surgeon: Berry Jackson MD;  Location: [facility];  Service: OB/GYN;  Laterality: N/A;  3 hr case / MEDICARE COVERAGE MUST BE SURGERY ADMIT    ROBOT-ASSISTED LAPAROSCOPIC SALPINGO-OOPHORECTOMY USING DA JUAN XI Bilateral 8/29/2023    Procedure: XI ROBOTIC SALPINGO-OOPHORECTOMY;  Surgeon: Berry Jackson MD;  Location: [facility];  Service: OB/GYN;  Laterality: Bilateral;    TONSILLECTOMY  1958    TUBAL LIGATION  1979    PP BTL         FAMILY HISTORY:                Family History   Problem Relation Name Age of Onset    Hypertension Mother      Multiple myeloma Father      Colon cancer Sister x 1 61    Hypertrophic cardiomyopathy Son      Breast cancer Maternal Aunt  72    Breast cancer Paternal Aunt  58    Diabetes Paternal Grandmother      Heart disease Neg Hx      Stroke Neg Hx      Ovarian cancer Neg Hx      Melanoma Neg Hx         SOCIAL HISTORY:          Tobacco:   Social History     Tobacco Use   Smoking Status Never    Passive exposure: Never   Smokeless Tobacco Never       alcohol use:    Social History     Substance and Sexual Activity   Alcohol Use Yes    Alcohol/week: 7.0  standard drinks of alcohol    Types: 7 Glasses of wine per week    Comment: occasionally                 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    ALLERGIES:    Review of patient's allergies indicates:   Allergen Reactions    Poison ivy [vit e-nonoxynol 9-aloe vera] Rash       CURRENT MEDICATIONS:    Current Outpatient Medications   Medication Sig Dispense Refill    acetaminophen (TYLENOL) 650 MG TbSR Take 1 tablet (650 mg total) by mouth every 6 (six) hours as needed. 30 tablet 3    albuterol (PROVENTIL/VENTOLIN HFA) 90 mcg/actuation inhaler Inhale 2 puffs into the lungs every 6 (six) hours as needed for Wheezing. Rescue 18 g 0    aspirin (ECOTRIN) 81 MG EC tablet Take 81 mg by mouth once daily.      azelastine (ASTELIN) 137 mcg (0.1 %) nasal spray 1 spray (137 mcg total) by Nasal route 2 (two) times daily. 30 mL 3    calcium carbonate/vitamin D3 (CALCIUM WITH VITAMIN D3 ORAL) Take by mouth.      cetirizine (ZYRTEC) 10 MG tablet TAKE 1 TABLET(10 MG) BY MOUTH EVERY DAY 90 tablet 0    EScitalopram oxalate (LEXAPRO) 20 MG tablet Take 1 tablet (20 mg total) by mouth once daily. 90 tablet 3    FLAXSEED OIL ORAL Take by mouth.      fluticasone propionate (FLONASE) 50 mcg/actuation nasal spray 1 spray (50 mcg total) by Each Nostril route 2 (two) times daily. 18.2 mL 3    glucosamine-chondroitin 500-400 mg tablet Take 1 tablet by mouth 3 (three) times daily.      omega-3 fatty acids/fish oil (FISH OIL-OMEGA-3 FATTY ACIDS) 300-1,000 mg capsule Take by mouth once daily.      valACYclovir (VALTREX) 1000 MG tablet Take 1 tablet (1,000 mg total) by mouth daily as needed. 30 tablet 6    vit A/C/E ac/ZnOx/cupric oxide (EYE VITAMIN AND MINERALS ORAL) Take by mouth.       No current facility-administered medications for this visit.                  REVIEW OF SYSTEMS:     Sleep related symptoms as per HPI.  CONST:Denies weight gain    HEENT: Denies sinus congestion  PULM: Denies dyspnea  CARD:   "Denies palpitations   GI:  Denies acid reflux  : Denies polyuria  NEURO: Denies headaches  PSYCH: Denies mood disturbance  HEME: Denies anemia   Otherwise, a balance of systems reviewed is negative.          PHYSICAL EXAM:  Vitals:    07/19/24 1126   BP: 124/74   Pulse: (!) 55   SpO2: 96%   Weight: 87.9 kg (193 lb 10.8 oz)   Height: 5' 5" (1.651 m)   PainSc: 0-No pain       Body mass index is 32.23 kg/m².     GENERAL: Normal development, well groomed  HEENT:  Conjunctivae are non-erythematous; Pupils equal, round, and reactive to light; Nose is symmetrical; Nasal mucosa is pink and moist; Septum is midline; Inferior turbinates are normal; Nasal airflow is normal; Posterior pharynx is pink; Modified Mallampati: 3; Posterior palate is normal; Tonsils +1; Uvula is normal and pink;Tongue is normal; Dentition is fair; No TMJ tenderness; Jaw opening and protrusion without click and without discomfort.  NECK: Supple. Neck circumference is 15 inches. No [id number]. No palpable nodes.     SKIN: On face and neck: No abrasions, no rashes, no lesions.  No subcutaneous nodules are palpable.  RESPIRATORY: Chest is clear to auscultation.  Normal chest expansion and non-labored breathing at rest.  CARDIOVASCULAR: Normal S1, S2.  No murmurs, gallops or rubs. No carotid bruits bilaterally.  EXTREMITIES: No edema. No clubbing. No cyanosis. Station normal. Gait normal.        NEURO/PSYCH: Oriented to time, place and person. Normal attention span and concentration. Affect is full. Mood is normal.                                              DATA   PSG AHI 14.8/low sat 86%  Titration study 5/27/08---effective cpap 8cm    Lab Results   Component Value Date    TSH 3.343 05/09/2023     ASSESSMENT    Problem List Items Addressed This Visit       HERVE (obstructive sleep apnea) - Primary    Overview     Ahi of 15  Currently with ResMed APAP 5-12  100%>4 hours.  Residual ahi of 6.2.  p95 11.8.  will increase apap to 5-14.  Patient is " benefiting from apap         Relevant Orders    CPAP/BIPAP SUPPLIES            Education: During our discussion today, we talked about the etiology of obstructive sleep apnea as well as the potential ramifications of untreated sleep apnea, which could include daytime sleepiness, hypertension, heart disease and/or stroke.     Precautions: The patient was advised to abstain from driving should they feel sleepy or drowsy.         Patient will No follow-ups on file. with md/np.    Please cc note to  No ref. provider found.    15 minutes of total time spent on the encounter, which includes face to face time and non-face to face time preparing to see the patient (eg, review of tests), Obtaining and/or reviewing separately obtained history, documenting clinical information in the electronic or other health record, independently interpreting results (not separately reported) and communicating results to the patient/family/caregiver, or Care coordination (not separately reported).

## 2024-08-13 ENCOUNTER — CLINICAL SUPPORT (OUTPATIENT)
Dept: AUDIOLOGY | Facility: CLINIC | Age: 74
End: 2024-08-13
Payer: MEDICARE

## 2024-08-13 ENCOUNTER — OFFICE VISIT (OUTPATIENT)
Dept: OTOLARYNGOLOGY | Facility: CLINIC | Age: 74
End: 2024-08-13
Payer: MEDICARE

## 2024-08-13 VITALS
DIASTOLIC BLOOD PRESSURE: 62 MMHG | SYSTOLIC BLOOD PRESSURE: 120 MMHG | BODY MASS INDEX: 32.29 KG/M2 | HEIGHT: 65 IN | WEIGHT: 193.81 LBS

## 2024-08-13 DIAGNOSIS — H90.6 MIXED CONDUCTIVE AND SENSORINEURAL HEARING LOSS OF BOTH EARS: Primary | ICD-10-CM

## 2024-08-13 DIAGNOSIS — G47.33 OSA (OBSTRUCTIVE SLEEP APNEA): ICD-10-CM

## 2024-08-13 DIAGNOSIS — H69.93 EUSTACHIAN TUBE DYSFUNCTION, BILATERAL: ICD-10-CM

## 2024-08-13 DIAGNOSIS — J30.2 SEASONAL ALLERGIC RHINITIS, UNSPECIFIED TRIGGER: Primary | ICD-10-CM

## 2024-08-13 PROCEDURE — 1159F MED LIST DOCD IN RCRD: CPT | Mod: CPTII,S$GLB,, | Performed by: NURSE PRACTITIONER

## 2024-08-13 PROCEDURE — 1101F PT FALLS ASSESS-DOCD LE1/YR: CPT | Mod: CPTII,S$GLB,, | Performed by: NURSE PRACTITIONER

## 2024-08-13 PROCEDURE — 1126F AMNT PAIN NOTED NONE PRSNT: CPT | Mod: CPTII,S$GLB,, | Performed by: NURSE PRACTITIONER

## 2024-08-13 PROCEDURE — 92557 COMPREHENSIVE HEARING TEST: CPT | Mod: S$GLB,,,

## 2024-08-13 PROCEDURE — 92567 TYMPANOMETRY: CPT | Mod: S$GLB,,,

## 2024-08-13 PROCEDURE — 99213 OFFICE O/P EST LOW 20 MIN: CPT | Mod: S$GLB,,, | Performed by: NURSE PRACTITIONER

## 2024-08-13 PROCEDURE — 3074F SYST BP LT 130 MM HG: CPT | Mod: CPTII,S$GLB,, | Performed by: NURSE PRACTITIONER

## 2024-08-13 PROCEDURE — 3288F FALL RISK ASSESSMENT DOCD: CPT | Mod: CPTII,S$GLB,, | Performed by: NURSE PRACTITIONER

## 2024-08-13 PROCEDURE — 3078F DIAST BP <80 MM HG: CPT | Mod: CPTII,S$GLB,, | Performed by: NURSE PRACTITIONER

## 2024-08-13 PROCEDURE — 3008F BODY MASS INDEX DOCD: CPT | Mod: CPTII,S$GLB,, | Performed by: NURSE PRACTITIONER

## 2024-08-13 NOTE — PROGRESS NOTES
Please click on link to view Audiogram:  Document on 8/13/2024 9:07 AM by Deepika Cool AU.D: Audiogram    Ms. Lenore Subramanian, a 74 y.o. female, was seen in the clinic today for a follow-up audiological evaluation. Previous audiogram from 5/13/24 indicated a moderate to severe mixed hearing loss (MHL) for the right ear and a mild to severe MHL for the left ear     Otoscopy clear bilaterally. Tympanometry testing revealed a Type A tympanogram for the right ear and a Type A tympanogram for the left ear.     Audiological testing revealed a moderate to severe MHL for the right ear and a mild to moderately-severe MHL for the left ear. A speech reception threshold was obtained at 60 dBHL for the right ear and at 40 dBHL for the left ear. Speech discrimination was 96% for the right ear and 96% for the left ear.      Recommendations:  1. Otologic evaluation  2. Annual audiological evaluation, sooner if medically necessary or if hearing changes  3. Hearing protection when in noise   4. Hearing aid consultation following medical clearance

## 2024-08-13 NOTE — PROGRESS NOTES
OTOLARYNGOLOGY CLINIC NOTE  Date:  08/13/2024     Chief complaint:  Chief Complaint   Patient presents with    Sleep Apnea     questions    ETD     3 MONTH FOLLOW UP FOR ETD WITH      History of Present Illness  Lenore Subramanian is a 74 y.o. female  presenting today for ETD f/u.  Pt reports using the nasal sprays as ordered.  Pt reports she hasn't been feeling the fullness or pressure any more.  Pt reports her ears feel more open.  Pt denies any otalgia, otorrhea or vertigo. Pt reports wanting to know other options or treatment of her sleep apnea.  More specific she wants to know if she could have the same procedure her friend had where they removed her lingual tonsils.  Pt currently doesn't have tonsil because they were removed when she was 8 years old.  Pt reports she has gained 40 lbs which has made her sleep apnea worse.  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  Pt reports she is trying to get motivated to decrease her portions and start exercising.  Pt reports the nasal sprays have significantly improved her allergy symptoms.      Notes from previous visit on 05/13/2024:  Lenore Subramanian is a 74 y.o. female  presenting today for a new evaluation and treatment of hearing loss.  Pt reports she hears better out of her left ear.  Pt reports her right ear always holds the wax.  Pt reports using debrox but it doesn't come out.  Pt denies any otalgia, otorrhea, tinnitus or vertigo.  Pt reports having allergy symptoms including nasal congestion, post nasal drip and rhinitis.  Pt reports she has been taking oral antihistamines.  Pt reports also having sleep apnea in which she has been using CPAP machine.  Pt reports getting a new tubing symptoms and has been able to tolerate it.      Review of medical records and prior documentation  Past medical records were reviewed with data pertinent to the chief complaint summarized in the HPI. Information obtained from  review of medical records is attributed to respective sources in the HPI with reference to sources of information at their mention. Records reviewed included all recent notes from referring provider, primary care, and related subspecialty evaluations as available. This review of records was performed and additional data obtained to supplement history obtained from the patient and further inform medical decision making involved in formulating a plan of care accounting for all history and treatment relevant to the issues addressed.    Past Medical History  Past Medical History:   Diagnosis Date    Anxiety     Asthma     as a child    Endometrial cancer 8/15/2023    Mental disorder     Depression    HERVE (obstructive sleep apnea)       Past Surgical History  Past Surgical History:   Procedure Laterality Date    COLONOSCOPY N/A 10/25/2016    Procedure: COLONOSCOPY;  Surgeon: JANINA Dominguez MD;  Location: [address];  Service: Endoscopy;  Laterality: N/A;    LYMPH NODE BIOPSY N/A 8/29/2023    Procedure: BIOPSY, LYMPH NODE;  Surgeon: Berry Jackson MD;  Location: [facility];  Service: OB/GYN;  Laterality: N/A;    LYSIS OF ADHESIONS OF URETER Left 8/29/2023    Procedure: URETEROLYSIS, ROBOTIC;  Surgeon: Berry Jackson MD;  Location: [facility];  Service: OB/GYN;  Laterality: Left;    MAPPING, LYMPH NODE, SENTINEL N/A 8/29/2023    Procedure: MAPPING, LYMPH NODE, SENTINEL;  Surgeon: Berry Jackson MD;  Location: [facility];  Service: OB/GYN;  Laterality: N/A;    ROBOT-ASSISTED LAPAROSCOPIC ABDOMINAL HYSTERECTOMY USING DA JUAN XI N/A 8/29/2023    Procedure: XI ROBOTIC HYSTERECTOMY;  Surgeon: Berry Jackson MD;  Location: [facility];  Service: OB/GYN;  Laterality: N/A;  3 hr case / MEDICARE COVERAGE MUST BE SURGERY ADMIT    ROBOT-ASSISTED LAPAROSCOPIC SALPINGO-OOPHORECTOMY USING DA JUAN XI Bilateral 8/29/2023    Procedure: XI ROBOTIC SALPINGO-OOPHORECTOMY;  Surgeon: Berry Jackson MD;   Location: [facility] OR;  Service: OB/GYN;  Laterality: Bilateral;    TONSILLECTOMY  1958    TUBAL LIGATION  1979    PP BTL      Medications  Current Outpatient Medications on File Prior to Visit   Medication Sig Dispense Refill    acetaminophen (TYLENOL) 650 MG TbSR Take 1 tablet (650 mg total) by mouth every 6 (six) hours as needed. 30 tablet 3    albuterol (PROVENTIL/VENTOLIN HFA) 90 mcg/actuation inhaler Inhale 2 puffs into the lungs every 6 (six) hours as needed for Wheezing. Rescue 18 g 0    aspirin (ECOTRIN) 81 MG EC tablet Take 81 mg by mouth once daily.      azelastine (ASTELIN) 137 mcg (0.1 %) nasal spray 1 spray (137 mcg total) by Nasal route 2 (two) times daily. 30 mL 3    calcium carbonate/vitamin D3 (CALCIUM WITH VITAMIN D3 ORAL) Take by mouth.      cetirizine (ZYRTEC) 10 MG tablet TAKE 1 TABLET(10 MG) BY MOUTH EVERY DAY 90 tablet 0    EScitalopram oxalate (LEXAPRO) 20 MG tablet Take 1 tablet (20 mg total) by mouth once daily. 90 tablet 3    FLAXSEED OIL ORAL Take by mouth.      fluticasone propionate (FLONASE) 50 mcg/actuation nasal spray 1 spray (50 mcg total) by Each Nostril route 2 (two) times daily. 18.2 mL 3    glucosamine-chondroitin 500-400 mg tablet Take 1 tablet by mouth 3 (three) times daily.      omega-3 fatty acids/fish oil (FISH OIL-OMEGA-3 FATTY ACIDS) 300-1,000 mg capsule Take by mouth once daily.      valACYclovir (VALTREX) 1000 MG tablet Take 1 tablet (1,000 mg total) by mouth daily as needed. 30 tablet 6    vit A/C/E ac/ZnOx/cupric oxide (EYE VITAMIN AND MINERALS ORAL) Take by mouth.       No current facility-administered medications on file prior to visit.     Review of Systems  Review of Systems   Constitutional: Negative.    HENT: Negative.     Eyes: Negative.    Respiratory: Negative.     Cardiovascular: Negative.    Gastrointestinal: Negative.    Skin: Negative.       Social History   reports that she has never smoked. She has never been exposed to tobacco smoke. She has never used  "smokeless tobacco. She reports current alcohol use of about 7.0 standard drinks of alcohol per week. She reports that she does not use drugs.     Family History  Family History   Problem Relation Name Age of Onset    Hypertension Mother      Multiple myeloma Father      Colon cancer Sister x 1 61    Hypertrophic cardiomyopathy Son      Breast cancer Maternal Aunt  72    Breast cancer Paternal Aunt  58    Diabetes Paternal Grandmother      Heart disease Neg Hx      Stroke Neg Hx      Ovarian cancer Neg Hx      Melanoma Neg Hx        Physical Exam   Vitals:    08/13/24 0927   BP: 120/62    Body mass index is 32.25 kg/m².  Weight: 87.9 kg (193 lb 12.6 oz)   Height: 5' 5" (165.1 cm)     GENERAL: no acute distress.  HEAD: normocephalic.   EYES: lids and lashes normal. No scleral icterus  EARS: external ear without lesion, normal pinna shape and position.  External auditory canal with normal cerumen, tympanic membrane fully visible, no perforation , no retraction. No middle ear effusion. Ossicles intact.   NOSE: external nose without significant bony abnormality  ORAL CAVITY/OROPHARYNX: tongue midline and mobile. Symmetric palate rise. Uvula midline.   NECK: trachea midline.   LYMPH NODES:No cervical lymphadenopathy.  RESPIRATORY: no stridor, no stertor. Voice normal. Respirations nonlabored.  NEURO: alert, responds to questions appropriately.   Cranial nerve exam as indicated in above sections and additionally showed facial movement symmetric with good eye closure and symmetric smile.   PSYCH:mood appropriate    Imaging:  The patient does not have any pertinent and/or recent imaging of the head and neck.     Labs:  CBC  Recent Labs   Lab 12/09/21  0911 11/30/22  1018 05/09/23  0728   WBC 8.10 6.23 6.45   Hemoglobin 15.2 15.0 14.7   Hematocrit 47.5 46.3 45.1   MCV 98 98 95   Platelets 217 223 223     BMP  Recent Labs   Lab 12/09/21  0911 11/30/22  1018 05/09/23  0728 05/29/24  1437   Glucose 88 78 89  --    Sodium 143 139 " 142  --    Potassium 5.1 4.2 4.7  --    Chloride 107 102 105  --    CO2 25 25 27  --    BUN 9 8 11  --    Creatinine 0.8 0.8 0.8 0.8   Calcium 9.8 9.1 9.6  --      Assessment  1. Seasonal allergic rhinitis, unspecified trigger    2. Eustachian tube dysfunction, bilateral    3. HERVE (obstructive sleep apnea)     Plan:  Pt advised to continue nasal spray regimen.  MRI negative for any acoustic neuromas.  Pt advised to f/u in 1 year for hearing test and prn.  Pt advised to continue f/u with sleep medicine for treatment of her sleep apnea.  Discussed plan of care with patient in detail and all questions answered. Patient reported understanding of plan of care.

## 2024-09-25 ENCOUNTER — OFFICE VISIT (OUTPATIENT)
Facility: CLINIC | Age: 74
End: 2024-09-25
Payer: MEDICARE

## 2024-09-25 VITALS
RESPIRATION RATE: 18 BRPM | WEIGHT: 191 LBS | HEIGHT: 65 IN | OXYGEN SATURATION: 96 % | SYSTOLIC BLOOD PRESSURE: 102 MMHG | BODY MASS INDEX: 31.82 KG/M2 | HEART RATE: 50 BPM | DIASTOLIC BLOOD PRESSURE: 60 MMHG

## 2024-09-25 DIAGNOSIS — Z00.00 ENCOUNTER FOR MEDICARE ANNUAL WELLNESS EXAM: ICD-10-CM

## 2024-09-25 DIAGNOSIS — B00.2 ORAL HERPES SIMPLEX, NOT CURRENTLY ACTIVE: ICD-10-CM

## 2024-09-25 DIAGNOSIS — E66.09 CLASS 1 OBESITY DUE TO EXCESS CALORIES WITH SERIOUS COMORBIDITY AND BODY MASS INDEX (BMI) OF 31.0 TO 31.9 IN ADULT: ICD-10-CM

## 2024-09-25 DIAGNOSIS — G47.33 OSA (OBSTRUCTIVE SLEEP APNEA): ICD-10-CM

## 2024-09-25 DIAGNOSIS — J31.0 CHRONIC RHINITIS: ICD-10-CM

## 2024-09-25 DIAGNOSIS — F41.8 DEPRESSION WITH ANXIETY: ICD-10-CM

## 2024-09-25 DIAGNOSIS — Z83.3 FAMILY HISTORY OF DIABETES MELLITUS: ICD-10-CM

## 2024-09-25 DIAGNOSIS — Z71.89 ADVANCED DIRECTIVES, COUNSELING/DISCUSSION: ICD-10-CM

## 2024-09-25 DIAGNOSIS — Z00.00 ENCOUNTER FOR PREVENTIVE HEALTH EXAMINATION: ICD-10-CM

## 2024-09-25 DIAGNOSIS — C54.1 ENDOMETRIAL CANCER: ICD-10-CM

## 2024-09-25 DIAGNOSIS — Z87.09 HISTORY OF ASTHMA: ICD-10-CM

## 2024-09-25 DIAGNOSIS — M85.80 OSTEOPENIA, UNSPECIFIED LOCATION: ICD-10-CM

## 2024-09-25 DIAGNOSIS — M15.9 PRIMARY OSTEOARTHRITIS INVOLVING MULTIPLE JOINTS: ICD-10-CM

## 2024-09-25 PROCEDURE — 1160F RVW MEDS BY RX/DR IN RCRD: CPT | Mod: HCNC,CPTII,S$GLB, | Performed by: NURSE PRACTITIONER

## 2024-09-25 PROCEDURE — 99999 PR PBB SHADOW E&M-EST. PATIENT-LVL IV: CPT | Mod: PBBFAC,HCNC,, | Performed by: NURSE PRACTITIONER

## 2024-09-25 PROCEDURE — 1159F MED LIST DOCD IN RCRD: CPT | Mod: HCNC,CPTII,S$GLB, | Performed by: NURSE PRACTITIONER

## 2024-09-25 PROCEDURE — G0439 PPPS, SUBSEQ VISIT: HCPCS | Mod: HCNC,S$GLB,, | Performed by: NURSE PRACTITIONER

## 2024-09-25 PROCEDURE — 3078F DIAST BP <80 MM HG: CPT | Mod: HCNC,CPTII,S$GLB, | Performed by: NURSE PRACTITIONER

## 2024-09-25 PROCEDURE — 1126F AMNT PAIN NOTED NONE PRSNT: CPT | Mod: HCNC,CPTII,S$GLB, | Performed by: NURSE PRACTITIONER

## 2024-09-25 PROCEDURE — 1170F FXNL STATUS ASSESSED: CPT | Mod: HCNC,CPTII,S$GLB, | Performed by: NURSE PRACTITIONER

## 2024-09-25 PROCEDURE — 3288F FALL RISK ASSESSMENT DOCD: CPT | Mod: HCNC,CPTII,S$GLB, | Performed by: NURSE PRACTITIONER

## 2024-09-25 PROCEDURE — 3074F SYST BP LT 130 MM HG: CPT | Mod: HCNC,CPTII,S$GLB, | Performed by: NURSE PRACTITIONER

## 2024-09-25 PROCEDURE — 1101F PT FALLS ASSESS-DOCD LE1/YR: CPT | Mod: HCNC,CPTII,S$GLB, | Performed by: NURSE PRACTITIONER

## 2024-09-25 NOTE — PROGRESS NOTES
"  Lenore Subramanian presented for a follow-up Medicare AWV today. The following components were reviewed and updated:    Medical history  Family History  Social history  Allergies and Current Medications  Health Risk Assessment  Health Maintenance  Care Team    **See Completed Assessments for Annual Wellness visit with in the encounter summary    The following assessments were completed:  Living Situation  Depression Screening  CAGE  Cognitive function Screening  Timed Get Up Test  Whisper Test  ADL  PAQ      Opioid documentation:      Patient does not have a current opioid prescription.          Vitals:    09/25/24 0910   BP: 102/60   BP Location: Right arm   Patient Position: Sitting   BP Method: Medium (Manual)   Pulse: (!) 50   Resp: 18   SpO2: 96%   Weight: 86.7 kg (191 lb 0.5 oz)   Height: 5' 5" (1.651 m)     Body mass index is 31.79 kg/m².     Review of Systems   Constitutional:  Negative for chills, fever and weight loss.   HENT:  Negative for ear discharge, ear pain and tinnitus.    Eyes:  Negative for blurred vision and double vision.   Respiratory:  Negative for cough and shortness of breath.    Cardiovascular:  Negative for chest pain, palpitations, orthopnea and leg swelling.   Gastrointestinal:  Negative for constipation and diarrhea.   Genitourinary:  Negative for dysuria and hematuria.   Musculoskeletal:  Negative for back pain and myalgias.   Skin:  Negative for itching and rash.   Neurological:  Negative for dizziness and headaches.   Endo/Heme/Allergies:  Does not bruise/bleed easily.   Psychiatric/Behavioral:  Negative for depression. The patient does not have insomnia.       Physical Exam  Constitutional:       General: She is not in acute distress.     Appearance: She is not ill-appearing, toxic-appearing or diaphoretic.   HENT:      Head: Normocephalic and atraumatic.      Right Ear: External ear normal.      Left Ear: External ear normal.      Nose: No congestion or rhinorrhea.   Eyes:      " Pupils: Pupils are equal, round, and reactive to light.   Cardiovascular:      Rate and Rhythm: Normal rate and regular rhythm.   Pulmonary:      Effort: No respiratory distress.      Breath sounds: No wheezing.   Abdominal:      General: Bowel sounds are normal.   Musculoskeletal:         General: Normal range of motion.      Cervical back: Normal range of motion.   Skin:     General: Skin is warm and dry.   Neurological:      General: No focal deficit present.      Mental Status: She is oriented to person, place, and time.   Psychiatric:         Mood and Affect: Mood normal.         Thought Content: Thought content normal.        Media Information    File Link    Diagnoses and health risks identified today and associated recommendations/orders:  1. Encounter for Medicare annual wellness exam  -Counseled on age appropriate medical preventative services including age appropriate cancer screenings, age appropriate eye and dental exams, over all nutritional health, need for a consistent exercise regimen, and an over all push towards maintaining a vigorous and active lifestyle.  Counseled on age appropriate vaccines and discussed upcoming health care needs based on age/gender. Discussed good sleep hygiene and stress management.        2. Encounter for preventive health examination  -Counseled on age appropriate medical preventative services including age appropriate cancer screenings, age appropriate eye and dental exams, over all nutritional health, need for a consistent exercise regimen, and an over all push towards maintaining a vigorous and active lifestyle.  Counseled on age appropriate vaccines and discussed upcoming health care needs based on age/gender. Discussed good sleep hygiene and stress management.        3. Depression with anxiety  -The current medical regimen is effective. Continue present plan and medications.      4. Primary osteoarthritis involving multiple joints  -The current medical regimen is  "effective. Continue present plan and medications.      5. Chronic rhinitis  -The current medical regimen is effective. Continue present plan and medications.      6. Family history of diabetes mellitus  -HA1C 5.3    7. HERVE (obstructive sleep apnea)  -The current medical regimen is effective. Continue CPAP QHS.      8. Osteopenia, unspecified location  -The current medical regimen is effective. Continue present plan and medications.      9. Oral herpes simplex, not currently active  -The current medical regimen is effective. Continue present plan and medications.      10. Class 1 obesity due to excess calories with serious comorbidity and body mass index (BMI) of 31.0 to 31.9 in adult  - Noted. Pt counseled on diet and exercise for healthy lifestyle.     11. History of asthma  -"Childhood asthma"; No issues; Stable    12. Endometrial Cancer  -Per Gynecologic Oncology: Continue surveillance with visits every 6 months for the first 2 years and then annually until 5 years.     13. Advanced directives, counseling/discussion  -I offered to discuss advanced care planning, including how to pick a person who would make decisions for you if you were unable to make them for yourself, called a health care power of , and what kind of decisions you might make such as use of life sustaining treatments such as ventilators and tube feeding when faced with a life limiting illness recorded on a living will that they will need to know. (How you want to be cared for as you near the end of your natural life)       X Patient is interested in learning more about how to make advanced directives.  I provided them paperwork and offered to discuss this with them. Patient filled out POA and it will be placed in Epic     Provided Lenore with a 5-10 year written screening schedule and personal prevention plan. Recommendations were developed using the USPSTF age appropriate recommendations. Education, counseling, and referrals were provided " as needed.  After Visit Summary printed and given to patient which includes a list of additional screenings\tests needed.    Follow up in about 1 year (around 9/25/2025) for AWV.      Katrin oRberts APRN

## 2024-09-25 NOTE — PATIENT INSTRUCTIONS
It was a pleasure to meet you.      Follow up in 1 year for AWV.     Please Follow up with PCP for Routine Care.      Please get needed vaccine done at local pharmacy.        The following information is provided to all patients.  This information is to help you find resources for any of the problems found today that may be affecting your health:                Living healthy guide: www.[facility].[place].[facility]       Understanding Diabetes: www.diabetes.org       Eating healthy: www.cdc.gov/healthyweight      CDC home safety checklist: www.cdc.gov/steadi/patient.html      Agency on Aging: www.goea.[place].[facility]       Alcoholics anonymous (AA): www.aa.org      Physical Activity: www.trinity.nih.gov/io6jebh       Tobacco use: www.quitwithusla.org

## 2024-10-13 DIAGNOSIS — F41.8 DEPRESSION WITH ANXIETY: ICD-10-CM

## 2024-10-13 RX ORDER — ESCITALOPRAM OXALATE 20 MG/1
20 TABLET ORAL
Qty: 90 TABLET | Refills: 0 | Status: SHIPPED | OUTPATIENT
Start: 2024-10-13

## 2024-10-13 NOTE — TELEPHONE ENCOUNTER
Refill Decision Note   Lenore Subramanian  is requesting a refill authorization.  Brief Assessment and Rationale for Refill:  Approve     Medication Therapy Plan:       Medication Reconciliation Completed: No   Comments:     No Care Gaps recommended.     Note composed:3:02 PM 10/13/2024

## 2024-10-13 NOTE — TELEPHONE ENCOUNTER
No care due was identified.  [facility] Embedded Care Due Messages. Reference number: [id number].   10/13/2024 7:31:51 AM CDT

## 2024-11-11 ENCOUNTER — TELEPHONE (OUTPATIENT)
Dept: FAMILY MEDICINE | Facility: CLINIC | Age: 74
End: 2024-11-11
Payer: MEDICARE

## 2024-11-11 DIAGNOSIS — Z00.00 ROUTINE PHYSICAL EXAMINATION: Primary | ICD-10-CM

## 2024-11-11 DIAGNOSIS — R79.9 ABNORMAL FINDING OF BLOOD CHEMISTRY, UNSPECIFIED: ICD-10-CM

## 2024-11-11 DIAGNOSIS — R93.89 ABNORMAL FINDINGS ON DIAGNOSTIC IMAGING OF OTHER SPECIFIED BODY STRUCTURES: ICD-10-CM

## 2024-11-11 NOTE — TELEPHONE ENCOUNTER
----- Message from Hailey sent at 11/11/2024  3:29 PM CST -----  Type: Lab     Caller is requesting to schedule their Lab appointment prior to annual appointment.    Order is not listed in EPIC.  Please enter order and contact patient to schedule.     Name of Caller: Self     referred Date and Time of Labs: 12/12/24    Date of EPP Appointment: 12/16/24    Where would they like the lab performed?  [place]     Would the patient rather a call back or a response via [facility]? Call Back     Best Call Back Number: [phone] (home)       Additional Information:     Thank you

## 2024-11-25 ENCOUNTER — OFFICE VISIT (OUTPATIENT)
Dept: DERMATOLOGY | Facility: CLINIC | Age: 74
End: 2024-11-25
Payer: MEDICARE

## 2024-11-25 DIAGNOSIS — L82.1 STUCCO KERATOSES: Primary | ICD-10-CM

## 2024-11-25 DIAGNOSIS — L82.1 SEBORRHEIC KERATOSES: ICD-10-CM

## 2024-11-25 DIAGNOSIS — Z12.83 SCREENING EXAM FOR SKIN CANCER: ICD-10-CM

## 2024-11-25 DIAGNOSIS — D18.01 CHERRY ANGIOMA: ICD-10-CM

## 2024-11-25 PROCEDURE — 3288F FALL RISK ASSESSMENT DOCD: CPT | Mod: HCNC,CPTII,S$GLB, | Performed by: STUDENT IN AN ORGANIZED HEALTH CARE EDUCATION/TRAINING PROGRAM

## 2024-11-25 PROCEDURE — 1159F MED LIST DOCD IN RCRD: CPT | Mod: HCNC,CPTII,S$GLB, | Performed by: STUDENT IN AN ORGANIZED HEALTH CARE EDUCATION/TRAINING PROGRAM

## 2024-11-25 PROCEDURE — 99203 OFFICE O/P NEW LOW 30 MIN: CPT | Mod: HCNC,S$GLB,, | Performed by: STUDENT IN AN ORGANIZED HEALTH CARE EDUCATION/TRAINING PROGRAM

## 2024-11-25 PROCEDURE — 1101F PT FALLS ASSESS-DOCD LE1/YR: CPT | Mod: HCNC,CPTII,S$GLB, | Performed by: STUDENT IN AN ORGANIZED HEALTH CARE EDUCATION/TRAINING PROGRAM

## 2024-11-25 PROCEDURE — 1126F AMNT PAIN NOTED NONE PRSNT: CPT | Mod: HCNC,CPTII,S$GLB, | Performed by: STUDENT IN AN ORGANIZED HEALTH CARE EDUCATION/TRAINING PROGRAM

## 2024-11-25 PROCEDURE — 1160F RVW MEDS BY RX/DR IN RCRD: CPT | Mod: HCNC,CPTII,S$GLB, | Performed by: STUDENT IN AN ORGANIZED HEALTH CARE EDUCATION/TRAINING PROGRAM

## 2024-11-25 PROCEDURE — 99999 PR PBB SHADOW E&M-EST. PATIENT-LVL III: CPT | Mod: PBBFAC,HCNC,, | Performed by: STUDENT IN AN ORGANIZED HEALTH CARE EDUCATION/TRAINING PROGRAM

## 2024-11-25 PROCEDURE — 1157F ADVNC CARE PLAN IN RCRD: CPT | Mod: HCNC,CPTII,S$GLB, | Performed by: STUDENT IN AN ORGANIZED HEALTH CARE EDUCATION/TRAINING PROGRAM

## 2024-11-25 NOTE — PROGRESS NOTES
Subjective:      Patient ID:  Lenore Subramanian is a 74 y.o. female who presents for No chief complaint on file.    eLnore Subramanian is a 74 y.o. female who presents for: FBSE screening exam for skin cancer.    Last office visit 3/22/2016 with Dr. Sylvester    The patient has the following lesions of concern:  Location: left thigh   Duration: months   Symptoms: Irritated, inflamed  Relieving factors/Previous treatments: tea tree oil and a bandaid     Pertinent history:  History of blistering sunburns: Yes- childhood   History of tanning bed use: No  Family history of melanoma: No  Personal history of mole removal: No  Personal history of skin cancer: No       Review of Systems   Skin:  Positive for activity-related sunscreen use. Negative for daily sunscreen use and recent sunburn.   Hematologic/Lymphatic: Does not bruise/bleed easily.       Objective:   Physical Exam   Constitutional: She appears well-developed and well-nourished. No distress.   Neurological: She is alert and oriented to person, place, and time. She is not disoriented.   Psychiatric: She has a normal mood and affect.   Skin:   Areas Examined (abnormalities noted in diagram):   Scalp / Hair Palpated and Inspected  Head / Face Inspection Performed  Neck Inspection Performed  Chest / Axilla Inspection Performed  Abdomen Inspection Performed  Genitals / Buttocks / Groin Inspection Performed  Back Inspection Performed  RUE Inspected  LUE Inspection Performed  RLE Inspected  LLE Inspection Performed  Nails and Digits Inspection Performed                 Diagram Legend     Erythematous scaling macule/papule c/w actinic keratosis       Vascular papule c/w angioma      Pigmented verrucoid papule/plaque c/w seborrheic keratosis      Yellow umbilicated papule c/w sebaceous hyperplasia      Irregularly shaped tan macule c/w lentigo     1-2 mm smooth white papules consistent with Milia      Movable subcutaneous cyst with punctum c/w epidermal inclusion cyst       Subcutaneous movable cyst c/w pilar cyst      Firm pink to brown papule c/w dermatofibroma      Pedunculated fleshy papule(s) c/w skin tag(s)      Evenly pigmented macule c/w junctional nevus     Mildly variegated pigmented, slightly irregular-bordered macule c/w mildly atypical nevus      Flesh colored to evenly pigmented papule c/w intradermal nevus       Pink pearly papule/plaque c/w basal cell carcinoma      Erythematous hyperkeratotic cursted plaque c/w SCC      Surgical scar with no sign of skin cancer recurrence      Open and closed comedones      Inflammatory papules and pustules      Verrucoid papule consistent consistent with wart     Erythematous eczematous patches and plaques     Dystrophic onycholytic nail with subungual debris c/w onychomycosis     Umbilicated papule    Erythematous-base heme-crusted tan verrucoid plaque consistent with inflamed seborrheic keratosis     Erythematous Silvery Scaling Plaque c/w Psoriasis     See annotation      Assessment / Plan:        Stucco keratoses  Urea 20-40% cream recommended ie Cetaphil rough and bumpy cream    Seborrheic keratoses  These are benign inherited growths without a malignant potential. Reassurance given to patient. No treatment is necessary.     Cherry angioma  This is a benign vascular lesion. Reassurance given. No treatment required.     Screening exam for skin cancer  Total body skin examination performed today including at least 12 points as noted in physical examination. No lesions suspicious for malignancy noted.    Recommend daily sun protection/avoidance, use of at least SPF 30, broad spectrum sunscreen (OTC drug), skin self examinations, and routine physician surveillance to optimize early detection    RTC 1 year, sooner prn

## 2024-12-09 ENCOUNTER — PATIENT OUTREACH (OUTPATIENT)
Dept: ADMINISTRATIVE | Facility: HOSPITAL | Age: 74
End: 2024-12-09
Payer: MEDICARE

## 2024-12-12 ENCOUNTER — LAB VISIT (OUTPATIENT)
Dept: LAB | Facility: HOSPITAL | Age: 74
End: 2024-12-12
Attending: FAMILY MEDICINE
Payer: MEDICARE

## 2024-12-12 DIAGNOSIS — R93.89 ABNORMAL FINDINGS ON DIAGNOSTIC IMAGING OF OTHER SPECIFIED BODY STRUCTURES: ICD-10-CM

## 2024-12-12 DIAGNOSIS — R79.9 ABNORMAL FINDING OF BLOOD CHEMISTRY, UNSPECIFIED: ICD-10-CM

## 2024-12-12 DIAGNOSIS — Z00.00 ROUTINE PHYSICAL EXAMINATION: ICD-10-CM

## 2024-12-12 LAB
ALBUMIN SERPL BCP-MCNC: 4.1 G/DL (ref 3.5–5.2)
ALP SERPL-CCNC: 93 U/L (ref 40–150)
ALT SERPL W/O P-5'-P-CCNC: 13 U/L (ref 10–44)
ANION GAP SERPL CALC-SCNC: 10 MMOL/L (ref 8–16)
AST SERPL-CCNC: 22 U/L (ref 10–40)
BASOPHILS # BLD AUTO: 0.03 K/UL (ref 0–0.2)
BASOPHILS NFR BLD: 0.5 % (ref 0–1.9)
BILIRUB SERPL-MCNC: 0.8 MG/DL (ref 0.1–1)
BUN SERPL-MCNC: 12 MG/DL (ref 8–23)
CALCIUM SERPL-MCNC: 9.2 MG/DL (ref 8.7–10.5)
CHLORIDE SERPL-SCNC: 103 MMOL/L (ref 95–110)
CHOLEST SERPL-MCNC: 218 MG/DL (ref 120–199)
CHOLEST/HDLC SERPL: 3.5 {RATIO} (ref 2–5)
CO2 SERPL-SCNC: 25 MMOL/L (ref 23–29)
CREAT SERPL-MCNC: 0.8 MG/DL (ref 0.5–1.4)
DIFFERENTIAL METHOD BLD: ABNORMAL
EOSINOPHIL # BLD AUTO: 0.1 K/UL (ref 0–0.5)
EOSINOPHIL NFR BLD: 1.8 % (ref 0–8)
ERYTHROCYTE [DISTWIDTH] IN BLOOD BY AUTOMATED COUNT: 13.2 % (ref 11.5–14.5)
EST. GFR  (NO RACE VARIABLE): >60 ML/MIN/1.73 M^2
ESTIMATED AVG GLUCOSE: 105 MG/DL (ref 68–131)
GLUCOSE SERPL-MCNC: 86 MG/DL (ref 70–110)
HBA1C MFR BLD: 5.3 % (ref 4–5.6)
HCT VFR BLD AUTO: 48.3 % (ref 37–48.5)
HDLC SERPL-MCNC: 63 MG/DL (ref 40–75)
HDLC SERPL: 28.9 % (ref 20–50)
HGB BLD-MCNC: 15.3 G/DL (ref 12–16)
IMM GRANULOCYTES # BLD AUTO: 0.02 K/UL (ref 0–0.04)
IMM GRANULOCYTES NFR BLD AUTO: 0.3 % (ref 0–0.5)
LDLC SERPL CALC-MCNC: 136 MG/DL (ref 63–159)
LYMPHOCYTES # BLD AUTO: 2.1 K/UL (ref 1–4.8)
LYMPHOCYTES NFR BLD: 31.7 % (ref 18–48)
MCH RBC QN AUTO: 31 PG (ref 27–31)
MCHC RBC AUTO-ENTMCNC: 31.7 G/DL (ref 32–36)
MCV RBC AUTO: 98 FL (ref 82–98)
MONOCYTES # BLD AUTO: 0.6 K/UL (ref 0.3–1)
MONOCYTES NFR BLD: 8.3 % (ref 4–15)
NEUTROPHILS # BLD AUTO: 3.8 K/UL (ref 1.8–7.7)
NEUTROPHILS NFR BLD: 57.4 % (ref 38–73)
NONHDLC SERPL-MCNC: 155 MG/DL
NRBC BLD-RTO: 0 /100 WBC
PLATELET # BLD AUTO: 241 K/UL (ref 150–450)
PMV BLD AUTO: 11.1 FL (ref 9.2–12.9)
POTASSIUM SERPL-SCNC: 4 MMOL/L (ref 3.5–5.1)
PROT SERPL-MCNC: 6.9 G/DL (ref 6–8.4)
RBC # BLD AUTO: 4.94 M/UL (ref 4–5.4)
SODIUM SERPL-SCNC: 138 MMOL/L (ref 136–145)
T4 FREE SERPL-MCNC: 0.9 NG/DL (ref 0.71–1.51)
TRIGL SERPL-MCNC: 95 MG/DL (ref 30–150)
TSH SERPL DL<=0.005 MIU/L-ACNC: 2.56 UIU/ML (ref 0.4–4)
WBC # BLD AUTO: 6.63 K/UL (ref 3.9–12.7)

## 2024-12-12 PROCEDURE — 83036 HEMOGLOBIN GLYCOSYLATED A1C: CPT | Mod: HCNC | Performed by: FAMILY MEDICINE

## 2024-12-12 PROCEDURE — 84443 ASSAY THYROID STIM HORMONE: CPT | Mod: HCNC | Performed by: FAMILY MEDICINE

## 2024-12-12 PROCEDURE — 80061 LIPID PANEL: CPT | Mod: HCNC | Performed by: FAMILY MEDICINE

## 2024-12-12 PROCEDURE — 36415 COLL VENOUS BLD VENIPUNCTURE: CPT | Mod: HCNC,PO | Performed by: FAMILY MEDICINE

## 2024-12-12 PROCEDURE — 80053 COMPREHEN METABOLIC PANEL: CPT | Mod: HCNC | Performed by: FAMILY MEDICINE

## 2024-12-12 PROCEDURE — 84439 ASSAY OF FREE THYROXINE: CPT | Mod: HCNC | Performed by: FAMILY MEDICINE

## 2024-12-12 PROCEDURE — 85025 COMPLETE CBC W/AUTO DIFF WBC: CPT | Mod: HCNC | Performed by: FAMILY MEDICINE

## 2024-12-17 ENCOUNTER — OFFICE VISIT (OUTPATIENT)
Dept: FAMILY MEDICINE | Facility: CLINIC | Age: 74
End: 2024-12-17
Payer: MEDICARE

## 2024-12-17 VITALS
TEMPERATURE: 98 F | HEART RATE: 59 BPM | DIASTOLIC BLOOD PRESSURE: 64 MMHG | HEIGHT: 65 IN | BODY MASS INDEX: 32.64 KG/M2 | OXYGEN SATURATION: 98 % | WEIGHT: 195.88 LBS | SYSTOLIC BLOOD PRESSURE: 126 MMHG

## 2024-12-17 DIAGNOSIS — Z00.00 ROUTINE PHYSICAL EXAMINATION: Primary | ICD-10-CM

## 2024-12-17 DIAGNOSIS — Z23 NEED FOR IMMUNIZATION AGAINST INFLUENZA: ICD-10-CM

## 2024-12-17 DIAGNOSIS — Z12.31 ENCOUNTER FOR SCREENING MAMMOGRAM FOR BREAST CANCER: ICD-10-CM

## 2024-12-17 PROCEDURE — 1101F PT FALLS ASSESS-DOCD LE1/YR: CPT | Mod: HCNC,CPTII,S$GLB, | Performed by: FAMILY MEDICINE

## 2024-12-17 PROCEDURE — 1157F ADVNC CARE PLAN IN RCRD: CPT | Mod: HCNC,CPTII,S$GLB, | Performed by: FAMILY MEDICINE

## 2024-12-17 PROCEDURE — G0008 ADMIN INFLUENZA VIRUS VAC: HCPCS | Mod: HCNC,S$GLB,, | Performed by: FAMILY MEDICINE

## 2024-12-17 PROCEDURE — 99397 PER PM REEVAL EST PAT 65+ YR: CPT | Mod: HCNC,25,S$GLB, | Performed by: FAMILY MEDICINE

## 2024-12-17 PROCEDURE — 3078F DIAST BP <80 MM HG: CPT | Mod: HCNC,CPTII,S$GLB, | Performed by: FAMILY MEDICINE

## 2024-12-17 PROCEDURE — 3044F HG A1C LEVEL LT 7.0%: CPT | Mod: HCNC,CPTII,S$GLB, | Performed by: FAMILY MEDICINE

## 2024-12-17 PROCEDURE — 90653 IIV ADJUVANT VACCINE IM: CPT | Mod: HCNC,S$GLB,, | Performed by: FAMILY MEDICINE

## 2024-12-17 PROCEDURE — 1159F MED LIST DOCD IN RCRD: CPT | Mod: HCNC,CPTII,S$GLB, | Performed by: FAMILY MEDICINE

## 2024-12-17 PROCEDURE — 3074F SYST BP LT 130 MM HG: CPT | Mod: HCNC,CPTII,S$GLB, | Performed by: FAMILY MEDICINE

## 2024-12-17 PROCEDURE — 3288F FALL RISK ASSESSMENT DOCD: CPT | Mod: HCNC,CPTII,S$GLB, | Performed by: FAMILY MEDICINE

## 2024-12-17 PROCEDURE — 3008F BODY MASS INDEX DOCD: CPT | Mod: HCNC,CPTII,S$GLB, | Performed by: FAMILY MEDICINE

## 2024-12-17 PROCEDURE — 1126F AMNT PAIN NOTED NONE PRSNT: CPT | Mod: HCNC,CPTII,S$GLB, | Performed by: FAMILY MEDICINE

## 2024-12-17 PROCEDURE — 99999 PR PBB SHADOW E&M-EST. PATIENT-LVL IV: CPT | Mod: PBBFAC,HCNC,, | Performed by: FAMILY MEDICINE

## 2024-12-17 NOTE — PROGRESS NOTES
[facility]  Progress Note    SUBJECTIVE:     Chief Complaint   Patient presents with    Annual Exam       HPI   Lenore Subramanian  is a 74 y.o. female here for physical exam. Patient has no other new complaints/problems at this time.      Review of patient's allergies indicates:   Allergen Reactions    Chlorhexidine Rash    Poison ivy [vit e-nonoxynol 9-aloe vera] Rash       Past Medical History:   Diagnosis Date    Anxiety     Asthma     as a child    Endometrial cancer 8/15/2023    Mental disorder     Depression    HERVE (obstructive sleep apnea)      Past Surgical History:   Procedure Laterality Date    COLONOSCOPY N/A 10/25/2016    Procedure: COLONOSCOPY;  Surgeon: JANINA Dominguez MD;  Location: [address]);  Service: Endoscopy;  Laterality: N/A;    LYMPH NODE BIOPSY N/A 8/29/2023    Procedure: BIOPSY, LYMPH NODE;  Surgeon: Berry Jackson MD;  Location: [facility];  Service: OB/GYN;  Laterality: N/A;    LYSIS OF ADHESIONS OF URETER Left 8/29/2023    Procedure: URETEROLYSIS, ROBOTIC;  Surgeon: Berry Jackson MD;  Location: [facility];  Service: OB/GYN;  Laterality: Left;    MAPPING, LYMPH NODE, SENTINEL N/A 8/29/2023    Procedure: MAPPING, LYMPH NODE, SENTINEL;  Surgeon: Berry Jackson MD;  Location: [facility];  Service: OB/GYN;  Laterality: N/A;    ROBOT-ASSISTED LAPAROSCOPIC ABDOMINAL HYSTERECTOMY USING DA JUAN XI N/A 8/29/2023    Procedure: XI ROBOTIC HYSTERECTOMY;  Surgeon: Berry Jackson MD;  Location: [facility];  Service: OB/GYN;  Laterality: N/A;  3 hr case / MEDICARE COVERAGE MUST BE SURGERY ADMIT    ROBOT-ASSISTED LAPAROSCOPIC SALPINGO-OOPHORECTOMY USING DA JUAN XI Bilateral 8/29/2023    Procedure: XI ROBOTIC SALPINGO-OOPHORECTOMY;  Surgeon: Berry Jackson MD;  Location: [facility];  Service: OB/GYN;  Laterality: Bilateral;    TONSILLECTOMY  1958    TUBAL LIGATION  1979    PP BTL     Family History   Problem Relation Name Age of Onset    Hypertension Mother       Multiple myeloma Father      Colon cancer Sister x 1 61    Hypertrophic cardiomyopathy Son      Breast cancer Maternal Aunt  72    Breast cancer Paternal Aunt  58    Diabetes Paternal Grandmother      Heart disease Neg Hx      Stroke Neg Hx      Ovarian cancer Neg Hx      Melanoma Neg Hx       Social History     Tobacco Use    Smoking status: Never     Passive exposure: Never    Smokeless tobacco: Never   Substance Use Topics    Alcohol use: Yes     Alcohol/week: 7.0 standard drinks of alcohol     Types: 7 Glasses of wine per week     Comment: occasionally    Drug use: No        Review of Systems   Constitutional:  Negative for chills, diaphoresis and fever.   HENT:  Negative for congestion, ear pain and sore throat.    Eyes:  Negative for photophobia and discharge.   Respiratory:  Negative for cough, shortness of breath and wheezing.    Cardiovascular:  Negative for chest pain and palpitations.   Gastrointestinal:  Negative for abdominal pain, constipation, diarrhea, nausea and vomiting.   Genitourinary:  Negative for dysuria and hematuria.   Musculoskeletal:  Negative for back pain and myalgias.   Skin:  Negative for itching and rash.   Neurological:  Negative for dizziness, sensory change, focal weakness, weakness and headaches.   All other systems reviewed and are negative.    OBJECTIVE:     Vitals:    12/17/24 0924   BP: 126/64   Pulse: (!) 59   Temp: 98.3 °F (36.8 °C)     Body mass index is 32.6 kg/m².    Physical Exam  Constitutional:       General: She is not in acute distress.     Appearance: She is not diaphoretic.   HENT:      Head: Normocephalic and atraumatic.      Right Ear: Tympanic membrane and ear canal normal. No hemotympanum. Tympanic membrane is not perforated, erythematous or bulging.      Left Ear: Tympanic membrane and ear canal normal. No hemotympanum. Tympanic membrane is not perforated, erythematous or bulging.   Eyes:      Conjunctiva/sclera: Conjunctivae normal.      Pupils: Pupils are  equal, round, and reactive to light.   Neck:      Thyroid: No thyromegaly.   Cardiovascular:      Rate and Rhythm: Normal rate and regular rhythm.      Heart sounds: Normal heart sounds. No murmur heard.     No friction rub. No gallop.   Pulmonary:      Effort: Pulmonary effort is normal. No respiratory distress.      Breath sounds: Normal breath sounds. No wheezing or rales.   Abdominal:      General: Bowel sounds are normal. There is no distension.      Palpations: Abdomen is soft.      Tenderness: There is no abdominal tenderness. There is no guarding or rebound.   Musculoskeletal:         General: No tenderness. Normal range of motion.   Skin:     General: Skin is warm.      Findings: No erythema or rash.   Neurological:      Mental Status: She is alert and oriented to person, place, and time.         Old records were reviewed. Labs and/or images were independently reviewed.    ASSESSMENT     1. Routine physical examination    2. Need for immunization against influenza    3. Encounter for screening mammogram for breast cancer        PLAN:     Routine physical examination   -     We briefly discussed diet, exercise, and routine preventive exams. All questions and comments addressed.    Need for immunization against influenza  -     influenza (adjuvanted) (Fluad) 45 mcg/0.5 mL IM vaccine (> or = 66 yo) 0.5 mL    Encounter for screening mammogram for breast cancer  -     Mammo Digital Screening Bilat; Future; Expected date: 06/17/2025      RTC PRIVA Allen MD  12/17/2024 9:44 AM

## 2024-12-20 ENCOUNTER — TELEPHONE (OUTPATIENT)
Dept: GYNECOLOGIC ONCOLOGY | Facility: CLINIC | Age: 74
End: 2024-12-20
Payer: MEDICARE

## 2024-12-21 DIAGNOSIS — J30.2 SEASONAL ALLERGIC RHINITIS, UNSPECIFIED TRIGGER: ICD-10-CM

## 2024-12-23 RX ORDER — FLUTICASONE PROPIONATE 50 MCG
1 SPRAY, SUSPENSION (ML) NASAL 2 TIMES DAILY
Qty: 48 G | Refills: 11 | Status: SHIPPED | OUTPATIENT
Start: 2024-12-23

## 2024-12-26 DIAGNOSIS — F41.8 DEPRESSION WITH ANXIETY: ICD-10-CM

## 2024-12-26 RX ORDER — ESCITALOPRAM OXALATE 20 MG/1
20 TABLET ORAL
Qty: 90 TABLET | Refills: 3 | Status: SHIPPED | OUTPATIENT
Start: 2024-12-26

## 2024-12-26 NOTE — TELEPHONE ENCOUNTER
No care due was identified.  [facility] Embedded Care Due Messages. Reference number: [id number].   12/26/2024 2:38:03 AM CST

## 2024-12-26 NOTE — TELEPHONE ENCOUNTER
Refill Decision Note   Lenore Calvillonce  is requesting a refill authorization.  Brief Assessment and Rationale for Refill:  Approve     Medication Therapy Plan:         Comments:     Note composed:12:09 PM 12/26/2024

## 2025-01-09 ENCOUNTER — OFFICE VISIT (OUTPATIENT)
Dept: GYNECOLOGIC ONCOLOGY | Facility: CLINIC | Age: 75
End: 2025-01-09
Payer: MEDICARE

## 2025-01-09 VITALS
RESPIRATION RATE: 18 BRPM | OXYGEN SATURATION: 98 % | HEIGHT: 64 IN | HEART RATE: 53 BPM | WEIGHT: 196 LBS | SYSTOLIC BLOOD PRESSURE: 112 MMHG | TEMPERATURE: 99 F | BODY MASS INDEX: 33.46 KG/M2 | DIASTOLIC BLOOD PRESSURE: 54 MMHG

## 2025-01-09 DIAGNOSIS — C54.1 ENDOMETRIAL CANCER: Primary | ICD-10-CM

## 2025-01-09 PROCEDURE — 99999 PR PBB SHADOW E&M-EST. PATIENT-LVL IV: CPT | Mod: PBBFAC,HCNC,, | Performed by: STUDENT IN AN ORGANIZED HEALTH CARE EDUCATION/TRAINING PROGRAM
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
## 2025-01-09 NOTE — Clinical Note
Tho, I had the pleasure of seeing Lenore today. She's doing very well nearly 18 months out from her endometrial cancer. No signs of symptoms of recurrence and we will continue surveillance.  Thanks again for sending her to see me. Berry

## 2025-01-30 ENCOUNTER — HOSPITAL ENCOUNTER (OUTPATIENT)
Dept: RADIOLOGY | Facility: HOSPITAL | Age: 75
Discharge: HOME | End: 2025-01-30
Attending: FAMILY MEDICINE
Payer: MEDICARE

## 2025-01-30 DIAGNOSIS — Z12.31 ENCOUNTER FOR SCREENING MAMMOGRAM FOR BREAST CANCER: ICD-10-CM

## 2025-01-30 PROCEDURE — 77067 SCR MAMMO BI INCL CAD: CPT | Mod: 26,HCNC,, | Performed by: RADIOLOGY

## 2025-01-30 PROCEDURE — 77063 BREAST TOMOSYNTHESIS BI: CPT | Mod: 26,HCNC,, | Performed by: RADIOLOGY

## 2025-01-30 PROCEDURE — 77067 SCR MAMMO BI INCL CAD: CPT | Mod: TC,HCNC,PO

## 2025-02-24 DIAGNOSIS — Z00.00 ENCOUNTER FOR MEDICARE ANNUAL WELLNESS EXAM: ICD-10-CM

## 2025-06-26 ENCOUNTER — OFFICE VISIT (OUTPATIENT)
Dept: FAMILY MEDICINE | Facility: CLINIC | Age: 75
End: 2025-06-26
Payer: MEDICARE

## 2025-06-26 VITALS
DIASTOLIC BLOOD PRESSURE: 72 MMHG | SYSTOLIC BLOOD PRESSURE: 112 MMHG | BODY MASS INDEX: 33.98 KG/M2 | OXYGEN SATURATION: 96 % | TEMPERATURE: 98 F | HEART RATE: 51 BPM | HEIGHT: 64 IN | WEIGHT: 199.06 LBS

## 2025-06-26 DIAGNOSIS — Z71.89 ADVANCED DIRECTIVES, COUNSELING/DISCUSSION: ICD-10-CM

## 2025-06-26 DIAGNOSIS — C54.1 ENDOMETRIAL CANCER: ICD-10-CM

## 2025-06-26 DIAGNOSIS — G47.33 OSA (OBSTRUCTIVE SLEEP APNEA): ICD-10-CM

## 2025-06-26 DIAGNOSIS — E66.811 CLASS 1 OBESITY DUE TO EXCESS CALORIES WITH SERIOUS COMORBIDITY AND BODY MASS INDEX (BMI) OF 30.0 TO 30.9 IN ADULT: ICD-10-CM

## 2025-06-26 DIAGNOSIS — Z00.00 ENCOUNTER FOR MEDICARE ANNUAL WELLNESS EXAM: Primary | ICD-10-CM

## 2025-06-26 DIAGNOSIS — E66.09 CLASS 1 OBESITY DUE TO EXCESS CALORIES WITH SERIOUS COMORBIDITY AND BODY MASS INDEX (BMI) OF 30.0 TO 30.9 IN ADULT: ICD-10-CM

## 2025-06-26 PROCEDURE — 99999 PR PBB SHADOW E&M-EST. PATIENT-LVL IV: CPT | Mod: PBBFAC,HCNC,, | Performed by: NURSE PRACTITIONER

## 2025-06-26 RX ORDER — TIRZEPATIDE 2.5 MG/.5ML
2.5 INJECTION, SOLUTION SUBCUTANEOUS
Qty: 2 ML | Refills: 0 | Status: SHIPPED | OUTPATIENT
Start: 2025-06-26 | End: 2025-07-25

## 2025-06-26 RX ORDER — TIRZEPATIDE 2.5 MG/.5ML
2.5 INJECTION, SOLUTION SUBCUTANEOUS
Qty: 2 ML | Refills: 0 | Status: SHIPPED | OUTPATIENT
Start: 2025-06-26 | End: 2025-06-26

## 2025-06-26 NOTE — PROGRESS NOTES
"  Lenore Subramanian presented for a follow-up Medicare AWV today. The following components were reviewed and updated:    Medical history  Family History  Social history  Allergies and Current Medications  Health Risk Assessment  Health Maintenance  Care Team    **See Completed Assessments for Annual Wellness visit with in the encounter summary    The following assessments were completed:  Depression Screening  Cognitive function Screening  Timed Get Up Test  Whisper Test      Opioid documentation:      Patient does not have a current opioid prescription.          Vitals:    06/26/25 0944   BP: 112/72   Patient Position: Sitting   Pulse: (!) 51   Temp: 97.9 °F (36.6 °C)   TempSrc: Oral   SpO2: 96%   Weight: 90.3 kg (199 lb 1.2 oz)   Height: 5' 4" (1.626 m)     Body mass index is 34.17 kg/m².       Physical Exam  Constitutional:       Appearance: She is obese. She is not ill-appearing.   HENT:      Head: Normocephalic and atraumatic.      Ears:      Comments: Hard of hearing    Eyes:      General: No scleral icterus.        Right eye: No discharge.         Left eye: No discharge.   Cardiovascular:      Rate and Rhythm: Regular rhythm. Bradycardia present.      Pulses: Normal pulses.      Heart sounds: No murmur heard.  Pulmonary:      Effort: Pulmonary effort is normal. No respiratory distress.      Breath sounds: Normal breath sounds. No wheezing.   Skin:     General: Skin is warm and dry.      Capillary Refill: Capillary refill takes 2 to 3 seconds.   Neurological:      Mental Status: She is alert and oriented to person, place, and time. Mental status is at baseline.   Psychiatric:         Mood and Affect: Mood normal.           Diagnoses and health risks identified today and associated recommendations/orders:  1. Encounter for Medicare annual wellness exam  Counseled on age appropriate medical preventative services including age appropriate cancer screenings, age appropriate eye and dental exams, over all nutritional " health, need for a consistent exercise regimen, and an over all push towards maintaining a vigorous and active lifestyle.  Counseled on age appropriate vaccines and discussed upcoming health care needs based on age/gender. Discussed good sleep hygiene and stress management.   - Referral to Enhanced Annual Wellness Visit (eAWV) M+6    2. Advanced directives, counseling/discussion  I offered to discuss advanced care planning, including how to pick a person who would make decisions for you if you were unable to make them for yourself, called a health care power of , and what kind of decisions you might make such as use of life sustaining treatments such as ventilators and tube feeding when faced with a life limiting illness recorded on a living will that they will need to know. (How you want to be cared for as you near the end of your natural life)     X Patient is interested in learning more about how to make advanced directives.  I provided them paperwork and offered to discuss this with them.     3. Class 1 obesity due to excess calories with serious comorbidity and body mass index (BMI) of 30.0 to 30.9 in adult  We discussed weight issues and safe, effective ways of losing pounds, includin) diet:  low carbohydrate, low fat diet, stay away from fast food, fried and processed food, use whole grain, lot of fruits and vegetables, use healthy fat such as avocado, nuts and olive oil in reasonable quantity, stay away from sodas. Regular meals with lean proteins.  2) physical activity: ideally 150 min a week, with cardiovascular and resistance activity.  Patient was encouraged to set realistic attainable goals for weight loss, and we will follow up periodically.  Mediterranean Diet recommendations (Adopted from Marek et al, NEJ, 2018.)  - Eat primarily plant-based foods, such as fruits and vegetables, whole grains, legumes (beans) and nuts  - Limit refined carbohydrates (white pasta, bread, rice).  -  Replace butter with healthy fats such as olive oil.  - Use herbs and spices instead of salt to flavor foods.  - Limit red meat and processed meats to no more than a few times a month.  - Avoid sugary sodas, bakery goods, and sweets.  - Eat fish and poultry at least twice a week.     4. HERVE (obstructive sleep apnea)  CPAP compliance: yes.   The patient reports that they continue to benefit from regular use of their CPAP machine.  We discussed the potential ramifications of untreated sleep apnea, which could include daytime sleepiness, hypertension, heart disease including CHF, sudden death while sleeping and/or stroke. The patient was advised to abstain from driving should they feel sleepy or drowsy.  We discussed potential treatment options, which could include weight loss, body positioning, continuous positive airway pressure (CPAP), or referral for surgical consideration.      5. Endometrial cancer  The current medical regimen is effective;  continue present plan           Provided Lenore with a 5-10 year written screening schedule and personal prevention plan. Recommendations were developed using the USPSTF age appropriate recommendations. Education, counseling, and referrals were provided as needed.  After Visit Summary printed and given to patient which includes a list of additional screenings\tests needed.    Follow up in about 2 weeks (around 7/10/2025), or if symptoms worsen or fail to improve.      Dallas Almeida NP

## 2025-06-26 NOTE — PATIENT INSTRUCTIONS
"Counseling and Referral of Other Preventative  (Italic type indicates deductible and co-insurance are waived)    Patient Name: Leonre Subramanian  Today's Date: 6/26/2025    Health Maintenance         Date Due Completion Date    RSV Vaccine (Age 60+ and Pregnant patients) (1 - Risk 60-74 years 1-dose series) Never done ---    COVID-19 Vaccine (4 - 2024-25 season) 12/17/2025 (Originally 9/1/2024) 10/20/2021    DEXA Scan 01/18/2026 1/18/2024    Mammogram 01/30/2026 1/30/2025    Override on 8/18/2017: Done    Colorectal Cancer Screening 10/25/2026 5/13/2021    TETANUS VACCINE 05/03/2027 5/3/2017    Lipid Panel 12/12/2029 12/12/2024          No orders of the defined types were placed in this encounter.    The following information is provided to all patients.  This information is to help you find resources for any of the problems found today that may be affecting your health:                  Living healthy guide: www.[facility].[place].[facility]      Understanding Diabetes: www.diabetes.org      Eating healthy: www.cdc.gov/healthyweight      [facility] home safety checklist: www.cdc.gov/steadi/patient.html      Agency on Aging: www.goea.[place].gov      Alcoholics anonymous (AA): www.aa.org      Physical Activity: www.trinity.nih.gov/yy8ciwr      Tobacco use: www.quitwithusla.org           Patient Education       Weight Loss Diet   About this topic   There are many "trendy" weight loss diets that are popular today. Many of these diets can end up being more harmful than helpful. The healthiest way to lose weight is to burn more calories than you eat.  A weight loss diet should help you have a healthy view of eating. It is NOT healthy to stop eating to try and lose weight. A good diet plan will help you cut down your food intake and make healthy choices.  A healthy weight loss goal is 1 to 2 pounds (0.5 to 1 kg) per week. Reducing calories in your diet, burning calories through exercise, or both can help you lose weight. Combining a healthy " diet with regular physical activity can help you get the best results.  To cut calories in your diet you can:  Switch from whole milk to 1% or skim milk.  Switch from regular cheese to low-fat or fat-free cheese.  Use healthier condiment choices:  Fat-free or low-fat sour cream or salad dressings  Spray butter  Diet syrups or jellies over regular  Try frozen yogurt as a dessert rather than eating ice cream.  Skip the chips. Snack on carrots, vegetables, or fruit. If chips are a favorite of yours, try the baked style and watch portion size.  Eat grilled, roasted, boiled, broiled, or baked meats. Avoid deep-frying. Choose skinless poultry, lean red meat, lean cuts of pork, and fish for good protein sources.  Try flavored no-calorie mendez. Do not drink soda and juices that have many calories.  Choose fruit instead of sweets.  General   Eating smaller meals more often may be helpful. This will keep you from overeating at your next meal. Also, eating meals slowly helps you feel full faster.  If eating 3 meals is a part of your lifestyle, choose more lean proteins and higher fiber foods to fill you up at each meal.  Do not skip meals. Most often if you skip a meal, you eat too much at the next meal.  Eat smaller portions. Use a smaller plate or bowl for meals, and when you are eating out, eat half and take the rest home.  Plan ahead. Plan your meals and grocery list before going to the store. Planning will keep you from getting meals from restaurants.  Do not go to the grocery store hungry. You are more likely to buy snacks that are not good for you.  Portion out snacks. When you are having a snack, instead of grabbing the whole bag, portion a small amount out to give yourself a stopping point.  Drink water before and after your meals to help fill you up without the calories.  When eating starchy foods, choose whole-grain products. These have a lot of fiber which will make you feel full. Fiber also helps lower  cholesterol and helps with bowel function.  If you need a helpful start, ask your doctor to send you to a dietitian for weight loss help.         What will the results be?   Losing excess weight will make your whole body healthier. You will have more energy for your daily activities and lower your risk for health problems.  What lifestyle changes are needed?   Stay active. Eating healthy is not always enough to lose weight. Burning calories by exercising is a big part of weight loss.  What foods are good to eat?   The key is to watch your portion sizes. It is best to choose foods that are lower in fat and calories.  Choose lean meats:  Boneless, skinless chicken breast  Pork loin  90% lean beef  Lean turkey meat  Fresh fish (not fried)  Choose low-fat dairy products:  1% or skim milk  Spray butter or margarine  Low-fat or fat-free cheese  Frozen yogurt or low-calorie ice cream  Choose fresh fruits, vegetables, beans and lentils, and whole wheat products more often.  Choose water to drink more often. Drink diet or no-calorie beverages when you want something other than water. Aim to get your calories from the foods you eat.  Choose smart snacks:  Fruits  Vegetables  Low-fat or nonfat yogurt  Low-fat or no-fat cheese, such as cottage cheese  Unsalted nuts  Hard-boiled egg  Hummus  Guacamole  Natural peanut butter  Popcorn with no butter ? use pepper, garlic, or another spice to taste  Whole grain crackers  What foods should be limited or avoided?   Limit high-fat, high-sodium, and high-calorie foods like:  Fried foods  Processed meats  Whole-fat dairy products  Candy, cookies, chips, pastries  Sausage, muñoz, any full-fat meats  Soda, juice  Beer, wine, and mixed drinks (alcohol)  Will there be any other care needed?   What do I do first before trying to lose weight?  Talk to your doctor and dietitian to see if you need to lose weight. Work with them to set your weight loss goals.  If you have a chronic illness, such  as high blood sugar or high blood pressure, ask a doctor or dietitian what diet and exercise is right for you.  Ask your doctor about how much you are able to exercise and what type of exercise is good for you.  Helpful tips   Keep a food journal to help keep you on track.  Join a support group.  Tips for burning calories:  If your workplace is near your house, choose to walk or bike to work instead of driving.  Take 20-minute walks each day. Walk around during your lunch break. You will not only burn calories, but will raise your energy for the rest of the day.  Take the stairs over the elevator.  Join a gym or exercise class with a friend.  Try to exercise 30 minutes a day for overall health. Three 10-minute sessions work too. Aim for 60 to 90 minutes a day to lose weight.  Drink lots of water before, during, and after exercise.  Where can I learn more?   Academy of Nutrition and Dietetics  https://www.eatright.org/health/weight-loss/your-health-and-your-weight/back-to-basics-for-healthy-weight-loss   Centers for Disease Control and Prevention  https://www.cdc.gov/healthyweight/healthy_eating/index.html   Familydoctor.org  https://familydoctor.org/nutrition-weight-loss-need-know-fad-diets/   NHS  https://www.nhs.uk/live-well/healthy-weight/12-tips-to-help-you-lose-weight/?tabname=you-and-your-weight   Last Reviewed Date   2021-06-24  Consumer Information Use and Disclaimer   This information is not specific medical advice and does not replace information you receive from your health care provider. This is only a brief summary of general information. It does NOT include all information about conditions, illnesses, injuries, tests, procedures, treatments, therapies, discharge instructions or life-style choices that may apply to you. You must talk with your health care provider for complete information about your health and treatment options. This information should not be used to decide whether or not to accept your  health care providers advice, instructions or recommendations. Only your health care provider has the knowledge and training to provide advice that is right for you.  Copyright   Copyright © 2021 UpToDate, Inc. and its affiliates and/or licensors. All rights reserved.  Patient Education       Obesity Discharge Instructions, Adult   About this topic   Obesity is a health problem where your weight is higher than it should be. Doctors use a method called body mass index or BMI to measure whether you are at a healthy weight for your height. The doctor uses your weight and your height to determine your BMI. A high BMI can be an indicator of high body fat. Obesity is different from being overweight. Being overweight means your BMI is 25 or higher. Being obese means your BMI is 30 or higher. If your BMI is 40 or higher, you are considered severely or morbidly obese. Being obese may lead to many health problems. It may make it hard for you to breathe and move easily. It may raise your risk for illnesses like high blood sugar and heart disease.  What care is needed at home?   Ask your doctor what you need to do when you go home. Make sure you understand everything the doctor says. This way you will know what you need to do.  Change your diet. Lower the calories in your diet. Ask your dietitian to help you set an eating plan that is right for you.  Get enough exercise. Talk to your doctor about the right amount of exercise for you.  Do not smoke.  Limit the amount of beer, wine, and mixed drinks (alcohol) you drink.  Keep a record of your weight. Write down what you eat and drink each day. This may help you be more aware of the choices you are making.  What drugs may be needed?   The doctor may order drugs to:  Treat health problems that may cause weight gain  Lower your appetite  Help you lose weight  Will physical activity be limited?   Talk to your doctor about the right amount of exercise for you. This is especially  important if you have heart problems, other illnesses, or if you recently had surgery.  Start slowly by doing more everyday activities like yard work or household chores.  Choose activities that you like to do.  What changes to diet are needed?   Whole grains are a good source of carbohydrates and fiber. Try to eat 3 to 5 servings of whole grain, high fiber foods each day. These are things like whole grain bread, bran cereals, brown rice, and whole wheat pasta.  Fruits and vegetables are good sources of fiber, vitamins, and minerals. Try to pick many kinds and colors. This will help you get different nutrients in your diet. Choose fresh, frozen, or canned fruits and vegetables. Buy plain, frozen fruits without added sugar. Buy plain, frozen vegetables without added salt and fat. Buy canned fruit in 100% juice or water. Avoid canned fruit in syrups. Buy canned vegetables with no salt added.  Milk is a good source of protein and some vitamins and minerals. Choose low-fat (1%) or fat-free milk. Eat nonfat or low-fat cheeses, ice creams, yogurt, and other dairy products.  Meats and beans are good sources of protein and iron. Eat more low-fat or lean meats like chicken without the skin, turkey without the skin, and fish. Eggs and peanut butter are good sources of protein as well. Dried peas, beans, and lentils are also good and contain fiber. Fatty fish, like salmon, tuna, mackerel, herring, and trout, are good to eat and have healthy omega-3 fats.  Good fats can give you long-term energy. These are found in fish, nuts, seeds, and avocados. Try using olive oil, safflower oil, and low-sodium, low-fat salad dressing as a topping on foods. Use canola, olive, or peanut oil for cooking. Other healthy oils include corn, sunflower, and soybean oils.  Limit sweets such as candy and sugary drinks. Try to drink water instead. Drink diet or no calorie beverages when you want something other than water.  Cut back on solid fats, like  butter, lard, and coconut oil.  Limit fatty foods such as desserts, fried foods, and chips.  Trans fats should be avoided. Most trans fats are found in processed foods, commercially baked goods, and fried foods and are very unhealthy. Saturated fat, which is different from trans fat, should be watched and limited if portions are too large. Saturated fat is found mainly in animal sources, such as meat and dairy products. Saturated fat is also found in coconut and palm oils.  Limit processed meats and most processed foods.  Limit eating out. If you choose to visit a restaurant, ask for the nutritional facts. You may also be able to find nutritional facts online. Then, you can make a plan and choose healthy items. Watch the portion size. Have large portions split and take part home for some other meal.  What problems could happen?   Low mood or self-esteem  Anxiety  Muscle pain, joint pain, or arthritis  Sleep apnea  Diabetes  High blood pressure  High cholesterol  Heart, lung, or breathing problems  Kidney or liver problems  Cancer  Gallstones  Increased sweating  Reduced fertility  Pregnancy complications  Gastroesophageal reflux disease  When do I need to call the doctor?   Signs of high or low blood sugar  Thoughts of hurting yourself  Teach Back: Helping You Understand   The Teach Back Method helps you understand the information we are giving you. After you talk with the staff, tell them in your own words what you learned. This helps to make sure the staff has described each thing clearly. It also helps to explain things that may have been confusing. Before going home, make sure you can do these:  I can tell you about my condition.  I can tell you what changes I need to make with my diet or drugs.  I can tell you what I will do if I have signs of a heart attack or stroke.  Where can I learn more?   Centers for Disease Control and Prevention  http://www.cdc.gov/obesity/adult/defining.html    NHS  http://www.nhs.uk/Conditions/Obesity/Pages/obesityprevention.aspx   Last Reviewed Date   2021-06-23  Consumer Information Use and Disclaimer   This information is not specific medical advice and does not replace information you receive from your health care provider. This is only a brief summary of general information. It does NOT include all information about conditions, illnesses, injuries, tests, procedures, treatments, therapies, discharge instructions or life-style choices that may apply to you. You must talk with your health care provider for complete information about your health and treatment options. This information should not be used to decide whether or not to accept your health care providers advice, instructions or recommendations. Only your health care provider has the knowledge and training to provide advice that is right for you.  Copyright   Copyright © 2021 UpToDate, Inc. and its affiliates and/or licensors. All rights reserved.
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
## 2025-07-10 ENCOUNTER — OFFICE VISIT (OUTPATIENT)
Dept: GYNECOLOGIC ONCOLOGY | Facility: CLINIC | Age: 75
End: 2025-07-10
Payer: MEDICARE

## 2025-07-10 VITALS
DIASTOLIC BLOOD PRESSURE: 59 MMHG | SYSTOLIC BLOOD PRESSURE: 118 MMHG | WEIGHT: 190.69 LBS | BODY MASS INDEX: 32.73 KG/M2 | HEART RATE: 50 BPM

## 2025-07-10 DIAGNOSIS — Z85.42 HISTORY OF ENDOMETRIAL CANCER: Primary | ICD-10-CM

## 2025-07-10 PROCEDURE — 99999 PR PBB SHADOW E&M-EST. PATIENT-LVL III: CPT | Mod: PBBFAC,HCNC,, | Performed by: STUDENT IN AN ORGANIZED HEALTH CARE EDUCATION/TRAINING PROGRAM

## 2025-07-25 DIAGNOSIS — G47.33 OSA (OBSTRUCTIVE SLEEP APNEA): ICD-10-CM

## 2025-07-25 DIAGNOSIS — E66.811 CLASS 1 OBESITY DUE TO EXCESS CALORIES WITH SERIOUS COMORBIDITY AND BODY MASS INDEX (BMI) OF 30.0 TO 30.9 IN ADULT: ICD-10-CM

## 2025-07-25 DIAGNOSIS — E66.09 CLASS 1 OBESITY DUE TO EXCESS CALORIES WITH SERIOUS COMORBIDITY AND BODY MASS INDEX (BMI) OF 30.0 TO 30.9 IN ADULT: ICD-10-CM

## 2025-07-25 RX ORDER — TIRZEPATIDE 2.5 MG/.5ML
2.5 INJECTION, SOLUTION SUBCUTANEOUS
Qty: 2 ML | Refills: 1 | Status: SHIPPED | OUTPATIENT
Start: 2025-07-25 | End: 2025-09-19

## 2025-08-20 ENCOUNTER — TELEPHONE (OUTPATIENT)
Dept: FAMILY MEDICINE | Facility: CLINIC | Age: 75
End: 2025-08-20
Payer: MEDICARE

## 2025-08-20 DIAGNOSIS — E66.09 CLASS 1 OBESITY DUE TO EXCESS CALORIES WITH SERIOUS COMORBIDITY AND BODY MASS INDEX (BMI) OF 30.0 TO 30.9 IN ADULT: ICD-10-CM

## 2025-08-20 DIAGNOSIS — G47.33 OSA (OBSTRUCTIVE SLEEP APNEA): ICD-10-CM

## 2025-08-20 DIAGNOSIS — E66.811 CLASS 1 OBESITY DUE TO EXCESS CALORIES WITH SERIOUS COMORBIDITY AND BODY MASS INDEX (BMI) OF 30.0 TO 30.9 IN ADULT: ICD-10-CM

## 2025-08-22 RX ORDER — TIRZEPATIDE 5 MG/.5ML
5 INJECTION, SOLUTION SUBCUTANEOUS
Qty: 2 ML | Refills: 0 | Status: SHIPPED | OUTPATIENT
Start: 2025-09-20 | End: 2025-10-18

## (undated) DEVICE — SOL IRRI STRL WATER 1000ML

## (undated) DEVICE — IRRIGATOR ENDOSCOPY DISP.

## (undated) DEVICE — GLOVE SENSICARE PI GRN 7

## (undated) DEVICE — GLOVE SENSICARE PI SURG 7.5

## (undated) DEVICE — COVER TIP CURVED SCISSORS XI

## (undated) DEVICE — DRAPE COLUMN DAVINCI XI

## (undated) DEVICE — GLOVE SENSICARE PI GRN 8

## (undated) DEVICE — GOWN ECLIPSE REINF LV4 XLNG XL

## (undated) DEVICE — PORT ACCESS 5MM W/120MM

## (undated) DEVICE — INSERT CUSHIONPRONE VIEW LARGE

## (undated) DEVICE — SUT MCRYL PLUS 4-0 PS2 27IN

## (undated) DEVICE — OBTURATOR BLADELESS 8MM XI CLR

## (undated) DEVICE — SET TRI-LUMEN FILTERED TUBE

## (undated) DEVICE — SOL NORMAL USPCA 0.9%

## (undated) DEVICE — GLOVE SENSICARE PI SURG 6

## (undated) DEVICE — APPLICATOR ARISTA FLEX XL

## (undated) DEVICE — SUT V-LOC 90 GS22 2-0 VIO 23CM

## (undated) DEVICE — GLOVE SENSICARE PI GRN 6.5

## (undated) DEVICE — SOL POVIDONE PREP IODINE 4 OZ

## (undated) DEVICE — TRAY DO THE ROBOT

## (undated) DEVICE — SOL ELECTROLUBE ANTI-STIC

## (undated) DEVICE — ELECTRODE REM PLYHSV RETURN 9

## (undated) DEVICE — POWDER ARISTA AH 3G

## (undated) DEVICE — SOL POVIDONE SCRUB IODINE 4 OZ

## (undated) DEVICE — SYR 10CC LUER LOCK

## (undated) DEVICE — KIT WING PAD POSITIONING

## (undated) DEVICE — SEAL UNIVERSAL 5MM-8MM XI

## (undated) DEVICE — DRAPE ARM DAVINCI XI

## (undated) DEVICE — NDL INSUFFLATION VERRES 120MM

## (undated) DEVICE — JELLY SURGILUBE 5GR

## (undated) DEVICE — GLOVE SENSICARE PI SURG 6.5

## (undated) DEVICE — ADHESIVE DERMABOND ADVANCED

## (undated) DEVICE — HEMOSTAT SURGICEL NU-KNIT 6X9

## (undated) DEVICE — Device

## (undated) DEVICE — TOWEL OR DISP STRL BLUE 4/PK

## (undated) DEVICE — GRASPER EPIX 5X20MM 45CM

## (undated) DEVICE — MANIPULATOR VCARE PLUS 34MM

## (undated) DEVICE — DEVICE ANC SW STAT FOLEY 6-24

## (undated) DEVICE — SYS SEE SHARP SCP ANTIFG LNG